# Patient Record
Sex: FEMALE | Race: BLACK OR AFRICAN AMERICAN | Employment: UNEMPLOYED | URBAN - METROPOLITAN AREA
[De-identification: names, ages, dates, MRNs, and addresses within clinical notes are randomized per-mention and may not be internally consistent; named-entity substitution may affect disease eponyms.]

---

## 2017-04-15 ENCOUNTER — HOSPITAL ENCOUNTER (EMERGENCY)
Age: 27
Discharge: HOME | End: 2017-04-15
Attending: EMERGENCY MEDICINE
Payer: SELF-PAY

## 2017-04-15 ENCOUNTER — APPOINTMENT (OUTPATIENT)
Dept: GENERAL RADIOLOGY | Age: 27
End: 2017-04-15
Attending: PHYSICIAN ASSISTANT
Payer: SELF-PAY

## 2017-04-15 VITALS
TEMPERATURE: 97.3 F | OXYGEN SATURATION: 98 % | SYSTOLIC BLOOD PRESSURE: 119 MMHG | DIASTOLIC BLOOD PRESSURE: 54 MMHG | RESPIRATION RATE: 18 BRPM | HEART RATE: 126 BPM

## 2017-04-15 DIAGNOSIS — Z71.6 TOBACCO ABUSE COUNSELING: ICD-10-CM

## 2017-04-15 DIAGNOSIS — J45.901 ASTHMA WITH ACUTE EXACERBATION, UNSPECIFIED ASTHMA SEVERITY: Primary | ICD-10-CM

## 2017-04-15 DIAGNOSIS — F14.90 COCAINE USE: ICD-10-CM

## 2017-04-15 LAB
AMPHET UR QL SCN: NEGATIVE
APPEARANCE UR: ABNORMAL
BACTERIA URNS QL MICRO: ABNORMAL /HPF
BARBITURATES UR QL SCN: NEGATIVE
BENZODIAZ UR QL: NEGATIVE
BILIRUB UR QL: NEGATIVE
CANNABINOIDS UR QL SCN: POSITIVE
COCAINE UR QL SCN: POSITIVE
COLOR UR: ABNORMAL
DRUG SCRN COMMENT,DRGCM: ABNORMAL
EPITH CASTS URNS QL MICRO: ABNORMAL /LPF
GLUCOSE UR STRIP.AUTO-MCNC: NEGATIVE MG/DL
HCG UR QL: NEGATIVE
HGB UR QL STRIP: NEGATIVE
KETONES UR QL STRIP.AUTO: NEGATIVE MG/DL
LEUKOCYTE ESTERASE UR QL STRIP.AUTO: ABNORMAL
METHADONE UR QL: NEGATIVE
NITRITE UR QL STRIP.AUTO: NEGATIVE
OPIATES UR QL: POSITIVE
PCP UR QL: NEGATIVE
PH UR STRIP: 6.5 [PH] (ref 5–8)
PROT UR STRIP-MCNC: NEGATIVE MG/DL
RBC #/AREA URNS HPF: ABNORMAL /HPF (ref 0–5)
SP GR UR REFRACTOMETRY: 1.02 (ref 1–1.03)
UA: UC IF INDICATED,UAUC: ABNORMAL
UROBILINOGEN UR QL STRIP.AUTO: 1 EU/DL (ref 0.2–1)
WBC URNS QL MICRO: ABNORMAL /HPF (ref 0–4)

## 2017-04-15 PROCEDURE — 81001 URINALYSIS AUTO W/SCOPE: CPT | Performed by: PHYSICIAN ASSISTANT

## 2017-04-15 PROCEDURE — 80307 DRUG TEST PRSMV CHEM ANLYZR: CPT | Performed by: PHYSICIAN ASSISTANT

## 2017-04-15 PROCEDURE — 99284 EMERGENCY DEPT VISIT MOD MDM: CPT

## 2017-04-15 PROCEDURE — 94640 AIRWAY INHALATION TREATMENT: CPT

## 2017-04-15 PROCEDURE — 74011250637 HC RX REV CODE- 250/637: Performed by: PHYSICIAN ASSISTANT

## 2017-04-15 PROCEDURE — 87147 CULTURE TYPE IMMUNOLOGIC: CPT | Performed by: PHYSICIAN ASSISTANT

## 2017-04-15 PROCEDURE — 77030013140 HC MSK NEB VYRM -A

## 2017-04-15 PROCEDURE — 93005 ELECTROCARDIOGRAM TRACING: CPT

## 2017-04-15 PROCEDURE — 71020 XR CHEST PA LAT: CPT

## 2017-04-15 PROCEDURE — 81025 URINE PREGNANCY TEST: CPT

## 2017-04-15 PROCEDURE — 87086 URINE CULTURE/COLONY COUNT: CPT | Performed by: PHYSICIAN ASSISTANT

## 2017-04-15 PROCEDURE — 74011000250 HC RX REV CODE- 250: Performed by: PHYSICIAN ASSISTANT

## 2017-04-15 RX ORDER — IPRATROPIUM BROMIDE AND ALBUTEROL SULFATE 2.5; .5 MG/3ML; MG/3ML
3 SOLUTION RESPIRATORY (INHALATION)
Status: COMPLETED | OUTPATIENT
Start: 2017-04-15 | End: 2017-04-15

## 2017-04-15 RX ORDER — ALBUTEROL SULFATE 90 UG/1
2 AEROSOL, METERED RESPIRATORY (INHALATION)
Qty: 1 INHALER | Refills: 0 | Status: SHIPPED | OUTPATIENT
Start: 2017-04-15 | End: 2018-07-26

## 2017-04-15 RX ORDER — METHYLPREDNISOLONE 4 MG/1
TABLET ORAL
Qty: 1 DOSE PACK | Refills: 0 | Status: ON HOLD | OUTPATIENT
Start: 2017-04-15 | End: 2017-10-27

## 2017-04-15 RX ORDER — ALBUTEROL SULFATE 1.25 MG/3ML
2.5 SOLUTION RESPIRATORY (INHALATION)
Qty: 3 ML | Refills: 0 | Status: ON HOLD | OUTPATIENT
Start: 2017-04-15 | End: 2017-10-27

## 2017-04-15 RX ORDER — DIAZEPAM 2 MG/1
2 TABLET ORAL
Status: COMPLETED | OUTPATIENT
Start: 2017-04-15 | End: 2017-04-15

## 2017-04-15 RX ADMIN — IPRATROPIUM BROMIDE AND ALBUTEROL SULFATE 3 ML: .5; 3 SOLUTION RESPIRATORY (INHALATION) at 17:50

## 2017-04-15 RX ADMIN — IPRATROPIUM BROMIDE AND ALBUTEROL SULFATE 3 ML: .5; 3 SOLUTION RESPIRATORY (INHALATION) at 17:17

## 2017-04-15 RX ADMIN — DIAZEPAM 2 MG: 2 TABLET ORAL at 18:14

## 2017-04-15 NOTE — LETTER
[address] 
[facility] EMERGENCY DEPT 
[address] [place] [address] [address] [id number] 
[phone] Work/School Note Date: 4/15/2017 To Whom It May concern: 
 
Cass Anne was seen and treated today in the emergency room by the following provider(s): 
Attending Provider: Fawad Rubin MD 
Physician Assistant: Elio Gonzalez, Rebecca Lopze. Cass Anne may return to work on 4/17/2017. Sincerely, 
 
 
 
 
Rebecca Moore

## 2017-04-15 NOTE — ED PROVIDER NOTES
HPI Comments: Jessa Woo is a 23 y.o. female with PMHx significant for asthma, who presents ambulatory to the ED with cc of sudden onset SOB x 30 minutes. She also c/o a productive cough x \"few weeks\" and intermittent subjective fevers x months. She anxious and crying throughout interview. Pt reports that she was walking in [facility] when she suddenly became SOB. She states that she took \"3 BCs\" PTA and reports that she has not used her inhaler \"in a while\". Pt notes that her most recent asthma flare up was \"months ago\" but is unsure whether her current SOB is c/w that. Of note, pt admits to smoking 1 ppd since she was 17years old. She denies chance of pregnancy, CP, abd pain, N/V/D, hemoptysis, palpitations. PCP: PROVIDER UNKNOWN    Social hx: smoking (1 ppd) EtOH (+)    There are no other complaints, changes, or physical findings at this time. The history is provided by the patient. Past Medical History:   Diagnosis Date    ADHD (attention deficit hyperactivity disorder)     Asthma        History reviewed. No pertinent surgical history. Family History:   Problem Relation Age of Onset    Asthma Mother     Hypertension Mother     Asthma Sister     Hypertension Sister     Asthma Brother     Hypertension Brother        Social History     Social History    Marital status: SINGLE     Spouse name: N/A    Number of children: N/A    Years of education: N/A     Occupational History    Not on file. Social History Main Topics    Smoking status: Current Every Day Smoker     Packs/day: 1.00    Smokeless tobacco: Never Used    Alcohol use Yes    Drug use: Yes     Special: Marijuana    Sexual activity: Yes     Partners: Female     Other Topics Concern    Not on file     Social History Narrative    ** Merged History Encounter **         ** Merged History Encounter **              ALLERGIES: Review of patient's allergies indicates no known allergies.     Review of Systems Constitutional: Positive for fever. Negative for activity change, appetite change, chills, diaphoresis and unexpected weight change. HENT: Negative for congestion, hearing loss, rhinorrhea, sinus pressure, sneezing, sore throat and trouble swallowing. Eyes: Negative for pain, redness, itching and visual disturbance. Respiratory: Positive for cough and shortness of breath. Negative for wheezing. Cardiovascular: Negative for chest pain, palpitations and leg swelling. Gastrointestinal: Negative for abdominal pain, constipation, diarrhea, nausea and vomiting. Genitourinary: Negative for dysuria. Musculoskeletal: Negative for arthralgias, gait problem and myalgias. Skin: Negative for color change, pallor, rash and wound. Neurological: Negative for tremors, weakness, light-headedness, numbness and headaches. Psychiatric/Behavioral: The patient is nervous/anxious. All other systems reviewed and are negative. Vitals:    04/15/17 1655 04/15/17 1750 04/15/17 1800 04/15/17 1900   BP: (!) 147/98 117/59 119/54    Pulse: 69 94 88 (!) 126   Resp: 18      Temp: 97.3 °F (36.3 °C)      SpO2: 100% 100% 100% 98%            Physical Exam   Constitutional: She is oriented to person, place, and time. She appears well-developed and well-nourished. No distress. Tearful, anxious appearing. HENT:   Head: Normocephalic and atraumatic. Right Ear: External ear normal.   Left Ear: External ear normal.   Mouth/Throat: Oropharynx is clear and moist. No oropharyngeal exudate. Eyes: Conjunctivae are normal. Pupils are equal, round, and reactive to light. Right eye exhibits no discharge. Left eye exhibits no discharge. No scleral icterus. Appear dilated   Neck: Normal range of motion. Neck supple. No tracheal deviation present. Cardiovascular: Normal rate, regular rhythm, normal heart sounds and intact distal pulses. Exam reveals no gallop and no friction rub. No murmur heard.   Pulmonary/Chest: No stridor. No respiratory distress. She has wheezes (lung apices). She has no rales. She exhibits no tenderness. Comfortable at rest without tachypnea, accessory muscle use, or obvious chest wall abnormalities. Communicates in full sentences  Diffuse expiratory wheezing  Pt maintaining airway   Abdominal: Soft. Bowel sounds are normal. She exhibits no distension. There is no tenderness. Musculoskeletal: Normal range of motion. She exhibits no edema, tenderness or deformity. Lymphadenopathy:     She has no cervical adenopathy. Neurological: She is alert and oriented to person, place, and time. No cranial nerve deficit. Coordination normal.   Skin: Skin is warm and dry. No rash noted. She is not diaphoretic. No erythema. No pallor. Psychiatric:   Pt responds appropriately to questions but appears paranoid and anxious during interview     Nursing note and vitals reviewed. MDM  Number of Diagnoses or Management Options  Asthma with acute exacerbation, unspecified asthma severity:   Cocaine use: Tobacco abuse counseling:   Diagnosis management comments: DDx: asthma,exacerbation,PNA, brochitis, illicit drug use, anxiety       Amount and/or Complexity of Data Reviewed  Clinical lab tests: reviewed and ordered  Tests in the radiology section of CPT®: reviewed and ordered  Tests in the medicine section of CPT®: reviewed and ordered  Review and summarize past medical records: yes  Independent visualization of images, tracings, or specimens: yes    Patient Progress  Patient progress: stable    ED Course       Procedures  I reviewed our electronic medical record system for any past medical records that were available that may contribute to the patients current condition, the nursing notes and and vital signs from today's visit     Nursing notes will be reviewed as they become available in realtime while the pt is in the ED.     Progress Note:  4: 48 PM  The patients presenting problems have been discussed, and they are in agreement with the care plan formulated and outlined with them. I have encouraged them to ask questions as they arise throughout their visit. Progress Note:  5:30 PM  Pt states \"Oh I feel better now. \" Pt's UDS positive for cocaine and opioids. She states that she took \"Bianca\" yesterday at 0200 but was unaware that it had cocaine in it, stating \" I don't do cocaine. \" Informed pt that the person that she bought her MDMA from likely cut her MDMA with cocaine. Pt denies THC use. Awaiting XR results. Written by Maria Alejandra Lin, ED Scribe as dictated by [employer], RANJEET    6:55 PM  Provider re-evaluated pt. Per patient, SOB has improved. Provider discussed all available diagnostics, diagnosis, and treatment plan. Thoroughly discussed worrisome signs/symptoms in which pt should immediately return to ED, otherwise, urged to follow-up with PCP. Patient conveys understanding and agreement to all of the above. All patient's questions were answered by provider. DISCHARGE NOTE:  6:58 PM  Yaz Galarza's  results have been reviewed with her. She has been counseled regarding her diagnosis. She verbally conveys understanding and agreement of the signs, symptoms, diagnosis, treatment and prognosis and additionally agrees to follow up as recommended with Dr. Calli Westbrook in 24 - 48 hours. She also agrees with the care-plan and conveys that all of her questions have been answered. I have also put together some discharge instructions for her that include: 1) educational information regarding their diagnosis, 2) how to care for their diagnosis at home, as well a 3) list of reasons why they would want to return to the ED prior to their follow-up appointment, should their condition change. She and/or family's questions have been answered. I have encouraged them to see the official results in [facility]\" or to retrieve the specifics of their results from medical records.      LABS COMPLETED AND REVIEWED:  Recent Results (from the past 12 hour(s))   EKG, 12 LEAD, INITIAL    Collection Time: 04/15/17  5:00 PM   Result Value Ref Range    Ventricular Rate 70 BPM    Atrial Rate 70 BPM    P-R Interval 126 ms    QRS Duration 88 ms    Q-T Interval 388 ms    QTC Calculation (Bezet) 419 ms    Calculated P Axis 2 degrees    Calculated R Axis 79 degrees    Calculated T Axis 52 degrees    Diagnosis       Normal sinus rhythm  Normal ECG  When compared with ECG of 26-AUG-2016 21:10,  No significant change was found     URINALYSIS W/ REFLEX CULTURE    Collection Time: 04/15/17  5:23 PM   Result Value Ref Range    Color YELLOW/STRAW      Appearance CLOUDY (A) CLEAR      Specific gravity 1.018 1.003 - 1.030      pH (UA) 6.5 5.0 - 8.0      Protein NEGATIVE  NEG mg/dL    Glucose NEGATIVE  NEG mg/dL    Ketone NEGATIVE  NEG mg/dL    Bilirubin NEGATIVE  NEG      Blood NEGATIVE  NEG      Urobilinogen 1.0 0.2 - 1.0 EU/dL    Nitrites NEGATIVE  NEG      Leukocyte Esterase SMALL (A) NEG      WBC 0-4 0 - 4 /hpf    RBC 0-5 0 - 5 /hpf    Epithelial cells MODERATE (A) FEW /lpf    Bacteria 1+ (A) NEG /hpf    UA:UC IF INDICATED URINE CULTURE ORDERED (A) CNI     DRUG SCREEN, URINE    Collection Time: 04/15/17  5:23 PM   Result Value Ref Range    AMPHETAMINE NEGATIVE  NEG      BARBITURATES NEGATIVE  NEG      BENZODIAZEPINE NEGATIVE  NEG      COCAINE POSITIVE (A) NEG      METHADONE NEGATIVE  NEG      OPIATES POSITIVE (A) NEG      PCP(PHENCYCLIDINE) NEGATIVE  NEG      THC (TH-CANNABINOL) POSITIVE (A) NEG      Drug screen comment (NOTE)    HCG URINE, QL. - POC    Collection Time: 04/15/17  5:25 PM   Result Value Ref Range    Pregnancy test,urine (POC) NEGATIVE  NEG         IMAGING COMPLETED AND REVIEWED:  EXAM: XR CHEST PA LAT     INDICATION: SOB, Hx of asthma     COMPARISON: 2016.     FINDINGS: A single view of the chest demonstrates clear lungs.  The cardiac and  mediastinal contours and pulmonary vascularity are normal. The bones and soft  tissues are within normal limits.      IMPRESSION  IMPRESSION: No acute abnormality identified.       CLINICAL IMPRESSION:  1. Asthma with acute exacerbation, unspecified asthma severity    2. Cocaine use    3. Tobacco abuse counseling        Plan:  1. Return precautions  2. Medications as prescribed  3. Follow-ups as discussed  4. Discontinue illicit drug use    Discharge Medication List as of 4/15/2017  6:59 PM      START taking these medications    Details   albuterol (PROAIR HFA) 90 mcg/actuation inhaler Take 2 Puffs by inhalation every six (6) hours as needed for Wheezing., Print, Disp-1 Inhaler, R-0      albuterol (ACCUNEB) 1.25 mg/3 mL nebu 6 mL by Nebulization route every six (6) hours as needed. , Print, Disp-3 mL, R-0      methylPREDNISolone (MEDROL, SVETA,) 4 mg tablet Per dose pack instructions, Print, Disp-1 Dose Pack, R-0           Follow-up Information     Follow up With Details Comments Contact Info    Provider Unknown Schedule an appointment as soon as possible for a visit in 2 days As needed, If symptoms worsen, Possible further evaluation and treatment Patient not available to ask      [facility] EMERGENCY DEPT Go to As needed, If symptoms worsen [address] [facility] [address]  [phone]    Harshal Corcoran MD Schedule an appointment as soon as possible for a visit in 2 days As needed, If symptoms worsen, Possible further evaluation and treatment [address] [facility]  Francis Conklin [address] [place] [address]  [phone]          Return to the closest emergency room or follow up sooner for any deterioration        This note will not be viewable in [employer]. This note is prepared by Felipe Vincent, acting as Scribe for Jonathan Corbin PA-C. Jonathan Corbin PA-C: The scribe's documentation has been prepared under my direction and personally reviewed by me in its entirety.  I confirm that the note above accurately reflects all work, treatment, procedures, and medical decision making performed by me.

## 2017-04-15 NOTE — ED NOTES
Discharge instructions provided to patient. Patient verbalizes understanding. Patient ambulatory out of ED with transportation home.

## 2017-04-15 NOTE — ED NOTES
Assumed care of patient from triage. Patient tearful and shivering, asking \"Am I going to be all right? \" Patient claims she was at [facility] and suddenly developed a headache and shortness of breath. Vital signs stable. Monitor x3.

## 2017-04-15 NOTE — DISCHARGE INSTRUCTIONS
Asthma Attack: Care Instructions  Your Care Instructions    During an asthma attack, the airways swell and narrow. This makes it hard to breathe. Severe asthma attacks can be life-threatening, but you can help prevent them by keeping your asthma under control and treating symptoms before they get bad. Symptoms include being short of breath, having chest tightness, coughing, and wheezing. Noting and treating these symptoms can also help you avoid future trips to the emergency room. The doctor has checked you carefully, but problems can develop later. If you notice any problems or new symptoms, get medical treatment right away. Follow-up care is a key part of your treatment and safety. Be sure to make and go to all appointments, and call your doctor if you are having problems. It's also a good idea to know your test results and keep a list of the medicines you take. How can you care for yourself at home? · Follow your asthma action plan to prevent and treat attacks. If you don't have an asthma action plan, work with your doctor to create one. · Take your asthma medicines exactly as prescribed. Talk to your doctor right away if you have any questions about how to take them. ¨ Use your quick-relief medicine when you have symptoms of an attack. Quick-relief medicine is usually an albuterol inhaler. Some people need to use quick-relief medicine before they exercise. ¨ Take your controller medicine every day, not just when you have symptoms. Controller medicine is usually an inhaled corticosteroid. The goal is to prevent problems before they occur. Don't use your controller medicine to treat an attack that has already started. It doesn't work fast enough to help. ¨ If your doctor prescribed corticosteroid pills to use during an attack, take them exactly as prescribed. It may take hours for the pills to work, but they may make the episode shorter and help you breathe better.   ¨ Keep your quick-relief medicine with you at all times. · Talk to your doctor before using other medicines. Some medicines, such as aspirin, can cause asthma attacks in some people. · If you have a peak flow meter, use it to check how well you are breathing. This can help you predict when an asthma attack is going to occur. Then you can take medicine to prevent the asthma attack or make it less severe. · Do not smoke or allow others to smoke around you. Avoid smoky places. Smoking makes asthma worse. If you need help quitting, talk to your doctor about stop-smoking programs and medicines. These can increase your chances of quitting for good. · Learn what triggers an asthma attack for you, and avoid the triggers when you can. Common triggers include colds, smoke, air pollution, dust, pollen, mold, pets, cockroaches, stress, and cold air. · Avoid colds and the flu. Get a pneumococcal vaccine shot. If you have had one before, ask your doctor if you need a second dose. Get a flu vaccine every fall. If you must be around people with colds or the flu, wash your hands often. When should you call for help? Call 911 anytime you think you may need emergency care. For example, call if:  · You have severe trouble breathing. Call your doctor now or seek immediate medical care if:  · Your symptoms do not get better after you have followed your asthma action plan. · You have new or worse trouble breathing. · Your coughing and wheezing get worse. · You cough up dark brown or bloody mucus (sputum). · You have a new or higher fever. Watch closely for changes in your health, and be sure to contact your doctor if:  · You need to use quick-relief medicine on more than 2 days a week (unless it is just for exercise). · You cough more deeply or more often, especially if you notice more mucus or a change in the color of your mucus. · You are not getting better as expected. Where can you learn more?   Go to http://eddie-leonardo.info/. Enter R995 in the search box to learn more about \"Asthma Attack: Care Instructions. \"  Current as of: May 23, 2016  Content Version: 11.2  © 7631-9422 [employer]. Care instructions adapted under license by [employer] (which disclaims liability or warranty for this information). If you have questions about a medical condition or this instruction, always ask your healthcare professional. [place] [address] any warranty or liability for your use of this information. Learning About Asthma Triggers  What are triggers? When you have asthma, certain things can make your symptoms worse. These are called triggers. They include:  · Cigarette smoke or air pollution. · Things you are allergic to, such as:  ¨ Pollen, mold, or dust mites. ¨ Pet hair, skin, or saliva. · Illnesses, like colds, flu, or pneumonia. · Exercise. · Dry, cold air. How do triggers affect asthma? Triggers can make it harder for your lungs to work as they should and can lead to sudden difficulty breathing and other symptoms. When you are around a trigger, an asthma attack is more likely. If your symptoms are severe, you may need emergency treatment or have to go to the hospital for treatment. If you know what your triggers are and can avoid them, you may be able to prevent asthma attacks, reduce how often you have them, and make them less severe. What can you do to avoid triggers? The first thing is to know your triggers. When you are having symptoms, note the things around you that might be causing them. Then look for patterns in what may be triggering your symptoms. Record your triggers on a piece of paper or in an asthma diary. When you have your list of possible triggers, work with your doctor to find ways to avoid them. You also can check how well your lungs are working by measuring your peak expiratory flow (PEF) throughout the day.  Your PEF may drop when you are near things that trigger symptoms. Here are some ways to avoid a few common triggers. · Do not smoke or allow others to smoke around you. If you need help quitting, talk to your doctor about stop-smoking programs and medicines. These can increase your chances of quitting for good. · If there is a lot of pollution, pollen, or dust outside, stay at home and keep your windows closed. Use an air conditioner or air filter in your home. Check your local weather report or newspaper for air quality and pollen reports. · Get a flu shot every year. Talk to your doctor about getting a pneumococcal shot. Wash your hands often to prevent infections. · Avoid exercising outdoors in cold weather. If you are outdoors in cold weather, wear a scarf around your face and breathe through your nose. How can you manage an asthma attack? · If you have an asthma action plan, follow the plan. In general:  ¨ Use your quick-relief inhaler as directed by your doctor. If your symptoms do not get better after you use your medicine, have someone take you to the emergency room. Call an ambulance if needed. ¨ If your doctor has given you other inhaled medicines or steroid pills, take them as directed. Where can you learn more? Go to [employer].be  Enter M564 in the search box to learn more about \"Learning About Asthma Triggers. \"   © 3195-3757 Healthwise, Incorporated. Care instructions adapted under license by [employer] (which disclaims liability or warranty for this information). This care instruction is for use with your licensed healthcare professional. If you have questions about a medical condition or this instruction, always ask your healthcare professional. [place] [address] any warranty or liability for your use of this information. Content Version: 55.1.111721;  Last Revised: February 23, 2012                Asthma Action Plan: After Your Visit  Your Care Instructions  An asthma action plan is based on peak flow and asthma symptoms. Sorting symptoms and peak flow into red, yellow, and green \"zones\" can help you know how bad your asthma is and what actions you should take. Work with your doctor to make your plan. An action plan may include:  · The peak flow readings and symptoms for each zone. · What medicines to take in each zone. · When to call a doctor. · A list of emergency contact numbers. · A list of your asthma triggers. Follow-up care is a key part of your treatment and safety. Be sure to make and go to all appointments, and call your doctor if you are having problems. It's also a good idea to know your test results and keep a list of the medicines you take. How can you care for yourself at home? · Take your daily medicines to help minimize long-term damage and avoid asthma attacks. · Check your peak flow every morning and evening. This is the best way to know how well your lungs are working. · Check your action plan to see what zone you are in.  ¨ If you are in the green zone, keep taking your daily asthma medicines as prescribed. ¨ If you are in the yellow zone, you may be having or will soon have an asthma attack. You may not have any symptoms, but your lungs are not working as well as they should. Take the medicines listed in your action plan. If you stay in the yellow zone, your doctor may need to increase the dose or add a medicine. ¨ If you are in the red zone, follow your action plan. If your symptoms or peak flow don't improve soon, you may need to go to the emergency room or be admitted to the hospital.  · Use an asthma diary. Write down your peak flow readings in the asthma diary. If you have an attack, write down what caused it (if you know), the symptoms, and what medicine you took.   · Make sure you know how and when to call your doctor or go to the hospital.  · Take both the asthma action plan and the asthma diary--along with your peak flow meter and medicines--when you see your doctor. Tell your doctor if you are having trouble following your action plan. When should you call for help? Call 911 anytime you think you may need emergency care. For example, call if:  · You have severe trouble breathing. Call your doctor now or seek immediate medical care if:  · Your symptoms do not get better after you have followed your asthma action plan. · You cough up yellow, dark brown, or bloody mucus (sputum). Watch closely for changes in your health, and be sure to contact your doctor if:  · Your coughing and wheezing get worse. · You need to use quick-relief medicine on more than 2 days a week (unless it is just for exercise). · You need help figuring out what is triggering your asthma attacks. Where can you learn more? Go to Joosy.be  Enter B511 in the search box to learn more about \"Asthma Action Plan: After Your Visit. \"   © 1314-5457 Healthwise, Incorporated. Care instructions adapted under license by [employer] (which disclaims liability or warranty for this information). This care instruction is for use with your licensed healthcare professional. If you have questions about a medical condition or this instruction, always ask your healthcare professional. [address] any warranty or liability for your use of this information. Content Version: 05.1.328271;  Last Revised: March 9, 2012

## 2017-04-15 NOTE — ED NOTES
While requesting urine sample from patient, patient states \"Oh, I feel better now. \" Patient able to urinate and nebulizer treatment started. Patient still appears slightly anxious, but anxiety seems to have improved. Will continue to monitor.

## 2017-04-16 LAB
ATRIAL RATE: 70 BPM
CALCULATED P AXIS, ECG09: 2 DEGREES
CALCULATED R AXIS, ECG10: 79 DEGREES
CALCULATED T AXIS, ECG11: 52 DEGREES
DIAGNOSIS, 93000: NORMAL
P-R INTERVAL, ECG05: 126 MS
Q-T INTERVAL, ECG07: 388 MS
QRS DURATION, ECG06: 88 MS
QTC CALCULATION (BEZET), ECG08: 419 MS
VENTRICULAR RATE, ECG03: 70 BPM

## 2017-04-17 LAB
BACTERIA SPEC CULT: ABNORMAL
BACTERIA SPEC CULT: ABNORMAL
CC UR VC: ABNORMAL
SERVICE CMNT-IMP: ABNORMAL

## 2017-09-26 ENCOUNTER — HOSPITAL ENCOUNTER (EMERGENCY)
Age: 27
Discharge: HOME | End: 2017-09-26
Attending: EMERGENCY MEDICINE | Admitting: EMERGENCY MEDICINE
Payer: SELF-PAY

## 2017-09-26 VITALS
OXYGEN SATURATION: 98 % | SYSTOLIC BLOOD PRESSURE: 109 MMHG | RESPIRATION RATE: 20 BRPM | TEMPERATURE: 98.2 F | WEIGHT: 121 LBS | BODY MASS INDEX: 19.44 KG/M2 | DIASTOLIC BLOOD PRESSURE: 66 MMHG | HEIGHT: 66 IN | HEART RATE: 96 BPM

## 2017-09-26 DIAGNOSIS — F11.93 NARCOTIC WITHDRAWAL (HCC): Primary | ICD-10-CM

## 2017-09-26 LAB
ALBUMIN SERPL-MCNC: 3.6 G/DL (ref 3.5–5)
ALBUMIN/GLOB SERPL: 0.9 {RATIO} (ref 1.1–2.2)
ALP SERPL-CCNC: 51 U/L (ref 45–117)
ALT SERPL-CCNC: 22 U/L (ref 12–78)
ANION GAP SERPL CALC-SCNC: 6 MMOL/L (ref 5–15)
AST SERPL-CCNC: 19 U/L (ref 15–37)
BASOPHILS # BLD: 0 K/UL (ref 0–0.1)
BASOPHILS NFR BLD: 0 % (ref 0–1)
BILIRUB SERPL-MCNC: 0.4 MG/DL (ref 0.2–1)
BUN SERPL-MCNC: 9 MG/DL (ref 6–20)
BUN/CREAT SERPL: 8 (ref 12–20)
CALCIUM SERPL-MCNC: 8.8 MG/DL (ref 8.5–10.1)
CHLORIDE SERPL-SCNC: 108 MMOL/L (ref 97–108)
CO2 SERPL-SCNC: 25 MMOL/L (ref 21–32)
CREAT SERPL-MCNC: 1.17 MG/DL (ref 0.55–1.02)
EOSINOPHIL # BLD: 0 K/UL (ref 0–0.4)
EOSINOPHIL NFR BLD: 0 % (ref 0–7)
ERYTHROCYTE [DISTWIDTH] IN BLOOD BY AUTOMATED COUNT: 13.8 % (ref 11.5–14.5)
GLOBULIN SER CALC-MCNC: 4.1 G/DL (ref 2–4)
GLUCOSE SERPL-MCNC: 110 MG/DL (ref 65–100)
HCT VFR BLD AUTO: 41.5 % (ref 35–47)
HGB BLD-MCNC: 13.8 G/DL (ref 11.5–16)
LYMPHOCYTES # BLD: 3.2 K/UL (ref 0.8–3.5)
LYMPHOCYTES NFR BLD: 30 % (ref 12–49)
MCH RBC QN AUTO: 28.9 PG (ref 26–34)
MCHC RBC AUTO-ENTMCNC: 33.3 G/DL (ref 30–36.5)
MCV RBC AUTO: 87 FL (ref 80–99)
MONOCYTES # BLD: 0.4 K/UL (ref 0–1)
MONOCYTES NFR BLD: 4 % (ref 5–13)
NEUTS SEG # BLD: 7.2 K/UL (ref 1.8–8)
NEUTS SEG NFR BLD: 66 % (ref 32–75)
PLATELET # BLD AUTO: 375 K/UL (ref 150–400)
POTASSIUM SERPL-SCNC: 4 MMOL/L (ref 3.5–5.1)
PROT SERPL-MCNC: 7.7 G/DL (ref 6.4–8.2)
RBC # BLD AUTO: 4.77 M/UL (ref 3.8–5.2)
SODIUM SERPL-SCNC: 139 MMOL/L (ref 136–145)
WBC # BLD AUTO: 10.9 K/UL (ref 3.6–11)

## 2017-09-26 PROCEDURE — 90791 PSYCH DIAGNOSTIC EVALUATION: CPT

## 2017-09-26 PROCEDURE — 80053 COMPREHEN METABOLIC PANEL: CPT | Performed by: EMERGENCY MEDICINE

## 2017-09-26 PROCEDURE — 99283 EMERGENCY DEPT VISIT LOW MDM: CPT

## 2017-09-26 PROCEDURE — 74011250636 HC RX REV CODE- 250/636: Performed by: EMERGENCY MEDICINE

## 2017-09-26 PROCEDURE — 85025 COMPLETE CBC W/AUTO DIFF WBC: CPT | Performed by: EMERGENCY MEDICINE

## 2017-09-26 PROCEDURE — 96374 THER/PROPH/DIAG INJ IV PUSH: CPT

## 2017-09-26 PROCEDURE — 36415 COLL VENOUS BLD VENIPUNCTURE: CPT | Performed by: EMERGENCY MEDICINE

## 2017-09-26 PROCEDURE — 96361 HYDRATE IV INFUSION ADD-ON: CPT

## 2017-09-26 RX ORDER — KETOROLAC TROMETHAMINE 30 MG/ML
30 INJECTION, SOLUTION INTRAMUSCULAR; INTRAVENOUS
Status: COMPLETED | OUTPATIENT
Start: 2017-09-26 | End: 2017-09-26

## 2017-09-26 RX ORDER — KETOROLAC TROMETHAMINE 10 MG/1
10 TABLET, FILM COATED ORAL
Qty: 12 TAB | Refills: 0 | Status: ON HOLD | OUTPATIENT
Start: 2017-09-26 | End: 2017-10-27

## 2017-09-26 RX ADMIN — SODIUM CHLORIDE 1000 ML: 900 INJECTION, SOLUTION INTRAVENOUS at 19:08

## 2017-09-26 RX ADMIN — KETOROLAC TROMETHAMINE 30 MG: 30 INJECTION, SOLUTION INTRAMUSCULAR at 19:08

## 2017-09-26 NOTE — ED PROVIDER NOTES
HPI Comments: 23 y.o. female with past medical history significant for ADHD and asthma who presents from home with chief complaint of drug addiction. Patient admits she has been using heroin daily over the past month with the last usage yesterday morning. Patient comes in today with complaints of moderate generalized body aches suspected from withdrawal sx. Patient also complains of constipation. Patient mentions she sometimes has suicidal ideation with plans to overdose, which she has attempted in the past. Patient also states she has \"hallucinations\" of a spirit in her apartment. Patient denies any other hx of drug use besides occasional marijuana. Patient denies any other sx including nausea, vomiting light-headedness, and dizziness. There are no other acute medical concerns at this time. Old Chart Review: Per note, patient was evaluated on 04/15/17 for asthma with acute exacerbation. Social hx: Tobacco Use: Yes (1 pack per day), Alcohol Use: Yes    Note written by Gerald Wright, as dictated by Papito Mcclellan MD 6:42 PM      The history is provided by the patient. Past Medical History:   Diagnosis Date    ADHD (attention deficit hyperactivity disorder)     Asthma        History reviewed. No pertinent surgical history. Family History:   Problem Relation Age of Onset    Asthma Mother     Hypertension Mother     Asthma Sister     Hypertension Sister     Asthma Brother     Hypertension Brother        Social History     Social History    Marital status: SINGLE     Spouse name: N/A    Number of children: N/A    Years of education: N/A     Occupational History    Not on file.      Social History Main Topics    Smoking status: Current Every Day Smoker     Packs/day: 1.00    Smokeless tobacco: Never Used    Alcohol use Yes    Drug use: Yes     Special: Marijuana, Other, Heroin      Comment: Bianca    Sexual activity: Yes     Partners: Female     Other Topics Concern    Not on file     Social History Narrative    ** Merged History Encounter **         ** Merged History Encounter **              ALLERGIES: Review of patient's allergies indicates no known allergies. Review of Systems   Constitutional: Negative for chills and fever. HENT: Negative for rhinorrhea and sore throat. Respiratory: Negative for cough and shortness of breath. Cardiovascular: Negative for chest pain. Gastrointestinal: Positive for constipation. Negative for abdominal pain, diarrhea, nausea and vomiting. Genitourinary: Negative for dysuria and urgency. Musculoskeletal: Positive for myalgias. Negative for arthralgias and back pain. Skin: Negative for rash. Neurological: Negative for dizziness, weakness and light-headedness. Psychiatric/Behavioral: Positive for hallucinations. All other systems reviewed and are negative. Vitals:    09/26/17 1645   BP: 109/66   Pulse: 96   Resp: 20   Temp: 98.2 °F (36.8 °C)   SpO2: 98%   Weight: 54.9 kg (121 lb)   Height: 5' 6\" (1.676 m)            Physical Exam     Vital signs reviewed. Nursing notes reviewed.     Const:  No acute distress, well developed, well nourished  Head:  Atraumatic, normocephalic  Eyes:  PERRL, conjunctiva normal, no scleral icterus  Neck:  Supple, trachea midline  Cardiovascular:  RRR, no murmurs, no gallops, no rubs  Resp:  No resp distress, no increased work of breathing, no wheezes, no rhonchi, no rales,  Abd:  Soft, non-tender, non-distended, no rebound, no guarding, no CVA tenderness  :  Deferred  MSK:  No pedal edema, normal ROM  Neuro:  Alert and oriented x3, no cranial nerve defect  Skin:  Warm, dry, intact  Psych: normal mood and affect, behavior is normal, judgement and thought content is normal    Note written by Gerald Adkins, as dictated by Violetta Dubin, MD 6:42 PM    MDM  Number of Diagnoses or Management Options  Narcotic withdrawal [facility]):      Amount and/or Complexity of Data Reviewed  Clinical lab tests: ordered and reviewed  Tests in the radiology section of CPT®: ordered and reviewed  Review and summarize past medical records: yes    Patient Progress  Patient progress: stable    ED Course     Pt. Presents to the ER with body aches. Pt is experiencing heroin withdrawal. She is well appearing in the ER. Pt. Seen by [facility] and is cleared for discharged. She was given resources for f/u for heroin use. Pt. To return to the ER with worsening sx.       Procedures

## 2017-09-26 NOTE — BSMART NOTE
Comprehensive Assessment Form Part 1      Section I - Disposition    Axis I - Opiate Dependence   Axis II - Deferred  Axis III -   Past Medical History:   Diagnosis Date    ADHD (attention deficit hyperactivity disorder)     Asthma        Axis IV - SA, homeless  [place] V - 36      The Medical Doctor to Psychiatrist conference was not completed. The Medical Doctor is in agreement with Psychiatrist disposition because of (reason) Patient does not require inpatient psychiatric treatment. The plan is discharge. Refer to [facility], [facility], NA  The on-call Psychiatrist consulted was Dr. Darryle Pam. The admitting Psychiatrist will be Dr. James Méndez. The admitting Diagnosis is NA. The Payor source is self pay. Section II - Integrated Summary  Summary:  Patient came in requesting heroin detox. Patient advised there is no heroin detox at this facility and she verbalized SI. Patient denied a plan at this time and stated she is suicidal \"because of drugs. \"  Patient denied HI. Patient denied any current treatment but reported in the past she has been diagnosed ADHD and \"other imperative\" and treated. Patient denied SA treatment. Patient reported use of heroin in the last month and denied other substances. Patient is alert and oriented. She is irritable and stated Serjio Lanier is your assessment? \"  \"you think I'm an angry person. \"  Patient reported she lives on the streets and \"my women\"  Take care of her. Patient requesting a place to be sent tonight because she needs help and is tired of this. Patient does not appear to be imminently suicidal   The patienthas demonstrated mental capacity to provide informed consent. The information is given by the patient and parent. The Chief Complaint is heroin dependence. The Precipitant Factors are homeless, SA. Previous Hospitalizations: NA  The patient has not previously been in restraints. Current Psychiatrist and/or  is NA.     Lethality Assessment:    The potential for suicide noted by the following: ideation and current substance abuse . The potential for homicide is not noted. The patient has not been a perpetrator of sexual or physical abuse. There are not pending charges. The patient is not felt to be at risk for self harm or harm to others. The attending nurse was advised that security has not been notified. Section III - Psychosocial  The patient's overall mood and attitude is irritable. Feelings of helplessness and hopelessness are not observed. Generalized anxiety is not observed. Panic is not observed. Phobias are not observed. Obsessive compulsive tendencies are not observed. Section IV - Mental Status Exam  The patient's appearance shows no evidence of impairment. The patient's behavior shows no evidence of impairment. The patient is oriented to time, place, person and situation. The patient's speech shows no evidence of impairment. The patient's mood is irritable. The range of affect is constricted. The patient's thought content demonstrates no evidence of impairment. The thought process shows no evidence of impairment. The patient's perception shows no evidence of impairment. The patient's memory shows no evidence of impairment. The patient's appetite is decreased and shows signs of weight loss. The patient's sleep has evidence of insomnia. The patient shows no insight. The patient's judgement is psychologically impaired. Section V - Substance Abuse  The patient is using substances. The patient is using heroin by inhalation for a month with last use on yesterday. The patient has experienced the following withdrawal symptoms: body aches and abdominal pain. Section VI - Living Arrangements  The patient is single. The patient lives alone. The patient has no children. The patient does not plan to return home upon discharge. The patient does not have legal issues pending.  The patient's source of income comes from unemployment . Sikhism and cultural practices have not been voiced at this time. The patient's greatest support comes from \"my women\" and this person will not be involved with the treatment. The patient has not been in an event described as horrible or outside the realm of ordinary life experience either currently or in the past.  The patient has not been a victim of sexual/physical abuse. Section VII - Other Areas of Clinical Concern  The highest grade achieved is NA with the overall quality of school experience being described as NA. The patient is currently unemployed and speaks [place] as a primary language. The patient has no communication impairments affecting communication. The patient's preference for learning can be described as: can read and write adequately.   The patient's hearing is normal.  The patient's vision is normal.      Arminda Snellen, LPC

## 2017-09-26 NOTE — ED TRIAGE NOTES
Pt arrives from home with her mother requesting drug detox for heroin. Pt instructed that we did not have a detox program.  Pt states, \"I'm suicidal from the pain. \"

## 2017-09-27 NOTE — ED NOTES
Pt requesting IV be removed so she can leave; Notified that MD is aware of her leg pain; States 'just take this out so I can go home.  I already got my papers from the counselor'; IV removed

## 2017-09-27 NOTE — DISCHARGE INSTRUCTIONS
Opioid Withdrawal: Care Instructions  Your Care Instructions  Opioids are strong pain medicines. Examples include hydrocodone, oxycodone, fentanyl, and morphine. Heroin is an example of an illegal opioid. Your body gets used to this type of drug if you take it all the time. This is called being dependent on the drug. And when you stop taking it, you go through withdrawal.  Withdrawal symptoms can include nausea, sweating, chills, diarrhea, stomach cramps, and muscle aches. Withdrawal can last up to several weeks, depending on which drug you took. You may feel very ill, but you are probably not in medical danger. Withdrawal isn't easy, but there are things you can do to help you cope with the symptoms. You will feel a little bit better each day as your body adjusts and heals itself. Follow-up care is a key part of your treatment and safety. Be sure to make and go to all appointments, and call your doctor if you are having problems. It's also a good idea to know your test results and keep a list of the medicines you take. How can you care for yourself at home? · Your doctor may give you medicine to help you feel better. Read and follow all instructions on the label. · To help get through withdrawal, you can also:  ¨ Get plenty of rest.  ¨ Drink plenty of fluids. ¨ Stay active, but don't tire yourself. ¨ Eat a healthy diet. · Do not drink alcohol or take illegal drugs. · Talk to your doctor about drug treatment programs to help you stay drug-free. · Talk to your doctor or pharmacist about having a naloxone rescue kit on hand. When should you call for help? Call 911 anytime you think you may need emergency care. For example, call if:  · You feel you cannot stop from hurting yourself or someone else. Call your doctor now or seek immediate medical care if:  · You have new or worse withdrawal symptoms that you can't manage at home, such as:  ¨ Stomach cramps. ¨ Vomiting. ¨ Diarrhea.   ¨ Muscle aches.  ¨ Sweating. Watch closely for changes in your health, and be sure to contact your doctor if:  · You do not get better as expected. Where can you learn more? Go to http://eddie-leonardo.info/. Enter E242 in the search box to learn more about \"Opioid Withdrawal: Care Instructions. \"  Current as of: February 21, 2017  Content Version: 11.3  © 6298-8606 [employer]. Care instructions adapted under license by [employer] (which disclaims liability or warranty for this information). If you have questions about a medical condition or this instruction, always ask your healthcare professional. [address] any warranty or liability for your use of this information.

## 2017-10-26 ENCOUNTER — HOSPITAL ENCOUNTER (INPATIENT)
Age: 27
LOS: 4 days | Discharge: HOME | DRG: 897 | End: 2017-10-30
Attending: EMERGENCY MEDICINE | Admitting: PSYCHIATRY & NEUROLOGY
Payer: SELF-PAY

## 2017-10-26 DIAGNOSIS — F32.A DEPRESSION, UNSPECIFIED DEPRESSION TYPE: Primary | ICD-10-CM

## 2017-10-26 PROBLEM — F19.94 SUBSTANCE INDUCED MOOD DISORDER (HCC): Status: ACTIVE | Noted: 2017-10-26

## 2017-10-26 LAB
AMPHET UR QL SCN: NEGATIVE
ANION GAP SERPL CALC-SCNC: 9 MMOL/L (ref 5–15)
APPEARANCE UR: ABNORMAL
BACTERIA URNS QL MICRO: ABNORMAL /HPF
BARBITURATES UR QL SCN: NEGATIVE
BASOPHILS # BLD: 0 K/UL (ref 0–0.1)
BASOPHILS NFR BLD: 0 % (ref 0–1)
BENZODIAZ UR QL: NEGATIVE
BILIRUB UR QL: NEGATIVE
BUN SERPL-MCNC: 15 MG/DL (ref 6–20)
BUN/CREAT SERPL: 13 (ref 12–20)
CALCIUM SERPL-MCNC: 9.3 MG/DL (ref 8.5–10.1)
CANNABINOIDS UR QL SCN: POSITIVE
CHLORIDE SERPL-SCNC: 104 MMOL/L (ref 97–108)
CO2 SERPL-SCNC: 28 MMOL/L (ref 21–32)
COCAINE UR QL SCN: POSITIVE
COLOR UR: ABNORMAL
CREAT SERPL-MCNC: 1.12 MG/DL (ref 0.55–1.02)
DRUG SCRN COMMENT,DRGCM: ABNORMAL
EOSINOPHIL # BLD: 0.1 K/UL (ref 0–0.4)
EOSINOPHIL NFR BLD: 1 % (ref 0–7)
EPITH CASTS URNS QL MICRO: ABNORMAL /LPF
ERYTHROCYTE [DISTWIDTH] IN BLOOD BY AUTOMATED COUNT: 13.7 % (ref 11.5–14.5)
ETHANOL SERPL-MCNC: <10 MG/DL
GLUCOSE SERPL-MCNC: 86 MG/DL (ref 65–100)
GLUCOSE UR STRIP.AUTO-MCNC: NEGATIVE MG/DL
HCG UR QL: NEGATIVE
HCT VFR BLD AUTO: 43.6 % (ref 35–47)
HGB BLD-MCNC: 14.1 G/DL (ref 11.5–16)
HGB UR QL STRIP: NEGATIVE
KETONES UR QL STRIP.AUTO: NEGATIVE MG/DL
LEUKOCYTE ESTERASE UR QL STRIP.AUTO: NEGATIVE
LYMPHOCYTES # BLD: 4.6 K/UL (ref 0.8–3.5)
LYMPHOCYTES NFR BLD: 38 % (ref 12–49)
MCH RBC QN AUTO: 28.3 PG (ref 26–34)
MCHC RBC AUTO-ENTMCNC: 32.3 G/DL (ref 30–36.5)
MCV RBC AUTO: 87.4 FL (ref 80–99)
METHADONE UR QL: NEGATIVE
MONOCYTES # BLD: 1 K/UL (ref 0–1)
MONOCYTES NFR BLD: 8 % (ref 5–13)
MUCOUS THREADS URNS QL MICRO: ABNORMAL /LPF
NEUTS SEG # BLD: 6.4 K/UL (ref 1.8–8)
NEUTS SEG NFR BLD: 53 % (ref 32–75)
NITRITE UR QL STRIP.AUTO: NEGATIVE
OPIATES UR QL: POSITIVE
PCP UR QL: NEGATIVE
PH UR STRIP: 6 [PH] (ref 5–8)
PLATELET # BLD AUTO: 418 K/UL (ref 150–400)
POTASSIUM SERPL-SCNC: 4.2 MMOL/L (ref 3.5–5.1)
PROT UR STRIP-MCNC: NEGATIVE MG/DL
RBC # BLD AUTO: 4.99 M/UL (ref 3.8–5.2)
RBC #/AREA URNS HPF: ABNORMAL /HPF (ref 0–5)
SODIUM SERPL-SCNC: 141 MMOL/L (ref 136–145)
SP GR UR REFRACTOMETRY: 1.02 (ref 1–1.03)
UA: UC IF INDICATED,UAUC: ABNORMAL
UROBILINOGEN UR QL STRIP.AUTO: 0.2 EU/DL (ref 0.2–1)
WBC # BLD AUTO: 12.1 K/UL (ref 3.6–11)
WBC URNS QL MICRO: ABNORMAL /HPF (ref 0–4)

## 2017-10-26 PROCEDURE — 87147 CULTURE TYPE IMMUNOLOGIC: CPT | Performed by: EMERGENCY MEDICINE

## 2017-10-26 PROCEDURE — 81025 URINE PREGNANCY TEST: CPT

## 2017-10-26 PROCEDURE — 85025 COMPLETE CBC W/AUTO DIFF WBC: CPT | Performed by: EMERGENCY MEDICINE

## 2017-10-26 PROCEDURE — 81001 URINALYSIS AUTO W/SCOPE: CPT | Performed by: EMERGENCY MEDICINE

## 2017-10-26 PROCEDURE — 80307 DRUG TEST PRSMV CHEM ANLYZR: CPT | Performed by: EMERGENCY MEDICINE

## 2017-10-26 PROCEDURE — 87086 URINE CULTURE/COLONY COUNT: CPT | Performed by: EMERGENCY MEDICINE

## 2017-10-26 PROCEDURE — 36415 COLL VENOUS BLD VENIPUNCTURE: CPT | Performed by: EMERGENCY MEDICINE

## 2017-10-26 PROCEDURE — 74011250637 HC RX REV CODE- 250/637: Performed by: PSYCHIATRY & NEUROLOGY

## 2017-10-26 PROCEDURE — 65220000003 HC RM SEMIPRIVATE PSYCH

## 2017-10-26 PROCEDURE — 90791 PSYCH DIAGNOSTIC EVALUATION: CPT

## 2017-10-26 PROCEDURE — 99285 EMERGENCY DEPT VISIT HI MDM: CPT

## 2017-10-26 PROCEDURE — 80048 BASIC METABOLIC PNL TOTAL CA: CPT | Performed by: EMERGENCY MEDICINE

## 2017-10-26 RX ORDER — IBUPROFEN 200 MG
1 TABLET ORAL
Status: DISCONTINUED | OUTPATIENT
Start: 2017-10-26 | End: 2017-10-30 | Stop reason: HOSPADM

## 2017-10-26 RX ORDER — BENZTROPINE MESYLATE 1 MG/ML
2 INJECTION INTRAMUSCULAR; INTRAVENOUS
Status: DISCONTINUED | OUTPATIENT
Start: 2017-10-26 | End: 2017-10-30 | Stop reason: HOSPADM

## 2017-10-26 RX ORDER — PROMETHAZINE HYDROCHLORIDE 25 MG/1
25 TABLET ORAL
Status: DISCONTINUED | OUTPATIENT
Start: 2017-10-26 | End: 2017-10-30 | Stop reason: HOSPADM

## 2017-10-26 RX ORDER — METHADONE HYDROCHLORIDE 10 MG/1
30 TABLET ORAL ONCE
Status: COMPLETED | OUTPATIENT
Start: 2017-10-27 | End: 2017-10-26

## 2017-10-26 RX ORDER — IBUPROFEN 400 MG/1
400 TABLET ORAL
Status: DISCONTINUED | OUTPATIENT
Start: 2017-10-26 | End: 2017-10-30 | Stop reason: HOSPADM

## 2017-10-26 RX ORDER — BENZTROPINE MESYLATE 2 MG/1
2 TABLET ORAL
Status: DISCONTINUED | OUTPATIENT
Start: 2017-10-26 | End: 2017-10-30 | Stop reason: HOSPADM

## 2017-10-26 RX ORDER — CLONIDINE HYDROCHLORIDE 0.1 MG/1
0.1 TABLET ORAL
Status: DISCONTINUED | OUTPATIENT
Start: 2017-10-26 | End: 2017-10-30 | Stop reason: HOSPADM

## 2017-10-26 RX ORDER — LOPERAMIDE HYDROCHLORIDE 2 MG/1
2 CAPSULE ORAL AS NEEDED
Status: DISCONTINUED | OUTPATIENT
Start: 2017-10-26 | End: 2017-10-30 | Stop reason: HOSPADM

## 2017-10-26 RX ORDER — ACETAMINOPHEN 325 MG/1
650 TABLET ORAL
Status: DISCONTINUED | OUTPATIENT
Start: 2017-10-26 | End: 2017-10-30 | Stop reason: HOSPADM

## 2017-10-26 RX ORDER — ZOLPIDEM TARTRATE 10 MG/1
10 TABLET ORAL
Status: DISCONTINUED | OUTPATIENT
Start: 2017-10-26 | End: 2017-10-30 | Stop reason: HOSPADM

## 2017-10-26 RX ORDER — DICYCLOMINE HYDROCHLORIDE 10 MG/ML
20 INJECTION INTRAMUSCULAR
Status: DISCONTINUED | OUTPATIENT
Start: 2017-10-26 | End: 2017-10-30 | Stop reason: HOSPADM

## 2017-10-26 RX ORDER — LORAZEPAM 1 MG/1
1 TABLET ORAL
Status: DISCONTINUED | OUTPATIENT
Start: 2017-10-26 | End: 2017-10-30 | Stop reason: HOSPADM

## 2017-10-26 RX ORDER — OLANZAPINE 5 MG/1
5 TABLET ORAL
Status: DISCONTINUED | OUTPATIENT
Start: 2017-10-26 | End: 2017-10-30 | Stop reason: HOSPADM

## 2017-10-26 RX ORDER — ADHESIVE BANDAGE
30 BANDAGE TOPICAL DAILY PRN
Status: DISCONTINUED | OUTPATIENT
Start: 2017-10-26 | End: 2017-10-30 | Stop reason: HOSPADM

## 2017-10-26 RX ORDER — LORAZEPAM 2 MG/ML
2 INJECTION INTRAMUSCULAR
Status: DISCONTINUED | OUTPATIENT
Start: 2017-10-26 | End: 2017-10-30 | Stop reason: HOSPADM

## 2017-10-26 RX ADMIN — METHADONE HYDROCHLORIDE 30 MG: 10 TABLET ORAL at 23:41

## 2017-10-26 NOTE — IP AVS SNAPSHOT
[facility] 
 
 
 [address] Patient: Luis Alberto Tillman MRN: [id number] :1990 About your hospitalization You were admitted on:  2017 You last received care in the:  [facility]. 291 You were discharged on:  2017 Why you were hospitalized Your primary diagnosis was:  Substance Induced Mood Disorder (Hcc) Your diagnoses also included:  Polysubstance Dependence (Hcc) Things You Need To Do (next 8 weeks) Follow up with PROVIDER UNKNOWN Where:  Patient not available to ask Follow up with [facility] [id number]/31/17 @ 8:00am-11:00AM @ 12:00PM-3:00PM for substance abuse intake Phone:  [phone] Where:  [phone] [phone] Discharge Orders None A check mariangel indicates which time of day the medication should be taken. My Medications TAKE these medications as instructed Instructions Each Dose to Equal  
 Morning Noon Evening Bedtime  
 albuterol 90 mcg/actuation inhaler Commonly known as:  PROAIR HFA Your last dose was: Your next dose is: Take 2 Puffs by inhalation every six (6) hours as needed for Wheezing. 2 Puff Discharge Instructions DISCHARGE SUMMARY from Nurse PATIENT INSTRUCTIONS: 
 
 
What to do at Home: 
Recommended activity: Activity as tolerated, If I feel that I can not keep these promises and I am at risk of hurting myself or others, I will call the crisis office and speak with a crisis worker who will assist me during my crisis. Landrum Race  [phone] [address] [place] [address] 19   [phone] [address] [place] [address] [facility]  [phone] [facility]  [phone] *  Please give a list of your current medications to your Primary Care Provider. *  Please update this list whenever your medications are discontinued, doses are 
    changed, or new medications (including over-the-counter products) are added. *  Please carry medication information at all times in case of emergency situations. These are general instructions for a healthy lifestyle: No smoking/ No tobacco products/ Avoid exposure to second hand smoke Surgeon General's Warning:  Quitting smoking now greatly reduces serious risk to your health. Obesity, smoking, and sedentary lifestyle greatly increases your risk for illness A healthy diet, regular physical exercise & weight monitoring are important for maintaining a healthy lifestyle Recognize signs and symptoms of STROKE: 
 
F-face looks uneven A-arms unable to move or move unevenly S-speech slurred or non-existent T-time-call 911 as soon as signs and symptoms begin-DO NOT go Back to bed or wait to see if you get better-TIME IS BRAIN. Warning Signs of HEART ATTACK Call 911 if you have these symptoms: 
? Chest discomfort. Most heart attacks involve discomfort in the center of the chest that lasts more than a few minutes, or that goes away and comes back. It can feel like uncomfortable pressure, squeezing, fullness, or pain. ? Discomfort in other areas of the upper body. Symptoms can include pain or discomfort in one or both arms, the back, neck, jaw, or stomach. ? Shortness of breath with or without chest discomfort. ? Other signs may include breaking out in a cold sweat, nausea, or lightheadedness. Don't wait more than five minutes to call [address]! Fast action can save your life. Calling 911 is almost always the fastest way to get lifesaving treatment. Emergency Medical Services staff can begin treatment when they arrive  up to an hour sooner than if someone gets to the hospital by car. The discharge information has been reviewed with the patient. The patient verbalized understanding. Discharge medications reviewed with the patient and appropriate educational materials and side effects teaching were provided. ___________________________________________________________________________________________________________________________________ [employer] Announcement We are excited to announce that we are making your provider's discharge notes available to you in [email or web address]. You will see these notes when they are completed and signed by the physician that discharged you from your recent hospital stay. If you have any questions or concerns about any information you see in [email or web address], please call the Health Information Department where you were seen or reach out to your Primary Care Provider for more information about your plan of care. Introducing \[facility]\"" & HEALTH SERVICES! Chandan Dent introduces [email or web address].fr patient portal. Now you can access parts of your medical record, email your doctor's office, and request medication refills online. 1. In your internet browser, go to [email or web address] 2. Click on the First Time User? Click Here link in the Sign In box. You will see the New Member Sign Up page. 3. Enter your [email or web address] Access Code exactly as it appears below. You will not need to use this code after youve completed the sign-up process. If you do not sign up before the expiration date, you must request a new code. · [email or web address] Access Code: [email or web address] Expires: 12/25/2017  8:14 PM 
 
4. Enter the last four digits of your Social Security Number (xxxx) and Date of Birth (mm/dd/yyyy) as indicated and click Submit. You will be taken to the next sign-up page. 5. Create a Cloudy.fr ID. This will be your MyChart login ID and cannot be changed, so think of one that is secure and easy to remember. 6. Create a [email or web address].fr password. You can change your password at any time. 7. Enter your Password Reset Question and Answer. This can be used at a later time if you forget your password. 8. Enter your e-mail address. You will receive e-mail notification when new information is available in [address] 9. Click Sign Up. You can now view and download portions of your medical record. 10. Click the Download Summary menu link to download a portable copy of your medical information. If you have questions, please visit the Frequently Asked Questions section of the [employer] website. Remember, [employer] is NOT to be used for urgent needs. For medical emergencies, dial 911. Now available from your iPhone and Android! Providers Seen During Your Hospitalization Provider Specialty Primary office phone Flora Colon MD Emergency Medicine [phone] Marbin Tomlinson MD Psychiatry [phone] Didier Gan MD Psychiatry [phone] Immunizations Administered for This Admission Name Date Influenza Vaccine (Quad) PF 10/27/2017 Your Primary Care Physician (PCP) Primary Care Physician Office Phone Office Fax UNKNOWN, PROVIDER ** None ** ** None ** You are allergic to the following No active allergies Recent Documentation Height Weight Breastfeeding? BMI OB Status Smoking Status 1.676 m 56.7 kg No 20.18 kg/m2 Having regular periods Current Every Day Smoker Emergency Contacts Name Discharge Info Relation Home Work Mobile Pushpa Appiah DISCHARGE CAREGIVER [3] Friend [5] [phone] [place] Hayne[place] N/A  AT THIS TIME [6] Mother [14] [phone] Patient Belongings The following personal items are in your possession at time of discharge: 
  Dental Appliances: None  Visual Aid: None      Home Medications: None   Jewelry: Earrings  Clothing: Footwear, Shirt, Pants, Jacket/Coat, Undergarments, With patient    Other Valuables: Cell Phone  Personal Items Sent to Safe: cell phone Please provide this summary of care documentation to your next provider. Signatures-by signing, you are acknowledging that this After Visit Summary has been reviewed with you and you have received a copy. Patient Signature:  ____________________________________________________________ Date:  ____________________________________________________________  
  
Orbie Mendiola Provider Signature:  ____________________________________________________________ Date:  ____________________________________________________________

## 2017-10-26 NOTE — IP AVS SNAPSHOT
Summary of Care Report The Summary of Care report has been created to help improve care coordination. Users with access to [employer] or [address] (Web-based application) may access additional patient information including the Discharge Summary. If you are not currently a [address] user and need more information, please call the number listed below in the [place] [address] section and ask to be connected with Medical Records. Facility Information Name Address Phone [facility] [address] [phone] Patient Information Patient Name Sex  Naomi Thompson ([id number]) Female 1990 Discharge Information Admitting Provider Service Area Unit Laine Deluca MD / [phone] [id number] [address] [facility] / [phone] Discharge Provider Discharge Date/Time Discharge Disposition Destination (none) 10/30/2017 Afternoon (Pending) AHR (none) Patient Language Language ENGLISH [13] Hospital Problems as of 10/30/2017  Reviewed: 2013  1:25 PM by Wayne Dove NP Class Noted - Resolved Last Modified POA Active Problems * (Principal)Substance induced mood disorder ([facility] [address]  10/26/2017 - Present 10/27/2017 by Tanya Hankins MD Unknown Entered by Laine Deluca MD  
  Polysubstance dependence [facility])  10/27/2017 - Present 10/27/2017 by Tanya Hankins MD Yes Entered by Tanya Hankins MD  
  
Non-Hospital Problems as of 10/30/2017  Reviewed: 2013  1:25 PM by Wayne Dove NP Class Noted - Resolved Last Modified Active Problems ADHD (attention deficit hyperactivity disorder)  2013 - Present 2013 by Wayne Dove NP Entered by Wayne Dove NP   Opiate dependence ([facility] [address]  10/27/2017 - Present 10/27/2017 by Tanya Hankins MD  
 Entered by Tanya Hankins MD  
  
You are allergic to the following No active allergies Current Discharge Medication List  
  
CONTINUE these medications which have NOT CHANGED Dose & Instructions Dispensing Information Comments  
 albuterol 90 mcg/actuation inhaler Commonly known as:  PROAIR HFA Dose:  2 Puff Take 2 Puffs by inhalation every six (6) hours as needed for Wheezing. Quantity:  1 Inhaler Refills:  0 Current Immunizations Name Date Influenza Vaccine (Quad) PF 10/27/2017 Follow-up Information Follow up With Details Comments Contact Info Provider Unknown   Patient not available to ask [employer]  Walk-in [id number]/31/17 @ 8:00am-11:00AM @ 12:00PM-3:00PM for substance abuse intake  [address] [place] [address],[facility] 
[facility] [place] [address] Discharge Instructions DISCHARGE SUMMARY from Nurse PATIENT INSTRUCTIONS: 
 
 
What to do at Home: 
Recommended activity: Activity as tolerated, If I feel that I can not keep these promises and I am at risk of hurting myself or others, I will call the crisis office and speak with a crisis worker who will assist me during my crisis. [address] [place] [address]  [phone] [address] [place] [address]   [phone] [address] [facility] Crisis  [phone] [facility] Crisis  [phone] *  Please give a list of your current medications to your Primary Care Provider. *  Please update this list whenever your medications are discontinued, doses are 
    changed, or new medications (including over-the-counter products) are added. *  Please carry medication information at all times in case of emergency situations. These are general instructions for a healthy lifestyle: No smoking/ No tobacco products/ Avoid exposure to second hand smoke Surgeon General's Warning:  Quitting smoking now greatly reduces serious risk to your health. Obesity, smoking, and sedentary lifestyle greatly increases your risk for illness A healthy diet, regular physical exercise & weight monitoring are important for maintaining a healthy lifestyle Recognize signs and symptoms of STROKE: 
 
F-face looks uneven A-arms unable to move or move unevenly S-speech slurred or non-existent T-time-call 911 as soon as signs and symptoms begin-DO NOT go Back to bed or wait to see if you get better-TIME IS BRAIN. Warning Signs of HEART ATTACK Call 911 if you have these symptoms: 
? Chest discomfort. Most heart attacks involve discomfort in the center of the chest that lasts more than a few minutes, or that goes away and comes back. It can feel like uncomfortable pressure, squeezing, fullness, or pain. ? Discomfort in other areas of the upper body. Symptoms can include pain or discomfort in one or both arms, the back, neck, jaw, or stomach. ? Shortness of breath with or without chest discomfort. ? Other signs may include breaking out in a cold sweat, nausea, or lightheadedness. Don't wait more than five minutes to call [address]! Fast action can save your life. Calling 911 is almost always the fastest way to get lifesaving treatment. Emergency Medical Services staff can begin treatment when they arrive  up to an hour sooner than if someone gets to the hospital by car. The discharge information has been reviewed with the patient. The patient verbalized understanding. Discharge medications reviewed with the patient and appropriate educational materials and side effects teaching were provided. ___________________________________________________________________________________________________________________________________ Chart Review Routing History No Routing History on File

## 2017-10-26 NOTE — BSMART NOTE
Comprehensive Assessment Form Part 1    Section I - Disposition    Axis I - Substance Induced Mood Disorder, Opioid Dependence  Axis II - Deferred  Axis III - Asthma  Axis IV - Problems with primary support system, Problems with housing  [place] - 35    The Medical Doctor to Psychiatrist conference was not completed. The Medical Doctor is in agreement with Psychiatrist disposition because of (reason) patient meets admission criteria and is willing to be admitted. The plan is admit to [facility].  The on-call Psychiatrist consulted was Dr. Gallito Goldberg. The admitting Psychiatrist will be Dr. Gallito Goldberg. The admitting Diagnosis is Substance Induced Mood Disorder. The Payor source is self. Section II - Integrated Summary  Summary:  Patient is a 23year old female seen face to face in the ER. She reported she is \"depressed. I'm going crazy. \"  She endorsed suicidal ideation and stated her plan is to just keep taking pills until she dies. She reports a history of prior attempts via overdose. She was tearful and her affect was flat. She reported she has thought about hurting people because \"I just want to go,\" but has no specific plan. She is not eating and is not sleeping. She is abusing a variety drugs, primarily heroin. Besides what she tested positive for she also admitted to using katy. She reported she began when she lost her disability and Medicaid and could no longer get her psychotropic medications. She reported she used to take an antidepressant and Adderal.  She and her girlfriend are staying with her sister's family. Since they do not have a fixed address she did not receive her disability renewal paperwork and it was cut off. She did go to [facility] but was discouraged by the process to get help. Her girlfriend who brought her in reported that she is concerned about her ability to remain safe. She will assist her with following up at [facility] once she is stabilized.   Patient is willing to be admitted voluntarily. The patient has demonstrated mental capacity to provide informed consent. The information is given by the patient, past medical records and partner. The Chief Complaint is suicidal ideation. The Precipitant Factors are substance abuse, lack of treatment compliance. Previous Hospitalizations: yes, reports [facility] but did not know when  The patient has not previously been in restraints. Current Psychiatrist and/or  is NA. Lethality Assessment:    The potential for suicide noted by the following: defined plan, previous attempts, ideation and current substance abuse. The potential for homicide is noted by the following : ideation. The patient has not been a perpetrator of sexual or physical abuse. There are not pending charges. The patient is felt to be at risk for self harm or harm to others. The attending nurse was advised the patient is at risk for self harm. Section III - Psychosocial  The patient's overall mood and attitude is depressed. Feelings of helplessness and hopelessness are observed by her self report. Generalized anxiety is not observed. Panic is not observed. Phobias are not observed. Obsessive compulsive tendencies are not observed. Section IV - Mental Status Exam  The patient's appearance is tense. The patient's behavior is guarded and is restless. The patient is oriented to time, place, person and situation. The patient's speech is soft. The patient's mood is withdrawn. The range of affect is flat. The patient's thought content demonstrates no evidence of impairment. The thought process shows no evidence of impairment. The patient's perception shows no evidence of impairment. The patient's memory shows no evidence of impairment. The patient's appetite is decreased. The patient's sleep has evidence of insomnia. The patient's insight is blaming. The patient's judgement shows no evidence of impairment.       Section V - Substance Abuse  The patient is using substances. The patient is using alcohol for 5-10 years with last use on unknown, cannabis by inhalation for 1-5 years with last use on unknown, cocaine orally for 1-5 years with last use on unknown and heroin by inhalation for 1-5 years with last use on today. The patient has experienced the following withdrawal symptoms: body aches, cravings, and sleep disturbance. Section VI - Living Arrangements  The patient has a significant other. This person's approximate age is 22 and appears to be in good health. The patient lives with her sisters family and girlfriend. The patient has no children. The patient does plan to return home upon discharge. The patient does not have legal issues pending. The patient's source of income comes from family. Christianity and cultural practices have not been voiced at this time. The patient's greatest support comes from her girlfriend and this person will be involved with the treatment. The patient has not been in an event described as horrible or outside the realm of ordinary life experience either currently or in the past.  The patient has not been a victim of sexual/physical abuse. Section VII - Other Areas of Clinical Concern  The highest grade achieved is unknown with the overall quality of school experience being described as unknown. The patient is currently unemployed and speaks [place] as a primary language. The patient has no communication impairments affecting communication. The patient's preference for learning can be described as: can read and write adequately.   The patient's hearing is normal.  The patient's vision is normal.      Richardson Waggoner

## 2017-10-26 NOTE — ED NOTES
Pt arrives in the ED with complaints of increased depression with SI, drug, and alcohol abuse x 2 months. Pt reports that she no longer wants to live anymore. Pt here with significant other. Pt tearful upon assessment.

## 2017-10-26 NOTE — ED NOTES
Emergency Department Nursing Plan of Care       The Nursing Plan of Care is developed from the Nursing assessment and Emergency Department Attending provider initial evaluation. The plan of care may be reviewed in the ED Provider note.     The Plan of Care was developed with the following considerations:   Patient / Family readiness to learn indicated by:verbalized understanding  Persons(s) to be included in education: patient  Barriers to Learning/Limitations:No    [address]     10/26/2017   6:34 PM

## 2017-10-26 NOTE — ED PROVIDER NOTES
[facility]  EMERGENCY DEPARTMENT HISTORY AND PHYSICAL EXAM         Date of Service: 10/26/2017   Patient Name: Damaris Moser   YOB: 1990  Medical Record Number: [id number]    History of Presenting Illness     Chief Complaint   Patient presents with    Depression     pt is tearful in triage, +SI and HI without a plan, heroin and alcohol abuse, visual/auditory hallucinations x \"months. \"     Drug Problem        History Provided By:  patient and significant other    Additional History:   Damaris Moser is a 23 y.o. female with PMhx significant for ADHD, asthma, substance abuse who presents ambulatory to the ED with cc of seeking detox and help for depression. Patient's significant other states that the patient has been using heroin, cocaine, and other drugs and has reached a \"mental breakdown. \" The patient currently expresses SI and agrees to admission to an inpatient detox program if she meets admission criteria this evening. Her significant other notes that the patient has gone through cycles of attempting to quit, and most recently \"disppeared for a few days\" while attempting to quit on her own. Her PMHx is significant for past suicidal attempts by ingestion of pills. Patient last used any substances today. She has been seen at [facility] and [facility] in the past for these sxs. Social Hx: + Tobacco, + EtOH, + Illicit Drugs    There are no other complaints, changes or physical findings at this time. Primary Care Provider: PROVIDER UNKNOWN   Specialist:     Past History     Past Medical History:   Past Medical History:   Diagnosis Date    ADHD (attention deficit hyperactivity disorder)     Asthma         Past Surgical History:   History reviewed. No pertinent surgical history.      Family History:   Family History   Problem Relation Age of Onset   [facility] Asthma Mother     Hypertension Mother     Asthma Sister     Hypertension Sister     Asthma Brother  Hypertension Brother         Social History:   Social History   Substance Use Topics    Smoking status: Current Every Day Smoker     Packs/day: 1.00    Smokeless tobacco: Never Used    Alcohol use Yes        Allergies:   No Known Allergies     Review of Systems   Review of Systems   Constitutional: Negative for fever. HENT: Negative for sore throat. Eyes: Negative for photophobia and redness. Respiratory: Negative for shortness of breath and wheezing. Cardiovascular: Negative for chest pain and leg swelling. Gastrointestinal: Negative for abdominal pain, blood in stool, nausea and vomiting. Genitourinary: Negative for difficulty urinating, dysuria, hematuria, menstrual problem and vaginal bleeding. Musculoskeletal: Negative for back pain and joint swelling. Neurological: Negative for dizziness, seizures, syncope, speech difficulty, weakness, numbness and headaches. Hematological: Negative for adenopathy. Psychiatric/Behavioral: Positive for dysphoric mood and suicidal ideas. Negative for agitation, confusion and hallucinations (AH/VH). The patient is not nervous/anxious. Positive for SI. Negative for HI       Physical Exam  Physical Exam   Constitutional: She is oriented to person, place, and time. She appears well-developed and well-nourished. No distress. HENT:   Head: Normocephalic and atraumatic. Mouth/Throat: Oropharynx is clear and moist. No oropharyngeal exudate. Eyes: Conjunctivae and EOM are normal. Pupils are equal, round, and reactive to light. Left eye exhibits no discharge. Neck: Normal range of motion. Neck supple. No JVD present. Cardiovascular: Normal rate, regular rhythm, normal heart sounds and intact distal pulses. Pulmonary/Chest: Effort normal and breath sounds normal. No respiratory distress. She has no wheezes. Abdominal: Soft. Bowel sounds are normal. She exhibits no distension. There is no tenderness. There is no rebound and no guarding. Musculoskeletal: Normal range of motion. She exhibits no edema or tenderness. Lymphadenopathy:     She has no cervical adenopathy. Neurological: She is alert and oriented to person, place, and time. She has normal reflexes. No cranial nerve deficit. Skin: Skin is warm and dry. No rash noted. Psychiatric: She has a normal mood and affect. Her behavior is normal. She is not actively hallucinating (AH/VH). She expresses suicidal ideation. She expresses no homicidal ideation. Tearful. No flight of ideas. Nursing note and vitals reviewed. Medical Decision Making   I am the first provider for this patient. I reviewed the vital signs, available nursing notes, past medical history, past surgical history, family history and social history. Provider Notes:   DDx: major depression, SI, substance abuse      ED Course:  6:26 PM  Initial assessment performed. The patients presenting problems have been discussed, and they are in agreement with the care plan formulated and outlined with them. I have encouraged them to ask questions as they arise throughout their visit. Progress Notes:   8:14 PM  Patient and significant other sleeping peacefully in [facility]. Written by Jim Fraser, ED Scribe, as dictated by Kayden Pang MD.       Consult Note:  8:30 PM  Kayden Pang MD spoke with Anu Arroyo,  Specialty: Leone Gosselin  Discussed pt's hx, disposition, and available diagnostic and imaging results. Reviewed care plans. Consultant agrees with plans as outlined. Anu Arroyo states the pt has been accepted to [facility] by Dr. Bia Marrero.    Written by Jim Fraser, ED Scribe, as dictated by Kayden Pang MD.       Procedures: none    Diagnostic Study Results   Labs -      Recent Results (from the past 12 hour(s))   ETHYL ALCOHOL    Collection Time: 10/26/17  6:55 PM   Result Value Ref Range    ALCOHOL(ETHYL),SERUM <10 <10 MG/DL   CBC WITH AUTOMATED DIFF    Collection Time: 10/26/17  6:55 PM   Result Value Ref Range    WBC 12.1 (H) 3.6 - 11.0 K/uL    RBC 4.99 3.80 - 5.20 M/uL    HGB 14.1 11.5 - 16.0 g/dL    HCT 43.6 35.0 - 47.0 %    MCV 87.4 80.0 - 99.0 FL    MCH 28.3 26.0 - 34.0 PG    MCHC 32.3 30.0 - 36.5 g/dL    RDW 13.7 11.5 - 14.5 %    PLATELET 778 (H) 267 - 400 K/uL    NEUTROPHILS 53 32 - 75 %    LYMPHOCYTES 38 12 - 49 %    MONOCYTES 8 5 - 13 %    EOSINOPHILS 1 0 - 7 %    BASOPHILS 0 0 - 1 %    ABS. NEUTROPHILS 6.4 1.8 - 8.0 K/UL    ABS. LYMPHOCYTES 4.6 (H) 0.8 - 3.5 K/UL    ABS. MONOCYTES 1.0 0.0 - 1.0 K/UL    ABS. EOSINOPHILS 0.1 0.0 - 0.4 K/UL    ABS.  BASOPHILS 0.0 0.0 - 0.1 K/UL   METABOLIC PANEL, BASIC    Collection Time: 10/26/17  6:55 PM   Result Value Ref Range    Sodium 141 136 - 145 mmol/L    Potassium 4.2 3.5 - 5.1 mmol/L    Chloride 104 97 - 108 mmol/L    CO2 28 21 - 32 mmol/L    Anion gap 9 5 - 15 mmol/L    Glucose 86 65 - 100 mg/dL    BUN 15 6 - 20 MG/DL    Creatinine 1.12 (H) 0.55 - 1.02 MG/DL    BUN/Creatinine ratio 13 12 - 20      GFR est AA >60 >60 ml/min/1.73m2    GFR est non-AA 59 (L) >60 ml/min/1.73m2    Calcium 9.3 8.5 - 10.1 MG/DL   HCG URINE, QL. - POC    Collection Time: 10/26/17  7:41 PM   Result Value Ref Range    Pregnancy test,urine (POC) NEGATIVE  NEG     DRUG SCREEN, URINE    Collection Time: 10/26/17  7:44 PM   Result Value Ref Range    AMPHETAMINES NEGATIVE  NEG      BARBITURATES NEGATIVE  NEG      BENZODIAZEPINES NEGATIVE  NEG      COCAINE POSITIVE (A) NEG      METHADONE NEGATIVE  NEG      OPIATES POSITIVE (A) NEG      PCP(PHENCYCLIDINE) NEGATIVE  NEG      THC (TH-CANNABINOL) POSITIVE (A) NEG      Drug screen comment (NOTE)    URINALYSIS W/ REFLEX CULTURE    Collection Time: 10/26/17  7:44 PM   Result Value Ref Range    Color YELLOW/STRAW      Appearance CLOUDY (A) CLEAR      Specific gravity 1.020 1.003 - 1.030      pH (UA) 6.0 5.0 - 8.0      Protein NEGATIVE  NEG mg/dL    Glucose NEGATIVE  NEG mg/dL    Ketone NEGATIVE  NEG mg/dL    Bilirubin NEGATIVE  NEG      Blood NEGATIVE  NEG Urobilinogen 0.2 0.2 - 1.0 EU/dL    Nitrites NEGATIVE  NEG      Leukocyte Esterase NEGATIVE  NEG      WBC 0-4 0 - 4 /hpf    RBC 0-5 0 - 5 /hpf    Epithelial cells MANY (A) FEW /lpf    Bacteria 3+ (A) NEG /hpf    UA:UC IF INDICATED URINE CULTURE ORDERED (A) CNI      Mucus 3+ (A) NEG /lpf       Vital Signs-Reviewed the patient's vital signs. Patient Vitals for the past 12 hrs:   Temp Pulse Resp BP SpO2   10/26/17 1822 98.7 °F (37.1 °C) 81 18 139/86 100 %       Medications Given in the ED:  Medications - No data to display    Diagnosis:  Clinical Impression:   1. Depression, unspecified depression type         Plan:  1. Admit to [facility]. Admit Note:  8:30 PM  Pt is being admitted by Dr. Asia Bowser, psychiatry. The results of their tests and reason(s) for their admission have been discussed with pt and/or available family. They convey agreement and understanding for the need to be admitted and for admission diagnosis. _______________________________   Attestations: This note is prepared by Lashawn Jennings, acting as Scribe for Nolan Osler, MD.       The scribe's documentation has been prepared under my direction and personally reviewed by me in its entirety.  I confirm that the note above accurately reflects all work, treatment, procedures, and medical decision making performed by me.    _______________________________

## 2017-10-26 NOTE — IP AVS SNAPSHOT
Mary Murillo 
 
 
 [address] Patient: Lc Johansen MRN: [id number] :1990 My Medications TAKE these medications as instructed Instructions Each Dose to Equal  
 Morning Noon Evening Bedtime  
 albuterol 90 mcg/actuation inhaler Commonly known as:  PROAIR HFA Your last dose was: Your next dose is: Take 2 Puffs by inhalation every six (6) hours as needed for Wheezing. 2 Puff

## 2017-10-27 PROBLEM — F19.10 POLYSUBSTANCE ABUSE (HCC): Status: ACTIVE | Noted: 2017-10-27

## 2017-10-27 PROBLEM — F11.20 OPIATE DEPENDENCE (HCC): Status: ACTIVE | Noted: 2017-10-27

## 2017-10-27 PROBLEM — F19.20 POLYSUBSTANCE DEPENDENCE (HCC): Status: ACTIVE | Noted: 2017-10-27

## 2017-10-27 PROCEDURE — 65220000003 HC RM SEMIPRIVATE PSYCH

## 2017-10-27 PROCEDURE — 90686 IIV4 VACC NO PRSV 0.5 ML IM: CPT | Performed by: PSYCHIATRY & NEUROLOGY

## 2017-10-27 PROCEDURE — 74011250637 HC RX REV CODE- 250/637: Performed by: PSYCHIATRY & NEUROLOGY

## 2017-10-27 PROCEDURE — 74011250636 HC RX REV CODE- 250/636: Performed by: PSYCHIATRY & NEUROLOGY

## 2017-10-27 PROCEDURE — 90471 IMMUNIZATION ADMIN: CPT

## 2017-10-27 RX ORDER — ALBUTEROL SULFATE 90 UG/1
2 AEROSOL, METERED RESPIRATORY (INHALATION)
Status: DISCONTINUED | OUTPATIENT
Start: 2017-10-27 | End: 2017-10-30 | Stop reason: HOSPADM

## 2017-10-27 RX ADMIN — ZOLPIDEM TARTRATE 10 MG: 10 TABLET, FILM COATED ORAL at 21:16

## 2017-10-27 RX ADMIN — METHADONE HYDROCHLORIDE 15 MG: 10 TABLET ORAL at 08:34

## 2017-10-27 RX ADMIN — INFLUENZA VIRUS VACCINE 0.5 ML: 15; 15; 15; 15 SUSPENSION INTRAMUSCULAR at 08:34

## 2017-10-27 NOTE — H&P
INITIAL PSYCHIATRIC EVALUATION            IDENTIFICATION:    Patient Name  Yolanda Kussmaul   Date of Birth 1990   [facility] [id number]   Medical Record Number  [id number]      Age  23 y.o. PCP PROVIDER UNKNOWN   Admit date:  10/26/2017    Room Number  323/01  @ [facility]   Date of Service  10/27/2017            HISTORY         REASON FOR HOSPITALIZATION:  CC: \"feeling sick\". Pt admitted under a voluntary basis for polysubstance abuse, suicidal ideation proving to be an imminent danger to self . HISTORY OF PRESENT ILLNESS:    The patient, Yolanda Kussmaul, is a 23 y.o. BLACK OR  female with a past psychiatric history significant for polysubstance use, who presents at this time with complaints of (and/or evidence of) the following emotional symptoms: suicidal thoughts/threats. Additional symptomatology include regular heroin abuse, katy use ( +cocaine and THC), mild w/d sx- nausea, cramps, anxiety. pt is preoccupied with her stressor and felt worthless and hopeless. Currently feel safe in the hospital. Denies alessandra or psychosis. The above symptoms have been present for 1 day. These symptoms are of moderate severity. These symptoms are intermittent/ fleeting in nature. The patient's condition has been precipitated by and psychosocial stressors (financial- lost disability, drug use ). Patient's condition made worse by continued illicit drug use and alcohol use as well as treatment noncompliance. UDS: +MJ, cocaine and opiate; BAL=0. Per initial screening \"Patient is a 23year old female seen face to face in the ER. She reported she is \"depressed. I'm going crazy. \"  She endorsed suicidal ideation and stated her plan is to just keep taking pills until she dies. She reports a history of prior attempts via overdose. She was tearful and her affect was flat. She reported she has thought about hurting people because \"I just want to go,\" but has no specific plan.   She is not eating and is not sleeping. She is abusing a variety drugs, primarily heroin. Besides what she tested positive for she also admitted to using katy. She reported she began when she lost her disability and Medicaid and could no longer get her psychotropic medications. She reported she used to take an antidepressant and Adderal.  She and her girlfriend are staying with her sister's family. Since they do not have a fixed address she did not receive her disability renewal paperwork and it was cut off. She did go to [facility] but was discouraged by the process to get help. Her girlfriend who brought her in reported that she is concerned about her ability to remain safe. She will assist her with following up at [facility] once she is stabilized. Patient is willing to be admitted voluntarily\"     ALLERGIES: No Known Allergies   MEDICATIONS PRIOR TO ADMISSION:   Prescriptions Prior to Admission   Medication Sig    albuterol (PROAIR HFA) 90 mcg/actuation inhaler Take 2 Puffs by inhalation every six (6) hours as needed for Wheezing. PAST MEDICAL HISTORY:   Past Medical History:   Diagnosis Date    ADHD (attention deficit hyperactivity disorder)     Asthma    History reviewed. No pertinent surgical history. SOCIAL HISTORY:    Social History     Social History    Marital status: SINGLE     Spouse name: N/A    Number of children: N/A    Years of education: N/A     Occupational History    Not on file. Social History Main Topics    Smoking status: Current Every Day Smoker     Packs/day: 1.00    Smokeless tobacco: Never Used    Alcohol use Yes    Drug use: Yes     Special: Marijuana, Other, Heroin      Comment: Katy, heroin use in the last few month. no rehab    Sexual activity: Yes     Partners: Female     Other Topics Concern    Not on file     Social History Narrative    ** Merged History Encounter **         ** Merged History Encounter **         Live with her girlfriend and sister.  Was on disability but recently lost her disability. No legal problem reported. Started seeing psychiatrist- age 7 behavior problem. Reports recent psych hospitalization at [facility] for possible OD/drug abuse    No abuse reported. FAMILY HISTORY: History reviewed. No pertinent family history. Family History   Problem Relation Age of Onset   Abdoul Mariann Asthma Mother     Hypertension Mother     Asthma Sister     Hypertension Sister     Asthma Brother     Hypertension Brother     Depression Brother        REVIEW OF SYSTEMS:   Psychological ROS: positive for - anxiety, behavioral disorder, irritability and suicidal ideation  Pertinent items are noted in the History of Present Illness. All other Systems reviewed and are considered negative. MENTAL STATUS EXAM & VITALS     MENTAL STATUS EXAM (MSE):    MSE FINDINGS ARE WITHIN NORMAL LIMITS (WNL) UNLESS OTHERWISE STATED BELOW. ( ALL OF THE BELOW CATEGORIES OF THE MSE HAVE BEEN REVIEWED (reviewed 10/27/2017) AND UPDATED AS DEEMED APPROPRIATE )  General Presentation age appropriate and disheveled, unreliable and vague   Orientation oriented to time, place and person   Vital Signs  See below (reviewed 10/27/2017); Vital Signs (BP, Pulse, & Temp) are within normal limits if not listed below.    Gait and Station Stable/steady, no ataxia   Musculoskeletal System No extrapyramidal symptoms (EPS); no abnormal muscular movements or Tardive Dyskinesia (TD); muscle strength and tone are within normal limits   Language No aphasia or dysarthria   Speech:  hypoverbal   Thought Processes logical; slow rate of thoughts; fair abstract reasoning/computation   Thought Associations goal directed   Thought Content free of delusions, free of hallucinations and preoccupations   Suicidal Ideations none and contracts for safety   Homicidal Ideations none   Mood:  anxious  and sad   Affect:  anxious, mood-congruent and sad   Memory recent  intact   Memory remote:  intact Concentration/Attention:  intact   Fund of Knowledge average   Insight:  limited   Reliability poor   Judgment:  poor          VITALS:     Patient Vitals for the past 24 hrs:   Temp Pulse Resp BP SpO2   10/27/17 0554 97 °F (36.1 °C) 71 16 101/62 -   10/26/17 2302 99.5 °F (37.5 °C) 86 18 116/80 -   10/26/17 2230 98.4 °F (36.9 °C) 78 18 127/82 99 %   10/26/17 1822 98.7 °F (37.1 °C) 81 18 139/86 100 %     Wt Readings from Last 3 Encounters:   10/26/17 56.7 kg (125 lb)   09/26/17 54.9 kg (121 lb)   08/26/16 59 kg (130 lb)     Temp Readings from Last 3 Encounters:   10/27/17 97 °F (36.1 °C)   09/26/17 98.2 °F (36.8 °C)   04/15/17 97.3 °F (36.3 °C)     BP Readings from Last 3 Encounters:   10/27/17 101/62   09/26/17 109/66   04/15/17 119/54     Pulse Readings from Last 3 Encounters:   10/27/17 71   09/26/17 96   04/15/17 (!) 126            DATA     LABORATORY DATA:  Labs Reviewed   DRUG SCREEN, URINE - Abnormal; Notable for the following:        Result Value    COCAINE POSITIVE (*)     OPIATES POSITIVE (*)     THC (TH-CANNABINOL) POSITIVE (*)     All other components within normal limits   URINALYSIS W/ REFLEX CULTURE - Abnormal; Notable for the following:     Appearance CLOUDY (*)     Epithelial cells MANY (*)     Bacteria 3+ (*)     UA:UC IF INDICATED URINE CULTURE ORDERED (*)     Mucus 3+ (*)     All other components within normal limits   CBC WITH AUTOMATED DIFF - Abnormal; Notable for the following:     WBC 12.1 (*)     PLATELET 120 (*)     ABS.  LYMPHOCYTES 4.6 (*)     All other components within normal limits   METABOLIC PANEL, BASIC - Abnormal; Notable for the following:     Creatinine 1.12 (*)     GFR est non-AA 59 (*)     All other components within normal limits   CULTURE, URINE   ETHYL ALCOHOL   CBC WITH AUTOMATED DIFF   METABOLIC PANEL, BASIC   ETHYL ALCOHOL   HCG URINE, QL. - POC   POC URINE PREGNANCY TEST     Admission on 10/26/2017   Component Date Value Ref Range Status    AMPHETAMINES 10/26/2017 NEGATIVE   NEG   Final    BARBITURATES 10/26/2017 NEGATIVE   NEG   Final    BENZODIAZEPINES 10/26/2017 NEGATIVE   NEG   Final    COCAINE 10/26/2017 POSITIVE* NEG   Final    METHADONE 10/26/2017 NEGATIVE   NEG   Final    OPIATES 10/26/2017 POSITIVE* NEG   Final    PCP(PHENCYCLIDINE) 10/26/2017 NEGATIVE   NEG   Final    THC (TH-CANNABINOL) 10/26/2017 POSITIVE* NEG   Final    Drug screen comment 10/26/2017 (NOTE)   Final    Color 10/26/2017 YELLOW/STRAW    Final    Appearance 10/26/2017 CLOUDY* CLEAR   Final    Specific gravity 10/26/2017 1.020  1.003 - 1.030   Final    pH (UA) 10/26/2017 6.0  5.0 - 8.0   Final    Protein 10/26/2017 NEGATIVE   NEG mg/dL Final    Glucose 10/26/2017 NEGATIVE   NEG mg/dL Final    Ketone 10/26/2017 NEGATIVE   NEG mg/dL Final    Bilirubin 10/26/2017 NEGATIVE   NEG   Final    Blood 10/26/2017 NEGATIVE   NEG   Final    Urobilinogen 10/26/2017 0.2  0.2 - 1.0 EU/dL Final    Nitrites 10/26/2017 NEGATIVE   NEG   Final    Leukocyte Esterase 10/26/2017 NEGATIVE   NEG   Final    WBC 10/26/2017 0-4  0 - 4 /hpf Final    RBC 10/26/2017 0-5  0 - 5 /hpf Final    Epithelial cells 10/26/2017 MANY* FEW /lpf Final    Bacteria 10/26/2017 3+* NEG /hpf Final    UA:UC IF INDICATED 10/26/2017 URINE CULTURE ORDERED* CNI   Final    Mucus 10/26/2017 3+* NEG /lpf Final    ALCOHOL(ETHYL),SERUM 10/26/2017 <10  <10 MG/DL Final    WBC 10/26/2017 12.1* 3.6 - 11.0 K/uL Final    RBC 10/26/2017 4.99  3.80 - 5.20 M/uL Final    HGB 10/26/2017 14.1  11.5 - 16.0 g/dL Final    HCT 10/26/2017 43.6  35.0 - 47.0 % Final    MCV 10/26/2017 87.4  80.0 - 99.0 FL Final    MCH 10/26/2017 28.3  26.0 - 34.0 PG Final    MCHC 10/26/2017 32.3  30.0 - 36.5 g/dL Final    RDW 10/26/2017 13.7  11.5 - 14.5 % Final    PLATELET 21/85/0129 572* 150 - 400 K/uL Final    NEUTROPHILS 10/26/2017 53  32 - 75 % Final    LYMPHOCYTES 10/26/2017 38  12 - 49 % Final    MONOCYTES 10/26/2017 8  5 - 13 % Final    EOSINOPHILS 10/26/2017 1  0 - 7 % Final    BASOPHILS 10/26/2017 0  0 - 1 % Final    ABS. NEUTROPHILS 10/26/2017 6.4  1.8 - 8.0 K/UL Final    ABS. LYMPHOCYTES 10/26/2017 4.6* 0.8 - 3.5 K/UL Final    ABS. MONOCYTES 10/26/2017 1.0  0.0 - 1.0 K/UL Final    ABS. EOSINOPHILS 10/26/2017 0.1  0.0 - 0.4 K/UL Final    ABS. BASOPHILS 10/26/2017 0.0  0.0 - 0.1 K/UL Final    Sodium 10/26/2017 141  136 - 145 mmol/L Final    Potassium 10/26/2017 4.2  3.5 - 5.1 mmol/L Final    Chloride 10/26/2017 104  97 - 108 mmol/L Final    CO2 10/26/2017 28  21 - 32 mmol/L Final    Anion gap 10/26/2017 9  5 - 15 mmol/L Final    Glucose 10/26/2017 86  65 - 100 mg/dL Final    BUN 10/26/2017 15  6 - 20 MG/DL Final    Creatinine 10/26/2017 1.12* 0.55 - 1.02 MG/DL Final    BUN/Creatinine ratio 10/26/2017 13  12 - 20   Final    GFR est AA 10/26/2017 >60  >60 ml/min/1.73m2 Final    GFR est non-AA 10/26/2017 59* >60 ml/min/1.73m2 Final    Calcium 10/26/2017 9.3  8.5 - 10.1 MG/DL Final    Pregnancy test,urine (POC) 10/26/2017 NEGATIVE   NEG   Final        RADIOLOGY REPORTS:    Results from Hospital Encounter encounter on 04/15/17   XR CHEST PA LAT   Narrative EXAM:  XR CHEST PA LAT    INDICATION:  SOB, Hx of asthma    COMPARISON: 2016. FINDINGS: A single view of the chest demonstrates clear lungs. The cardiac and  mediastinal contours and pulmonary vascularity are normal.  The bones and soft  tissues are within normal limits. Impression IMPRESSION: No acute abnormality identified. No results found.            MEDICATIONS       ALL MEDICATIONS  Current Facility-Administered Medications   Medication Dose Route Frequency    albuterol (PROVENTIL HFA, VENTOLIN HFA, PROAIR HFA) inhaler 2 Puff  2 Puff Inhalation Q4H PRN    ziprasidone (GEODON) 20 mg in sterile water (preservative free) 1 mL injection  20 mg IntraMUSCular BID PRN    OLANZapine (ZyPREXA) tablet 5 mg  5 mg Oral Q6H PRN    benztropine (COGENTIN) tablet 2 mg  2 mg Oral BID PRN    benztropine (COGENTIN) injection 2 mg  2 mg IntraMUSCular BID PRN    LORazepam (ATIVAN) injection 2 mg  2 mg IntraMUSCular Q4H PRN    LORazepam (ATIVAN) tablet 1 mg  1 mg Oral Q4H PRN    zolpidem (AMBIEN) tablet 10 mg  10 mg Oral QHS PRN    acetaminophen (TYLENOL) tablet 650 mg  650 mg Oral Q4H PRN    ibuprofen (MOTRIN) tablet 400 mg  400 mg Oral Q8H PRN    magnesium hydroxide (MILK OF MAGNESIA) 400 mg/5 mL oral suspension 30 mL  30 mL Oral DAILY PRN    nicotine (NICODERM CQ) 21 mg/24 hr patch 1 Patch  1 Patch TransDERmal DAILY PRN    promethazine (PHENERGAN) tablet 25 mg  25 mg Oral Q6H PRN    loperamide (IMODIUM) capsule 2 mg  2 mg Oral PRN    dicyclomine (BENTYL) 10 mg/mL injection 20 mg  20 mg IntraMUSCular Q6H PRN    methadone (DOLOPHINE) tablet 15 mg  15 mg Oral DAILY    cloNIDine HCl (CATAPRES) tablet 0.1 mg  0.1 mg Oral Q4H PRN      SCHEDULED MEDICATIONS  Current Facility-Administered Medications   Medication Dose Route Frequency    methadone (DOLOPHINE) tablet 15 mg  15 mg Oral DAILY                ASSESSMENT & PLAN        The patient, Yolanda Kussmaul, is a 23 y.o.  female who presents at this time for treatment of the following diagnoses:  Patient Active Hospital Problem List:   Substance induced mood disorder ([facility].) (10/26/2017)    Assessment: sig drug use- depressed mood with vague SI    Plan: detox protocol, rehab- need period of sobriety to assess need for AD- pt states she was taking Adderall and AD but  reviewed by pharmacist suggest no med refill for >1 year. Polysubstance dependence ([facility].) (10/27/2017)    Assessment: heroin use- increased over the ast few month- daily, having mild w/d - tend to minimize and vague about use of other drugs- cocaine, THC and Bianca.  No rehab    Plan: detox with Methadone- received dose today, cows, rehab recommended           A coordinated, multidisplinary treatment team (includes the nurse, unit pharmcist,  and writer) round was conducted for this initial evaluation with the patient present. The following regarding medications was addressed during rounds with patient:   the risks and benefits of the proposed medication. The patient was given the opportunity to ask questions. Informed consent given to the use of the above medications. I will continue to adjust psychiatric and non-psychiatric medications (see above \"medication\" section and orders section for details) as deemed appropriate & based upon diagnoses and response to treatment. I have reviewed admission (and previous/old) labs and medical tests in the EHR and or transferring hospital documents. I will continue to order blood tests/labs and diagnostic tests as deemed appropriate and review results as they become available (see orders for details). I have reviewed old psychiatric and medical records available in the EHR. I Will order additional psychiatric records from other institutions to further elucidate the nature of patient's psychopathology and review once available. I will gather additional collateral information from friends, family and o/p treatment team to further elucidate the nature of patient's psychopathology and baselline level of psychiatric functioning.       ESTIMATED LENGTH OF STAY:    3 days       STRENGTHS:  Exercising self-direction/Resourceful and Awareness of Substance abuse issues                                        SIGNED:    Britt Francois MD  10/27/2017

## 2017-10-27 NOTE — BH NOTES
Pt slept 7 hours. Pt had uneventful night and required no PRN's. She took her scheduled methadone at 2230. Pt had no complaints or signs of distress throughout night. Respirations were unlabored. Continuing to monitor with q15 min rounds for safety.

## 2017-10-27 NOTE — ED NOTES
SBAR report called to [facility]. Brit Gurrola, RN receiving. Pt to be transported to [facility] via wheelchair by Alex.

## 2017-10-27 NOTE — BH NOTES
GROUP THERAPY PROGRESS NOTE    The patient Kat matias 23 y.o. female is participating in Creative Expression Group. Group time: 1 hour    Personal goal for participation: To concentrate on selected task    Goal orientation: social    Group therapy participation: active    Therapeutic interventions reviewed and discussed: Crafts, games, music    Impression of participation: The patient was attentive.     Khoi Man  10/27/2017  5:43 PM

## 2017-10-27 NOTE — BH NOTES
This 23year old female admitted under the services Dr. Guzman Figures with SI/HI. Yamilet Riser UDS + for opiates,heroin,THC, Cocaine. Culture sent on urine and patient is  complaining of abdominal cramping. Tattoo's noted on both sides of neck, chest,both wrists and forearms,and left knuckle. Skin assessment intact. NKA per patient. Dr. Presley Burciaga called for orders, patient wants to detox. Resting in bed, code and oriented to unit. Staff will continue safety checks Q 15 minutes.

## 2017-10-27 NOTE — PROGRESS NOTES
[facility] Ad[facility] Medication Reconciliation     Recommendations/Findings:   1) Removed albuterol nebulizer solution, methylprednisolone, ketorolac    Total Time Spent: 5 minutes    Information obtained from: Patient    Patient allergies: Allergies as of 10/26/2017    (No Known Allergies)       Prior to Admission Medications   Prescriptions Last Dose Informant Patient Reported? Taking? albuterol (PROAIR HFA) 90 mcg/actuation inhaler 9/26/2017 at Unknown time  No Yes   Sig: Take 2 Puffs by inhalation every six (6) hours as needed for Wheezing.       Facility-Administered Medications: None       Thank you,  JB Retana, [facility]  [phone]

## 2017-10-27 NOTE — BH NOTES
PSYCHOSOCIAL ASSESSMENT  :Patient identifying info:  Ana Paula Miranda is a 23 y.o., female admitted 10/26/2017  6:24 PM     Presenting problem and precipitating factors:   Pt is an [place] American female here on a voluntary status due to suicidial ideations. Pt reported that she lost her insurance and she was unable to her medications. Pt reported she has been using drugs to help her feel better. Mental status assessment: Pt was alert and oriented. Pt currently denies SI/HI. Pt is free of delusions. Pt's mood was depressed, affect was flat. Pt's thought process was logical. Pt's insight and judgment was poor. Pt's memory and concentration was fair. Pt's reliability was fair. Current psychiatric providers and contact info: Pt recently opened her case at [facility], she hasn't been assigned a      Previous psychiatric services/providers and response to treatment: Pt has been hospitalized at [facility] years ago and a few months ago at [place] all were due to overdose on pills. Pt was seen by a provider a few years ago and she was prescribed Adderall     Family history of mental illness : Pt reported her brother's suffer from depression     Substance abuse history:    Social History   Substance Use Topics    Smoking status: Current Every Day Smoker     Packs/day: 1.00    Smokeless tobacco: Never Used    Alcohol use Yes   Pt was positive for opiates, THC and cocaine. Pt reported she uses heroin on a daily basis , Bianca's 2 times a week    Family constellation:     Is significant other involved?  Pt's girlfriend is willing to assist with any outpatient appointments       Describe support system: Pt reported that her girlfriend and sister are supportive     Describe living arrangements and home environment: Pt lived with her sister's family and her girlfriend   Health issues: None reported   Hospital Problems  Date Reviewed: 6/28/2013          Codes Class Noted POA    Polysubstance dependence ([facility] ICD-10-CM: F19.20  ICD-9-CM: 304.80  10/27/2017 Yes        * (Principal)Substance induced mood disorder ([facility] ICD-10-CM: F19.94  ICD-9-CM: 292.84  10/26/2017 Unknown              Trauma history: None reported     Legal issues: No current legal issues     History of  service: No     Financial status: Pt is finically  supported by her family since she recently lost her disability     Mormon/cultural factors: None reported     Education/work history: Pt graduated from high school     Have you been licensed as a madhuri care professional (current or ): No     Leisure and recreation preferences: Pt enjoys watching her sister's kids     Describe coping skills: Pt's coping skills are limited     American e|tab, LCSW  10/27/2017

## 2017-10-27 NOTE — BH NOTES
GROUP THERAPY PROGRESS NOTE    Whitney Jones is participating in Process Group. Group time: 30 minutes    Personal goal for participation: Emotions identification/self reflection/self esteem    Goal orientation: personal    Group therapy participation: active    Therapeutic interventions reviewed and discussed: Emotions Chart & \"five Things I like about Myself\"    Impression of participation: Patient participated and shared appropriately during group. However, patient only responded when prompted. Patient shared she felt bored and confident. Patient shared with the group that she likes her random acts of kindness (e.g. Giving $20 to the homeless). Patient also shared that she likes her hair and her sobriety.

## 2017-10-27 NOTE — CONSULTS
Medical Consult for [facility] Patient    Consult H&P   dictated, see patient chart    Impression:    Gutierrez Oh a 23 y.o. female with past medical history of asthma and polysubstance abuse presents with behavioral health problems of suicidal/homicidal ideation admitted for further psychiatric evaluation and treatment. Plan:   1. Psychiatry to manage mental health issues. 2. psychaitry to manage substance abuse withdrawal symptoms. 3. Medically stable at this time, will follow up as needed. 4. No VTE prophylaxis indicated or necessary at this time.      Thank you  Rachel Reese MD  10/27/2017, 12:22 PM

## 2017-10-27 NOTE — BH NOTES
GROUP THERAPY PROGRESS NOTE    Leeann Haddad is participating in [place].      Group time: 30 minutes    Personal goal for participation: reality orientation    Goal orientation: social    Group therapy participation: minimal    Therapeutic interventions reviewed and discussed: yes    Impression of participation: no problem

## 2017-10-28 PROCEDURE — 74011250637 HC RX REV CODE- 250/637: Performed by: INTERNAL MEDICINE

## 2017-10-28 PROCEDURE — 65220000003 HC RM SEMIPRIVATE PSYCH

## 2017-10-28 PROCEDURE — 74011250637 HC RX REV CODE- 250/637: Performed by: PSYCHIATRY & NEUROLOGY

## 2017-10-28 RX ORDER — MAGNESIUM CITRATE
296 SOLUTION, ORAL ORAL
Status: COMPLETED | OUTPATIENT
Start: 2017-10-28 | End: 2017-10-28

## 2017-10-28 RX ADMIN — METHADONE HYDROCHLORIDE 15 MG: 10 TABLET ORAL at 08:49

## 2017-10-28 RX ADMIN — MAGNESIUM HYDROXIDE 30 ML: 400 SUSPENSION ORAL at 16:07

## 2017-10-28 RX ADMIN — ZOLPIDEM TARTRATE 10 MG: 10 TABLET, FILM COATED ORAL at 21:20

## 2017-10-28 RX ADMIN — MAGESIUM CITRATE 296 ML: 1.75 LIQUID ORAL at 20:23

## 2017-10-28 NOTE — BH NOTES
Patient slept for a total of 7 hours this shift. No complaints voiced and no acute distress noted. Q 15 minute checks maintained for safety.  Will continue to monitor patient and reassess patients status as necessary

## 2017-10-28 NOTE — PROGRESS NOTES
Problem: Depressed Mood (Adult/Pediatric)  Goal: *STG: Verbalizes anger, guilt, and other feelings in a constructive manor  Outcome: Progressing Towards Goal  Patient remains alert and oriented x4. She had visitors earlier and visit appeared to go well. She denies any present suicidal ideations or plans of such. She has been visible on the unit but stays mostly to herself or only interacts with select peers. No complaints voiced and no acute distress noted. Encouraged client to vent feelings and emotions to staff as necessary. Q 15 minute checks maintained for safety.  Will continue to monitor and reassess status as necessary

## 2017-10-28 NOTE — BH NOTES
GROUP THERAPY PROGRESS NOTE    The patient Whitney Jones is participating in Reflection Group. Group time: 30 minutes    Personal goal for participation:  To reflect on today's occurences    Goal orientation: Reflection    Group therapy participation: Active    Therapeutic interventions reviewed and discussed: Yes    Tony Byrd  10/27/2017

## 2017-10-28 NOTE — BH NOTES
Problem: Altered Thought Process (Adult/Pediatric)  Goal: *STG: Complies with medication therapy  Outcome: Progressing Towards Goal  Pt is more visible in the milieu. Pt's affect is brighter, mood calmer. Pt is meds/meals compliant. Pt was present at the treatment team meeting today. Pt is going through a safe heroin detox presently. Will continue to monitor q 15 for safety, mood and behavior changes.

## 2017-10-28 NOTE — BH NOTES
PSYCHIATRIC PROGRESS NOTE         Patient Name  Ana Paula Miranda   Date of Birth 1990   [facility] [id number]   Medical Record Number  [id number]      Age  23 y.o. PCP PROVIDER UNKNOWN   Admit date:  10/26/2017    Room Number  323/01  @ [facility]   Date of Service  10/28/2017          PSYCHOTHERAPY SESSION NOTE:  Length of psychotherapy session: 15 minutes    Main condition/diagnosis/issues treated during session today, 10/28/2017 : substance abuse, depression, medication    I employed Cognitive Behavioral therapy techniques, Reality-Oriented psychotherapy, as well as supportive psychotherapy in regards to various ongoing psychosocial stressors, including the following: pre-admission and current problems;   housing issues; occupational issues; academic issues; legal issues; medical issues; and stress of hospitalization. Interpersonal relationship issues and psychodynamic conflicts explored. Attempts made to alleviate maladaptive patterns. We, also, worked on issues of denial & effects of substance dependency/use     Overall, patient is  progressing    Treatment Plan Update (reviewed an updated 10/28/2017) : I will modify psychotherapy tx plan by implementing more stress management strategies, building upon cognitive behavioral techniques, increasing coping skills, as well as shoring up psychological defenses). An extended energy and skill set was needed to engage pt in psychotherapy due to some of the following: resistiveness, complexity, negativity, confrontational nature, hostile behaviors, and/or severe abnormalities in thought processes/psychosis resulting in the loss of expressive/receptive language communication skills. E & M PROGRESS NOTE:         HISTORY       CC:  \"better now\"  HISTORY OF PRESENT ILLNESS/INTERVAL HISTORY:  (reviewed/updated 10/28/2017). per initial evaluation: The patient, Ana Paula Miranda, is a 23 y.o.   [address] female with a past psychiatric history significant for polysubstance use, who presents at this time with complaints of (and/or evidence of) the following emotional symptoms: suicidal thoughts/threats. Additional symptomatology include regular heroin abuse, katy use ( +cocaine and THC), mild w/d sx- nausea, cramps, anxiety. pt is preoccupied with her stressor and felt worthless and hopeless. Currently feel safe in the hospital. Denies alessandra or psychosis. The above symptoms have been present for 1 day. These symptoms are of moderate severity. These symptoms are intermittent/ fleeting in nature. The patient's condition has been precipitated by and psychosocial stressors (financial- lost disability, drug use ). Patient's condition made worse by continued illicit drug use and alcohol use as well as treatment noncompliance. UDS: +MJ, cocaine and opiate; BAL=0. Per initial screening \"Patient is a 23year old female seen face to face in the ER. She reported she is \"depressed. I'm going crazy. \"  She endorsed suicidal ideation and stated her plan is to just keep taking pills until she dies. She reports a history of prior attempts via overdose. She was tearful and her affect was flat. She reported she has thought about hurting people because \"I just want to go,\" but has no specific plan. She is not eating and is not sleeping. She is abusing a variety drugs, primarily heroin. Besides what she tested positive for she also admitted to using katy. She reported she began when she lost her disability and Medicaid and could no longer get her psychotropic medications. She reported she used to take an antidepressant and Adderal.  She and her girlfriend are staying with her sister's family. Since they do not have a fixed address she did not receive her disability renewal paperwork and it was cut off. She did go to [facility] but was discouraged by the process to get help.   Her girlfriend who brought her in reported that she is concerned about her ability to remain safe. She will assist her with following up at [facility] once she is stabilized. Patient is willing to be admitted voluntarily\"      [address] presents/reports/evidences the following emotional symptoms today, 10/28/2017:substance abuse. The above symptoms have been present for many years. These symptoms are of mild severity. The symptoms are intermittent/ fleeting in nature. Additional symptomatology and features include no w/d sx, denies si/hi/avh, slept with prn Ambien. Affect is brighter. SIDE EFFECTS: (reviewed/updated 10/28/2017)  None reported or admitted to. No noted toxicity with use of    ALLERGIES:(reviewed/updated 10/28/2017)  No Known Allergies   MEDICATIONS PRIOR TO ADMISSION:(reviewed/updated 10/28/2017)  Prescriptions Prior to Admission   Medication Sig    albuterol (PROAIR HFA) 90 mcg/actuation inhaler Take 2 Puffs by inhalation every six (6) hours as needed for Wheezing. PAST MEDICAL HISTORY: Past medical history from the initial psychiatric evaluation has been reviewed (reviewed/updated 10/28/2017) with no additional updates (I asked patient and no additional past medical history provided). Past Medical History:   Diagnosis Date    ADHD (attention deficit hyperactivity disorder)     Asthma    History reviewed. No pertinent surgical history. SOCIAL HISTORY: Social history from the initial psychiatric evaluation has been reviewed (reviewed/updated 10/28/2017) with no additional updates (I asked patient and no additional social history provided). Social History     Social History    Marital status: SINGLE     Spouse name: N/A    Number of children: N/A    Years of education: N/A     Occupational History    Not on file.      Social History Main Topics    Smoking status: Current Every Day Smoker     Packs/day: 1.00    Smokeless tobacco: Never Used    Alcohol use Yes    Drug use: Yes     Special: Marijuana, Other, Heroin Comment: [facility], heroin use in the last few month. no rehab    Sexual activity: Yes     Partners: Female     Other Topics Concern    Not on file     Social History Narrative    ** Merged History Encounter **         ** Merged History Encounter **         Live with her girlfriend and sister. Was on disability but recently lost her disability. No legal problem reported. Started seeing psychiatrist- age 7 behavior problem. Reports recent psych hospitalization at [address] [place] [address] for possible OD/drug abuse    No abuse reported. FAMILY HISTORY: Family history from the initial psychiatric evaluation has been reviewed (reviewed/updated 10/28/2017) with no additional updates (I asked patient and no additional family history provided). Family History   Problem Relation Age of Onset   [facility] Asthma Mother     Hypertension Mother    [address] [facility] Ast[facility] Sister     Hypertension Sister     Asthma Brother     Hypertension Brother     Depression Brother        REVIEW OF SYSTEMS: (reviewed/updated 10/28/2017)  Appetite:improved   Sleep: good   All other Review of Systems: Psychological ROS: positive for - concentration difficulties and sleep disturbances  negative for - disorientation, hallucinations or suicidal ideation  Respiratory ROS: no cough, shortness of breath, or wheezing  Cardiovascular ROS: no chest pain or dyspnea on exertion  Neurological ROS: no TIA or stroke symptoms         [facility] ([facility]):    MSE FINDINGS ARE WITHIN NORMAL LIMITS (WNL) UNLESS OTHERWISE STATED BELOW. ( ALL OF THE BELOW CATEGORIES OF THE [facility] HAVE BEEN REVIEWED (reviewed 10/28/2017) AND UPDATED AS DEEMED APPROPRIATE )  General Presentation age appropriate and casually dressed, cooperative   Orientation oriented to time, place and person   Vital Signs  See below (reviewed 10/28/2017); Vital Signs (BP, Pulse, & Temp) are within normal limits if not listed below.    Gait and Station Stable/steady, no ataxia Musculoskeletal System No extrapyramidal symptoms (EPS); no abnormal muscular movements or Tardive Dyskinesia (TD); muscle strength and tone are within normal limits   Language No aphasia or dysarthria   Speech:  normal volume   Thought Processes logical; normal rate of thoughts; fair abstract reasoning/computation   Thought Associations goal directed   Thought Content free of delusions, free of hallucinations and preoccupations   Suicidal Ideations none   Homicidal Ideations none   Mood:  euthymic   Affect:  anxious and euthymic   Memory recent  intact   Memory remote:  intact   Concentration/Attention:  intact   Fund of Knowledge average   Insight:  fair   Reliability fair   Judgment:  limited          VITALS:     Patient Vitals for the past 24 hrs:   Temp Pulse Resp BP   10/28/17 0709 97.7 °F (36.5 °C) 70 18 95/65     Wt Readings from Last 3 Encounters:   10/26/17 56.7 kg (125 lb)   09/26/17 54.9 kg (121 lb)   08/26/16 59 kg (130 lb)     Temp Readings from Last 3 Encounters:   10/28/17 97.7 °F (36.5 °C)   09/26/17 98.2 °F (36.8 °C)   04/15/17 97.3 °F (36.3 °C)     BP Readings from Last 3 Encounters:   10/28/17 95/65   09/26/17 109/66   04/15/17 119/54     Pulse Readings from Last 3 Encounters:   10/28/17 70   09/26/17 96   04/15/17 (!) 126            DATA     LABORATORY DATA:(reviewed/updated 10/28/2017)  No results found for this or any previous visit (from the past 24 hour(s)). No results found for: VALF2, VALAC, VALP, VALPR, DS6, CRBAM, CRBAMP, CARB2, XCRBAM  No results found for: LITHM   RADIOLOGY REPORTS:(reviewed/updated 10/28/2017)  No results found.        MEDICATIONS     ALL MEDICATIONS:   Current Facility-Administered Medications   Medication Dose Route Frequency    albuterol (PROVENTIL HFA, VENTOLIN HFA, PROAIR HFA) inhaler 2 Puff  2 Puff Inhalation Q4H PRN    ziprasidone (GEODON) 20 mg in sterile water (preservative free) 1 mL injection  20 mg IntraMUSCular BID PRN    OLANZapine (ZyPREXA) tablet 5 mg  5 mg Oral Q6H PRN    benztropine (COGENTIN) tablet 2 mg  2 mg Oral BID PRN    benztropine (COGENTIN) injection 2 mg  2 mg IntraMUSCular BID PRN    LORazepam (ATIVAN) injection 2 mg  2 mg IntraMUSCular Q4H PRN    LORazepam (ATIVAN) tablet 1 mg  1 mg Oral Q4H PRN    zolpidem (AMBIEN) tablet 10 mg  10 mg Oral QHS PRN    acetaminophen (TYLENOL) tablet 650 mg  650 mg Oral Q4H PRN    ibuprofen (MOTRIN) tablet 400 mg  400 mg Oral Q8H PRN    magnesium hydroxide (MILK OF MAGNESIA) 400 mg/5 mL oral suspension 30 mL  30 mL Oral DAILY PRN    nicotine (NICODERM CQ) 21 mg/24 hr patch 1 Patch  1 Patch TransDERmal DAILY PRN    promethazine (PHENERGAN) tablet 25 mg  25 mg Oral Q6H PRN    loperamide (IMODIUM) capsule 2 mg  2 mg Oral PRN    dicyclomine (BENTYL) 10 mg/mL injection 20 mg  20 mg IntraMUSCular Q6H PRN    cloNIDine HCl (CATAPRES) tablet 0.1 mg  0.1 mg Oral Q4H PRN      SCHEDULED MEDICATIONS:   Current Facility-Administered Medications   Medication Dose Route Frequency          ASSESSMENT & PLAN     DIAGNOSES REQUIRING ACTIVE TREATMENT AND MONITORING: (reviewed/updated 10/28/2017)  Patient Active Hospital Problem List:  Substance induced mood disorder ([facility] (10/26/2017)    Assessment: sig drug use- depressed mood with vague SI- slow progress- affect is brighter, denies si/hi/avh. Plan: detox protocol, Completed Methadone detox. rehab- need period of sobriety to assess need for AD. Polysubstance dependence ([facility] (10/27/2017)    Assessment: heroin use- increased over the ast few month- daily, having mild w/d - tend to minimize and vague about use of other drugs- cocaine, THC and Bianca. No rehab    Plan: detox with Methadone- completed cows, rehab recommended         In summary, Damaris Moser, is a 23 y.o.  female who presents with a severe exacerbation of the principal diagnosis of Substance induced mood disorder ([facility]  Patient's condition is improving.   Patient requires continued inpatient hospitalization for further stabilization, safety monitoring and medication management. I will continue to coordinate the provision of individual, milieu, occupational, group, and substance abuse therapies to address target symptoms/diagnoses as deemed appropriate for the individual patient. A coordinated, multidisplinary treatment team round was conducted with the patient (this team consists of the nurse, psychiatric unit pharmcist,  and writer). Complete current electronic health record for patient has been reviewed today including consultant notes, ancillary staff notes, nurses and psychiatric tech notes. Suicide risk assessment completed and patient deemed to be of low risk for suicide at this time. The following regarding medications was addressed during rounds with patient:   the risks and benefits of the proposed medication. The patient was given the opportunity to ask questions. Informed consent given to the use of the above medications. Will continue to adjust psychiatric and non-psychiatric medications (see above \"medication\" section and orders section for details) as deemed appropriate & based upon diagnoses and response to treatment. I will continue to order blood tests/labs and diagnostic tests as deemed appropriate and review results as they become available (see orders for details and above listed lab/test results). I will order psychiatric records from previous [facility] hospitals to further elucidate the nature of patient's psychopathology and review once available. I will gather additional collateral information from friends, family and o/p treatment team to further elucidate the nature of patient's psychopathology and baselline level of psychiatric functioning.          I certify that this patient's inpatient psychiatric hospital services furnished since the previous certification were, and continue to be, required for treatment that could reasonably be expected to improve the patient's condition, or for diagnostic study, and that the patient continues to need, on a daily basis, active treatment furnished directly by or requiring the supervision of inpatient psychiatric facility personnel. In addition, the hospital records show that services furnished were intensive treatment services, admission or related services, or equivalent services. EXPECTED DISCHARGE DATE/DAY:  Monday     DISPOSITION: Home/Rehab       Signed By:   Saloni Díaz MD  10/28/2017

## 2017-10-28 NOTE — BH NOTES
The patient Jose Hagen is participating in Relaxation Group. Group time: 30 minutes    Personal goal for participation: personal  Goal orientation:  To learn to relax    Group therapy participation: Active    Therapeutic interventions reviewed and discussed: Soha Ewing  10/27/2017

## 2017-10-29 PROCEDURE — 74011250637 HC RX REV CODE- 250/637: Performed by: PSYCHIATRY & NEUROLOGY

## 2017-10-29 PROCEDURE — 65220000003 HC RM SEMIPRIVATE PSYCH

## 2017-10-29 RX ADMIN — ZOLPIDEM TARTRATE 10 MG: 10 TABLET, FILM COATED ORAL at 21:22

## 2017-10-29 NOTE — BH NOTES
Patient slept 6hrs during the night, presently in the bathroom. States she has had 3 BM's during the night. Magnesium citrate effective. States stools are watery at this time, reminded patient the medicine is still working and may continue to do so throughout the day. No concerns voiced by patient, states understanding. Continue 15min checks.

## 2017-10-29 NOTE — BH NOTES
Problem: Altered Thought Process (Adult/Pediatric)  Goal: *STG: Complies with medication therapy  Outcome: Progressing Towards Goal  Pt is more visible in the milieu. Pt's affect is brighter, mood calmer. Pt is meds/meals compliant. Pt was present at the treatment team meeting today. Pt is going through a safe heroin detox presently. Pt has no new complaints this shift. Will continue to monitor q 15 for safety, mood and behavior changes.

## 2017-10-29 NOTE — PROGRESS NOTES
General Daily Progress Note    Admit Date: 10/26/2017    Subjective:     Patient is complaining of severe constipation. Current Facility-Administered Medications   Medication Dose Route Frequency    albuterol (PROVENTIL HFA, VENTOLIN HFA, PROAIR HFA) inhaler 2 Puff  2 Puff Inhalation Q4H PRN    ziprasidone (GEODON) 20 mg in sterile water (preservative free) 1 mL injection  20 mg IntraMUSCular BID PRN    OLANZapine (ZyPREXA) tablet 5 mg  5 mg Oral Q6H PRN    benztropine (COGENTIN) tablet 2 mg  2 mg Oral BID PRN    benztropine (COGENTIN) injection 2 mg  2 mg IntraMUSCular BID PRN    LORazepam (ATIVAN) injection 2 mg  2 mg IntraMUSCular Q4H PRN    LORazepam (ATIVAN) tablet 1 mg  1 mg Oral Q4H PRN    zolpidem (AMBIEN) tablet 10 mg  10 mg Oral QHS PRN    acetaminophen (TYLENOL) tablet 650 mg  650 mg Oral Q4H PRN    ibuprofen (MOTRIN) tablet 400 mg  400 mg Oral Q8H PRN    magnesium hydroxide (MILK OF MAGNESIA) 400 mg/5 mL oral suspension 30 mL  30 mL Oral DAILY PRN    nicotine (NICODERM CQ) 21 mg/24 hr patch 1 Patch  1 Patch TransDERmal DAILY PRN    promethazine (PHENERGAN) tablet 25 mg  25 mg Oral Q6H PRN    loperamide (IMODIUM) capsule 2 mg  2 mg Oral PRN    dicyclomine (BENTYL) 10 mg/mL injection 20 mg  20 mg IntraMUSCular Q6H PRN    cloNIDine HCl (CATAPRES) tablet 0.1 mg  0.1 mg Oral Q4H PRN            Objective:     No data found. Physical Exam:   Visit Vitals    BP 95/65 (BP 1 Location: Left arm, BP Patient Position: At rest)    Pulse 70    Temp 97.7 °F (36.5 °C)    Resp 18    Ht 5' 6\" (1.676 m)    Wt 56.7 kg (125 lb)    SpO2 99%    Breastfeeding No    BMI 20.18 kg/m2     General:  Alert, cooperative, no distress, appears stated age. Head:  Normocephalic, without obvious abnormality, atraumatic. Eyes:  Conjunctivae/corneas clear. PERRL, EOMs intact. Lungs:   Clear to auscultation bilaterally. Chest wall:  No tenderness or deformity.    Heart:  Regular rate and rhythm, S1, S2 normal, no murmur, click, rub or gallop. Abdomen:   Soft, non-tender. Bowel sounds normal. No masses,  No organomegaly. Extremities: Extremities normal, atraumatic, no cyanosis or edema. Pulses: 2+ and symmetric all extremities. Skin: Skin color, texture, turgor normal. No rashes or lesions             No results found for this or any previous visit (from the past 24 hour(s)). Assessment:     Principal Problem:    Substance induced mood disorder ([facility] (10/26/2017)    Active Problems:    Polysubstance dependence ([facility] (10/27/2017)    severe constipation    Plan:     Magnesium citrate 10 oz po today.

## 2017-10-29 NOTE — BH NOTES
PSYCHIATRIC PROGRESS NOTE         Patient Name  Van Epley   Date of Birth 1990   CSN [id number]   Medical Record Number  [id number]      Age  23 y.o. PCP PROVIDER UNKNOWN   Admit date:  10/26/2017    Room Number  323/01  @ [facility]   Date of Service  10/29/2017          PSYCHOTHERAPY SESSION NOTE:  Length of psychotherapy session: 15 minutes    Main condition/diagnosis/issues treated during session today, 10/29/2017 : substance abuse, depression, medication    I employed Cognitive Behavioral therapy techniques, Reality-Oriented psychotherapy, as well as supportive psychotherapy in regards to various ongoing psychosocial stressors, including the following: pre-admission and current problems;   housing issues; occupational issues; academic issues; legal issues; medical issues; and stress of hospitalization. Interpersonal relationship issues and psychodynamic conflicts explored. Attempts made to alleviate maladaptive patterns. We, also, worked on issues of denial & effects of substance dependency/use     Overall, patient is  progressing    Treatment Plan Update (reviewed an updated 10/29/2017) : I will modify psychotherapy tx plan by implementing more stress management strategies, building upon cognitive behavioral techniques, increasing coping skills, as well as shoring up psychological defenses). An extended energy and skill set was needed to engage pt in psychotherapy due to some of the following: resistiveness, complexity, negativity, confrontational nature, hostile behaviors, and/or severe abnormalities in thought processes/psychosis resulting in the loss of expressive/receptive language communication skills. E & M PROGRESS NOTE:         HISTORY       CC:  \"better now\"  HISTORY OF PRESENT ILLNESS/INTERVAL HISTORY:  (reviewed/updated 10/29/2017). per initial evaluation: The patient, Van Epley, is a 23 y.o.   [address] female with a past psychiatric history significant for polysubstance use, who presents at this time with complaints of (and/or evidence of) the following emotional symptoms: suicidal thoughts/threats. Additional symptomatology include regular heroin abuse, katy use ( +cocaine and THC), mild w/d sx- nausea, cramps, anxiety. pt is preoccupied with her stressor and felt worthless and hopeless. Currently feel safe in the hospital. Denies alessandra or psychosis. The above symptoms have been present for 1 day. These symptoms are of moderate severity. These symptoms are intermittent/ fleeting in nature. The patient's condition has been precipitated by and psychosocial stressors (financial- lost disability, drug use ). Patient's condition made worse by continued illicit drug use and alcohol use as well as treatment noncompliance. UDS: +MJ, cocaine and opiate; BAL=0. Per initial screening \"Patient is a 23year old female seen face to face in the ER. She reported she is \"depressed. I'm going crazy. \"  She endorsed suicidal ideation and stated her plan is to just keep taking pills until she dies. She reports a history of prior attempts via overdose. She was tearful and her affect was flat. She reported she has thought about hurting people because \"I just want to go,\" but has no specific plan. She is not eating and is not sleeping. She is abusing a variety drugs, primarily heroin. Besides what she tested positive for she also admitted to using katy. She reported she began when she lost her disability and Medicaid and could no longer get her psychotropic medications. She reported she used to take an antidepressant and Adderal.  She and her girlfriend are staying with her sister's family. Since they do not have a fixed address she did not receive her disability renewal paperwork and it was cut off. She did go to [facility] but was discouraged by the process to get help.   Her girlfriend who brought her in reported that she is concerned about her ability to remain safe. She will assist her with following up at [facility] once she is stabilized. Patient is willing to be admitted voluntarily\"      [address] presents/reports/evidences the following emotional symptoms today, 10/29/2017:substance abuse. The above symptoms have been present for many years. These symptoms are of mild severity. The symptoms are intermittent/ fleeting in nature. Additional symptomatology and features include no w/d sx, denies si/hi/avh, mood is stable, no alessandra or psychosis, constipation tx with med. Affect is brighter. SIDE EFFECTS: (reviewed/updated 10/29/2017)  None reported or admitted to. No noted toxicity with use of med   ALLERGIES:(reviewed/updated 10/29/2017)  No Known Allergies   MEDICATIONS PRIOR TO ADMISSION:(reviewed/updated 10/29/2017)  Prescriptions Prior to Admission   Medication Sig    albuterol (PROAIR HFA) 90 mcg/actuation inhaler Take 2 Puffs by inhalation every six (6) hours as needed for Wheezing. PAST MEDICAL HISTORY: Past medical history from the initial psychiatric evaluation has been reviewed (reviewed/updated 10/29/2017) with no additional updates (I asked patient and no additional past medical history provided). Past Medical History:   Diagnosis Date    ADHD (attention deficit hyperactivity disorder)     Asthma    History reviewed. No pertinent surgical history. SOCIAL HISTORY: Social history from the initial psychiatric evaluation has been reviewed (reviewed/updated 10/29/2017) with no additional updates (I asked patient and no additional social history provided). Social History     Social History    Marital status: SINGLE     Spouse name: N/A    Number of children: N/A    Years of education: N/A     Occupational History    Not on file. Social History Main Topics    Smoking status: Current Every Day Smoker     Packs/day: 1.00    Smokeless tobacco: Never Used    Alcohol use Yes    Drug use:  Yes Special: Marijuana, Other, Heroin      Comment: Bianca, heroin use in the last few month. no rehab    Sexual activity: Yes     Partners: Female     Other Topics Concern    Not on file     Social History Narrative    ** Merged History Encounter **         ** Merged History Encounter **         Live with her girlfriend and sister. Was on disability but recently lost her disability. No legal problem reported. Started seeing psychiatrist- age 7 behavior problem. Reports recent psych hospitalization at [address] [place] [address] for possible OD/drug abuse    No abuse reported. FAMILY HISTORY: Family history from the initial psychiatric evaluation has been reviewed (reviewed/updated 10/29/2017) with no additional updates (I asked patient and no additional family history provided). Family History   Problem Relation Age of Onset   [facility] Asthma Mother     Hypertension Mother    [address] [facility] Asthma Sister     Hypertension Sister     Asthma Brother     Hypertension Brother     Depression Brother        REVIEW OF SYSTEMS: (reviewed/updated 10/29/2017)  Appetite:improved   Sleep: good   All other Review of Systems: Psychological ROS: positive for - concentration difficulties and sleep disturbances  negative for - disorientation, hallucinations or suicidal ideation  Respiratory ROS: no cough, shortness of breath, or wheezing  Cardiovascular ROS: no chest pain or dyspnea on exertion  Neurological ROS: no TIA or stroke symptoms         [facility] ([facility]    [facility] FINDINGS ARE WITHIN NORMAL LIMITS (WNL) UNLESS OTHERWISE STATED BELOW. ( ALL OF THE BELOW CATEGORIES OF THE [facility] HAVE BEEN REVIEWED (reviewed 10/29/2017) AND UPDATED AS DEEMED APPROPRIATE )  General Presentation age appropriate and casually dressed, cooperative   Orientation oriented to time, place and person   Vital Signs  See below (reviewed 10/29/2017); Vital Signs (BP, Pulse, & Temp) are within normal limits if not listed below. Gait and Station Stable/steady, no ataxia   Musculoskeletal System No extrapyramidal symptoms (EPS); no abnormal muscular movements or Tardive Dyskinesia (TD); muscle strength and tone are within normal limits   Language No aphasia or dysarthria   Speech:  normal volume   Thought Processes logical; normal rate of thoughts; fair abstract reasoning/computation   Thought Associations goal directed   Thought Content free of delusions, free of hallucinations and preoccupations   Suicidal Ideations none   Homicidal Ideations none   Mood:  euthymic   Affect:  anxious and euthymic   Memory recent  intact   Memory remote:  intact   Concentration/Attention:  intact   Fund of Knowledge average   Insight:  fair   Reliability fair   Judgment:  limited          VITALS:     Patient Vitals for the past 24 hrs:   Pulse Resp BP   10/29/17 0559 77 16 108/65     Wt Readings from Last 3 Encounters:   10/26/17 56.7 kg (125 lb)   09/26/17 54.9 kg (121 lb)   08/26/16 59 kg (130 lb)     Temp Readings from Last 3 Encounters:   10/28/17 97.7 °F (36.5 °C)   09/26/17 98.2 °F (36.8 °C)   04/15/17 97.3 °F (36.3 °C)     BP Readings from Last 3 Encounters:   10/29/17 108/65   09/26/17 109/66   04/15/17 119/54     Pulse Readings from Last 3 Encounters:   10/29/17 77   09/26/17 96   04/15/17 (!) 126            DATA     LABORATORY DATA:(reviewed/updated 10/29/2017)  No results found for this or any previous visit (from the past 24 hour(s)). No results found for: VALF2, VALAC, VALP, VALPR, DS6, CRBAM, CRBAMP, CARB2, XCRBAM  No results found for: LITHM   RADIOLOGY REPORTS:(reviewed/updated 10/29/2017)  No results found.        MEDICATIONS     ALL MEDICATIONS:   Current Facility-Administered Medications   Medication Dose Route Frequency    albuterol (PROVENTIL HFA, VENTOLIN HFA, PROAIR HFA) inhaler 2 Puff  2 Puff Inhalation Q4H PRN    ziprasidone (GEODON) 20 mg in sterile water (preservative free) 1 mL injection  20 mg IntraMUSCular BID PRN    OLANZapine (ZyPREXA) tablet 5 mg  5 mg Oral Q6H PRN    benztropine (COGENTIN) tablet 2 mg  2 mg Oral BID PRN    benztropine (COGENTIN) injection 2 mg  2 mg IntraMUSCular BID PRN    LORazepam (ATIVAN) injection 2 mg  2 mg IntraMUSCular Q4H PRN    LORazepam (ATIVAN) tablet 1 mg  1 mg Oral Q4H PRN    zolpidem (AMBIEN) tablet 10 mg  10 mg Oral QHS PRN    acetaminophen (TYLENOL) tablet 650 mg  650 mg Oral Q4H PRN    ibuprofen (MOTRIN) tablet 400 mg  400 mg Oral Q8H PRN    magnesium hydroxide (MILK OF MAGNESIA) 400 mg/5 mL oral suspension 30 mL  30 mL Oral DAILY PRN    nicotine (NICODERM CQ) 21 mg/24 hr patch 1 Patch  1 Patch TransDERmal DAILY PRN    promethazine (PHENERGAN) tablet 25 mg  25 mg Oral Q6H PRN    loperamide (IMODIUM) capsule 2 mg  2 mg Oral PRN    dicyclomine (BENTYL) 10 mg/mL injection 20 mg  20 mg IntraMUSCular Q6H PRN    cloNIDine HCl (CATAPRES) tablet 0.1 mg  0.1 mg Oral Q4H PRN      SCHEDULED MEDICATIONS:   Current Facility-Administered Medications   Medication Dose Route Frequency          ASSESSMENT & PLAN     DIAGNOSES REQUIRING ACTIVE TREATMENT AND MONITORING: (reviewed/updated 10/29/2017)  Patient Active Hospital Problem List:  Substance induced mood disorder ([facility] (10/26/2017)    Assessment: sig drug use- depressed mood with vague SI- slow progress- affect is brighter, denies si/hi/avh. Plan: detox protocol, Completed Methadone detox. rehab- need period of sobriety to assess need for AD. Polysubstance dependence ([facility] (10/27/2017)    Assessment: no w/d sx- detox completed. Plan: detox with Methadone- completed. cows, rehab recommended- pt is interested         In summary, Matilde Nelson, is a 23 y.o.  female who presents with a severe exacerbation of the principal diagnosis of Substance induced mood disorder ([facility]  Patient's condition is improving.   Patient requires continued inpatient hospitalization for further stabilization, safety monitoring and medication management. I will continue to coordinate the provision of individual, milieu, occupational, group, and substance abuse therapies to address target symptoms/diagnoses as deemed appropriate for the individual patient. A coordinated, multidisplinary treatment team round was conducted with the patient (this team consists of the nurse, psychiatric unit pharmcist,  and writer). Complete current electronic health record for patient has been reviewed today including consultant notes, ancillary staff notes, nurses and psychiatric tech notes. Suicide risk assessment completed and patient deemed to be of low risk for suicide at this time. The following regarding medications was addressed during rounds with patient:   the risks and benefits of the proposed medication. The patient was given the opportunity to ask questions. Informed consent given to the use of the above medications. Will continue to adjust psychiatric and non-psychiatric medications (see above \"medication\" section and orders section for details) as deemed appropriate & based upon diagnoses and response to treatment. I will continue to order blood tests/labs and diagnostic tests as deemed appropriate and review results as they become available (see orders for details and above listed lab/test results). I will order psychiatric records from previous [facility] hospitals to further elucidate the nature of patient's psychopathology and review once available. I will gather additional collateral information from friends, family and o/p treatment team to further elucidate the nature of patient's psychopathology and baselline level of psychiatric functioning.          I certify that this patient's inpatient psychiatric hospital services furnished since the previous certification were, and continue to be, required for treatment that could reasonably be expected to improve the patient's condition, or for diagnostic study, and that the patient continues to need, on a daily basis, active treatment furnished directly by or requiring the supervision of inpatient psychiatric facility personnel. In addition, the hospital records show that services furnished were intensive treatment services, admission or related services, or equivalent services. EXPECTED DISCHARGE DATE/DAY:  Monday     DISPOSITION: Home/Rehab       Signed By:   Mauro Pierre MD  10/29/2017

## 2017-10-29 NOTE — BH NOTES
Patient given magnesium citrate 296 ml as ordered. Presently in dayroom with visitors. Mood pleasant, no complaints voiced to this nurse. Denies wanting to hurt herself or others. Denies hallucinations and answers questions appropriately. Will continue to monitor for effectiveness of medication, maintain safety and remain supportive.  Continue 15min checks,

## 2017-10-30 VITALS
WEIGHT: 125 LBS | HEIGHT: 66 IN | TEMPERATURE: 97.7 F | RESPIRATION RATE: 18 BRPM | SYSTOLIC BLOOD PRESSURE: 118 MMHG | DIASTOLIC BLOOD PRESSURE: 82 MMHG | OXYGEN SATURATION: 99 % | BODY MASS INDEX: 20.09 KG/M2 | HEART RATE: 79 BPM

## 2017-10-30 PROCEDURE — 74011250637 HC RX REV CODE- 250/637: Performed by: PSYCHIATRY & NEUROLOGY

## 2017-10-30 RX ADMIN — IBUPROFEN 400 MG: 400 TABLET, FILM COATED ORAL at 06:19

## 2017-10-30 NOTE — DISCHARGE SUMMARY
PSYCHIATRIC DISCHARGE SUMMARY         IDENTIFICATION:    Patient Name  Kat Kwon   Date of Birth 1990   [facility] [id number]   Medical Record Number  [id number]      Age  23 y.o. PCP PROVIDER UNKNOWN   Admit date:  10/26/2017    Discharge date: 10/30/2017   Room Number  323/01  @ [address]   Date of Service  10/30/2017            TYPE OF DISCHARGE: REGULAR               CONDITION AT DISCHARGE: improved       PROVISIONAL & DISCHARGE DIAGNOSES:    Problem List  Date Reviewed: 6/28/2013          Codes Class    O[facility] ICD-10-CM: F11.20  ICD-9-CM: 304.00         Polysubstance dependence ([facility] ICD-10-CM: F19.20  ICD-9-CM: 304.80         * (Principal)Substance induced mood disorder ([facility] ICD-10-CM: F19.94  ICD-9-CM: 292.84         ADHD (attention deficit hyperactivity disorder) ICD-10-CM: F90.9  ICD-9-CM: 314.01               Active Hospital Problems    Polysubstance dependence ([facility]      *Substance induced mood disorder ([facility]        DISCHARGE DIAGNOSIS:   Axis I:  SEE ABOVE  Axis II: SEE ABOVE  Axis III: SEE ABOVE  Axis IV:  lack of structure  Axis V:  30 on admission, 70 on discharge        CC & HISTORY OF PRESENT ILLNESS:  The patient, Kat Kwon, is a 23 y.o. BLACK OR  female with a past psychiatric history significant for polysubstance use, who presents at this time with complaints of (and/or evidence of) the following emotional symptoms: suicidal thoughts/threats. Additional symptomatology include regular heroin abuse, katy use ( +cocaine and THC), mild w/d sx- nausea, cramps, anxiety. pt is preoccupied with her stressor and felt worthless and hopeless. Currently feel safe in the hospital. Denies alessandra or psychosis. The above symptoms have been present for 1 day. These symptoms are of moderate severity. These symptoms are intermittent/ fleeting in nature.   The patient's condition has been precipitated by and psychosocial stressors (financial- lost disability, drug use ). Patient's condition made worse by continued illicit drug use and alcohol use as well as treatment noncompliance. UDS: +MJ, cocaine and opiate; BAL=0. Per initial screening \"Patient is a 23year old female seen face to face in the ER. She reported she is \"depressed. I'm going crazy. \"  She endorsed suicidal ideation and stated her plan is to just keep taking pills until she dies. She reports a history of prior attempts via overdose. She was tearful and her affect was flat. She reported she has thought about hurting people because \"I just want to go,\" but has no specific plan. She is not eating and is not sleeping. She is abusing a variety drugs, primarily heroin. Besides what she tested positive for she also admitted to using katy. She reported she began when she lost her disability and Medicaid and could no longer get her psychotropic medications. She reported she used to take an antidepressant and Adderal.  She and her girlfriend are staying with her sister's family. Since they do not have a fixed address she did not receive her disability renewal paperwork and it was cut off. She did go to [facility] but was discouraged by the process to get help. Her girlfriend who brought her in reported that she is concerned about her ability to remain safe. She will assist her with following up at [facility] once she is stabilized. Patient is willing to be admitted voluntarily\"       SOCIAL HISTORY:    Social History     Social History    Marital status: SINGLE     Spouse name: N/A    Number of children: N/A    Years of education: N/A     Occupational History    Not on file. Social History Main Topics    Smoking status: Current Every Day Smoker     Packs/day: 1.00    Smokeless tobacco: Never Used    Alcohol use Yes    Drug use: Yes     Special: Marijuana, Other, Heroin      Comment: Katy, heroin use in the last few month.  no rehab    Sexual activity: Yes     Partners: Female Other Topics Concern    Not on file     Social History Narrative    ** Merged History Encounter **         ** Merged History Encounter **         Live with her girlfriend and sister. Was on disability but recently lost her disability. No legal problem reported. Started seeing psychiatrist- age 7 behavior problem. Reports recent psych hospitalization at [address] [place] [address] for possible OD/drug abuse    No abuse reported. FAMILY HISTORY:   Family History   Problem Relation Age of Onset   [address] [facility] Asthma Mother     Hypertension Mother    [address] [facility] Asthma Sister     Hypertension Sister     Asthma Brother     Hypertension Brother     Depression Brother              HOSPITALIZATION COURSE:    Deanna Lawler was admitted to the inpatient psychiatric unit [facility] for acute psychiatric stabilization in regards to symptomatology as described in the HPI above. While on the unit Deanna Lawler was involved in individual, group, occupational and milieu therapy. Psychiatric medications were adjusted during this hospitalization including Methadone detox. Deanna Lawler demonstrated a slow, but progressive improvement in overall condition. Much of patient's depression appeared to be related to situational stressors, effects of drugs of abuse, and psychological factors. Please see individual progress notes for more specific details regarding patient's hospitalization course. At time of discharge, Deanna Lawler is without significant problems of depression psychosis  alessandra. Patient free of suicidal and homicidal ideations (appears to be at very low risk of suicide or homicide) and reports many positive predictive factors in terms of not attempting suicide or homicide. Patient with request for discharge today. There are no grounds to seek a TDO. Patient has maximized benefit to be derived from acute inpatient psychiatric treatment.   All members of the treatment team concur with each other in regards to plans for discharge today per patient's request.  Patient and family are aware and in agreement with discharge and discharge plan. Per my last note:   Substance induced mood disorder ([facility] (10/26/2017)    Assessment: sig drug use- depressed mood with vague SI- slow progress- affect is brighter, denies si/hi/avh. Plan: detox protocol, Completed Methadone detox. rehab- need period of sobriety to assess need for AD. Polysubstance dependence ([facility] (10/27/2017)    Assessment: no w/d sx- detox completed. Plan: detox with Methadone- completed. cows, rehab recommended- pt is interested         LABS AND IMAGAING:    Labs Reviewed   CULTURE, URINE - Abnormal; Notable for the following:        Result Value    Culture result:   (*)     Value: INCLUDING 2,000 COLONIES/mL OF STREPTOCOCCI, BETA HEMOLYTIC GROUP B    All other components within normal limits   DRUG SCREEN, URINE - Abnormal; Notable for the following:     COCAINE POSITIVE (*)     OPIATES POSITIVE (*)     THC (TH-CANNABINOL) POSITIVE (*)     All other components within normal limits   URINALYSIS W/ REFLEX CULTURE - Abnormal; Notable for the following:     Appearance CLOUDY (*)     Epithelial cells MANY (*)     Bacteria 3+ (*)     UA:UC IF INDICATED URINE CULTURE ORDERED (*)     Mucus 3+ (*)     All other components within normal limits   CBC WITH AUTOMATED DIFF - Abnormal; Notable for the following:     WBC 12.1 (*)     PLATELET 089 (*)     ABS.  LYMPHOCYTES 4.6 (*)     All other components within normal limits   METABOLIC PANEL, BASIC - Abnormal; Notable for the following:     Creatinine 1.12 (*)     GFR est non-AA 59 (*)     All other components within normal limits   ETHYL ALCOHOL   HCG URINE, QL. - POC   POC URINE PREGNANCY TEST     No results found for: DS35, PHEN, PHENO, PHENT, DILF, DS39, PHENY, PTN, VALF2, VALAC, VALP, VALPR, DS6, CRBAM, CRBAMP, CARB2, XCRBAM  Admission on 10/26/2017   Component Date Value Ref Range Status    AMPHETAMINES 10/26/2017 NEGATIVE   NEG   Final    BARBITURATES 10/26/2017 NEGATIVE   NEG   Final    BENZODIAZEPINES 10/26/2017 NEGATIVE   NEG   Final    COCAINE 10/26/2017 POSITIVE* NEG   Final    METHADONE 10/26/2017 NEGATIVE   NEG   Final    OPIATES 10/26/2017 POSITIVE* NEG   Final    PCP(PHENCYCLIDINE) 10/26/2017 NEGATIVE   NEG   Final    THC (TH-CANNABINOL) 10/26/2017 POSITIVE* NEG   Final    Drug screen comment 10/26/2017 (NOTE)   Final    Color 10/26/2017 YELLOW/STRAW    Final    Appearance 10/26/2017 CLOUDY* CLEAR   Final    Specific gravity 10/26/2017 1.020  1.003 - 1.030   Final    pH (UA) 10/26/2017 6.0  5.0 - 8.0   Final    Protein 10/26/2017 NEGATIVE   NEG mg/dL Final    Glucose 10/26/2017 NEGATIVE   NEG mg/dL Final    Ketone 10/26/2017 NEGATIVE   NEG mg/dL Final    Bilirubin 10/26/2017 NEGATIVE   NEG   Final    Blood 10/26/2017 NEGATIVE   NEG   Final    Urobilinogen 10/26/2017 0.2  0.2 - 1.0 EU/dL Final    Nitrites 10/26/2017 NEGATIVE   NEG   Final    Leukocyte Esterase 10/26/2017 NEGATIVE   NEG   Final    WBC 10/26/2017 0-4  0 - 4 /hpf Final    RBC 10/26/2017 0-5  0 - 5 /hpf Final    Epithelial cells 10/26/2017 MANY* FEW /lpf Final    Bacteria 10/26/2017 3+* NEG /hpf Final    UA:UC IF INDICATED 10/26/2017 URINE CULTURE ORDERED* CNI   Final    Mucus 10/26/2017 3+* NEG /lpf Final    ALCOHOL(ETHYL),SERUM 10/26/2017 <10  <10 MG/DL Final    WBC 10/26/2017 12.1* 3.6 - 11.0 K/uL Final    RBC 10/26/2017 4.99  3.80 - 5.20 M/uL Final    HGB 10/26/2017 14.1  11.5 - 16.0 g/dL Final    HCT 10/26/2017 43.6  35.0 - 47.0 % Final    MCV 10/26/2017 87.4  80.0 - 99.0 FL Final    MCH 10/26/2017 28.3  26.0 - 34.0 PG Final    MCHC 10/26/2017 32.3  30.0 - 36.5 g/dL Final    RDW 10/26/2017 13.7  11.5 - 14.5 % Final    PLATELET 81/80/3935 764* 150 - 400 K/uL Final    NEUTROPHILS 10/26/2017 53  32 - 75 % Final    LYMPHOCYTES 10/26/2017 38  12 - 49 % Final    MONOCYTES 10/26/2017 8  5 - 13 % Final    EOSINOPHILS 10/26/2017 1  0 - 7 % Final    BASOPHILS 10/26/2017 0  0 - 1 % Final    ABS. NEUTROPHILS 10/26/2017 6.4  1.8 - 8.0 K/UL Final    ABS. LYMPHOCYTES 10/26/2017 4.6* 0.8 - 3.5 K/UL Final    ABS. MONOCYTES 10/26/2017 1.0  0.0 - 1.0 K/UL Final    ABS. EOSINOPHILS 10/26/2017 0.1  0.0 - 0.4 K/UL Final    ABS. BASOPHILS 10/26/2017 0.0  0.0 - 0.1 K/UL Final    Sodium 10/26/2017 141  136 - 145 mmol/L Final    Potassium 10/26/2017 4.2  3.5 - 5.1 mmol/L Final    Chloride 10/26/2017 104  97 - 108 mmol/L Final    CO2 10/26/2017 28  21 - 32 mmol/L Final    Anion gap 10/26/2017 9  5 - 15 mmol/L Final    Glucose 10/26/2017 86  65 - 100 mg/dL Final    BUN 10/26/2017 15  6 - 20 MG/DL Final    Creatinine 10/26/2017 1.12* 0.55 - 1.02 MG/DL Final    BUN/Creatinine ratio 10/26/2017 13  12 - 20   Final    GFR est AA 10/26/2017 >60  >60 ml/min/1.73m2 Final    GFR est non-AA 10/26/2017 59* >60 ml/min/1.73m2 Final    Calcium 10/26/2017 9.3  8.5 - 10.1 MG/DL Final    Pregnancy test,urine (POC) 10/26/2017 NEGATIVE   NEG   Final    Special Requests: 10/26/2017     Final                    Value:NO SPECIAL REQUESTS  Reflexed from [id number]      [place] Count 10/26/2017     Final                    Value:[id number]  COLONIES/mL      Culture result: 10/26/2017 MIXED UROGENITAL ZACK ISOLATED    Final    Culture result: 10/26/2017 INCLUDING 2,000 COLONIES/mL OF STREPTOCOCCI, BETA HEMOLYTIC GROUP B*  Final     No results found. DISPOSITION:    Home. Patient to f/u with drug/etoh rehabilitation, psychiatric, and psychotherapy appointments. Patient is to f/u with internist as directed. FOLLOW-UP CARE:    Activity as tolerated  Regular Diet  Wound Care: none needed.   Follow-up Information     Follow up With Details Comments Contact Info    Provider Unknown   Patient not available to ask                   PROGNOSIS:    Guarded / Poor---- based on nature of patient's pathology/ies and treatment compliance issues. Prognosis is greatly dependent upon patient's ability to remain sober and to follow up with drug/etoh rehabilitation and psychiatric/psychotherapy appointments as well as to comply with psychiatric medications as prescribed. DISCHARGE MEDICATIONS:     Informed consent given for the use of following psychotropic medications:  Current Discharge Medication List      CONTINUE these medications which have NOT CHANGED    Details   albuterol (PROAIR HFA) 90 mcg/actuation inhaler Take 2 Puffs by inhalation every six (6) hours as needed for Wheezing. Qty: 1 Inhaler, Refills: 0                    A coordinated, multidisplinary treatment team round was conducted with Venita Galarza---this is done daily here at [facility]. This team consists of the nurse, psychiatric unit pharmcist,  and writer. I have spent greater than 35 minutes on discharge work.     Signed:  Damir Capone MD  10/30/2017

## 2017-10-30 NOTE — CONSULTS
[facility]   [address], [address]   [facility]       Name:  Gabe Oneal   MR#:  [id number]   :  1990   Account #:  [de-identified]    Date of Consultation:  10/27/2017   Date of Adm:  10/26/2017       DATE OF CONSULT: The patient was seen and evaluated on   10/27/2017. REFERRING PHYSICIAN: Rob Sharp MD     REASON FOR CONSULTATION: Medical evaluation for psychiatric   admission. CHIEF COMPLAINT: Suicidal and homicidal ideations with substance   abuse. HISTORY OF PRESENT ILLNESS: A 41-year-old female presents   suicidal and homicidal, admitted for further psychiatric evaluation and   treatment. She also has heroin problems. Denies any chest pain,   shortness of breath, nausea, vomiting or diarrhea. PAST MEDICAL HISTORY: Asthma. PAST SURGICAL HISTORY: None. ALLERGIES: NONE. MEDICATIONS: ProAir HFA. SOCIAL HISTORY: Does smoke a pack of cigarettes a day. Denies   any alcohol use. Does admit to sniffing heroin. Denies any cocaine or   marijuana use. Single, has no kids. Currently unemployed. PHYSICAL EXAMINATION   VITAL SIGNS: Temperature 97.0, blood pressure 101/62, pulse 71,   respirations 16. GENERAL: Pleasant, no acute distress. HEAD, EARS, EYES, NOSE, THROAT:  Oropharynx is clear. NECK: Supple. LUNGS: Clear to auscultation. No wheezes, rales or rhonchi. CARDIOVASCULAR: Regular rate. No murmurs, gallops, or rubs. ABDOMEN: Soft, nontender, nondistended, normoactive bowel   sounds. No hepatosplenomegaly. EXTREMITIES: No cyanosis, clubbing, or edema. LABORATORY DATA: Urine hCG was negative. Tox screen is positive   for cocaine, opioids and marijuana, WBCs 12.4, hemoglobin is 14.1,   hematocrit is 43.6, platelets 908. Sodium 141, potassium 4.2, chloride   104, bicarbonate 20, BUN is 15, creatinine 1.12, glucose 86.     IMPRESSION: This is a 41-year-old female with past medical history of   polysubstance abuse, presents with suicidal and homicidal ideation,   admitted for further psychiatric evaluation and treatment. PLAN:   1. Psychiatry management of mental health issues. 2. Psychiatry to manage substance withdrawal symptoms. 3. No VTE prophylaxis indicated or warranted at this time. 4. The patient is medically stable. We will follow up on a p.r.n. basis. Thank you for this consult.         Celine Padilla MD DC / Carley Gifford   D:  10/29/2017   23:46   T:  10/30/2017   04:52   Job #:  [id number]

## 2017-10-30 NOTE — DISCHARGE INSTRUCTIONS
DISCHARGE SUMMARY from Nurse    PATIENT INSTRUCTIONS:      What to do at Home:  Recommended activity: Activity as tolerated,     If I feel that I can not keep these promises and I am at risk of hurting myself or others, I will call the crisis office and speak with a crisis worker who will assist me during my crisis. [facility] Crisis  [address] [facility] Crisis  [phone]       *  Please give a list of your current medications to your Primary Care Provider. *  Please update this list whenever your medications are discontinued, doses are      changed, or new medications (including over-the-counter products) are added. *  Please carry medication information at all times in case of emergency situations. These are general instructions for a healthy lifestyle:    No smoking/ No tobacco products/ Avoid exposure to second hand smoke  Surgeon General's Warning:  Quitting smoking now greatly reduces serious risk to your health. Obesity, smoking, and sedentary lifestyle greatly increases your risk for illness    A healthy diet, regular physical exercise & weight monitoring are important for maintaining a healthy lifestyle      Recognize signs and symptoms of STROKE:    F-face looks uneven    A-arms unable to move or move unevenly    S-speech slurred or non-existent    T-time-call 911 as soon as signs and symptoms begin-DO NOT go       Back to bed or wait to see if you get better-TIME IS BRAIN. Warning Signs of HEART ATTACK     Call 911 if you have these symptoms:   Chest discomfort. Most heart attacks involve discomfort in the center of the chest that lasts more than a few minutes, or that goes away and comes back. It can feel like uncomfortable pressure, squeezing, fullness, or pain.  Discomfort in other areas of the upper body.  Symptoms can include pain or discomfort in one or both arms, the back, neck, jaw, or stomach.  Shortness of breath with or without chest discomfort.  Other signs may include breaking out in a cold sweat, nausea, or lightheadedness. Don't wait more than five minutes to call 911 - MINUTES MATTER! Fast action can save your life. Calling 911 is almost always the fastest way to get lifesaving treatment. Emergency Medical Services staff can begin treatment when they arrive -- up to an hour sooner than if someone gets to the hospital by car. The discharge information has been reviewed with the patient. The patient verbalized understanding. Discharge medications reviewed with the patient and appropriate educational materials and side effects teaching were provided.   ___________________________________________________________________________________________________________________________________

## 2017-10-30 NOTE — BH NOTES
Patient slept 7hrs during the night, no complaints voiced, given motrin this morning for body aches. Tolerated well.

## 2017-10-30 NOTE — BH NOTES
Patient was discharged today. Discharge forms were signed and given to patient after being verified by patient. Discharge instructions were explained to patient and patient indicated understanding verbally. Patient was given belongings, and was escorted from the unit by staff.

## 2017-10-30 NOTE — BH NOTES
Pt visible on the unit, meals and meds compliant, attended schedule groups and activities with no problem. Pt interacted with select peers, appropriate behavior noted. Pt was concerned about being discharge and what time she would be leaving. Pt offered no self disclosure, no S/I H/I A/H noted or reported. Pt affect bright, no anxiety noted at this time, remain on Q 15 min checks for safety, will monitor and offer support when needed.

## 2018-07-26 ENCOUNTER — HOSPITAL ENCOUNTER (EMERGENCY)
Age: 28
Discharge: HOME | End: 2018-07-26
Attending: EMERGENCY MEDICINE | Admitting: EMERGENCY MEDICINE
Payer: SELF-PAY

## 2018-07-26 VITALS
RESPIRATION RATE: 18 BRPM | BODY MASS INDEX: 23.61 KG/M2 | HEIGHT: 62 IN | OXYGEN SATURATION: 100 % | DIASTOLIC BLOOD PRESSURE: 76 MMHG | SYSTOLIC BLOOD PRESSURE: 117 MMHG | TEMPERATURE: 98.4 F | WEIGHT: 128.31 LBS | HEART RATE: 88 BPM

## 2018-07-26 DIAGNOSIS — K02.9 CARIES: ICD-10-CM

## 2018-07-26 DIAGNOSIS — J45.909 MODERATE ASTHMA WITHOUT COMPLICATION, UNSPECIFIED WHETHER PERSISTENT: ICD-10-CM

## 2018-07-26 DIAGNOSIS — H10.9 CONJUNCTIVITIS OF BOTH EYES, UNSPECIFIED CONJUNCTIVITIS TYPE: Primary | ICD-10-CM

## 2018-07-26 DIAGNOSIS — K08.89 TOOTHACHE: ICD-10-CM

## 2018-07-26 PROCEDURE — 77030029684 HC NEB SM VOL KT MONA -A

## 2018-07-26 PROCEDURE — 74011000250 HC RX REV CODE- 250: Performed by: PHYSICIAN ASSISTANT

## 2018-07-26 PROCEDURE — 74011250637 HC RX REV CODE- 250/637: Performed by: PHYSICIAN ASSISTANT

## 2018-07-26 PROCEDURE — 99283 EMERGENCY DEPT VISIT LOW MDM: CPT

## 2018-07-26 PROCEDURE — 94640 AIRWAY INHALATION TREATMENT: CPT

## 2018-07-26 RX ORDER — AMOXICILLIN 500 MG/1
500 TABLET, FILM COATED ORAL 3 TIMES DAILY
Qty: 30 TAB | Refills: 0 | Status: SHIPPED | OUTPATIENT
Start: 2018-07-26 | End: 2019-05-02

## 2018-07-26 RX ORDER — NAPROXEN 500 MG/1
500 TABLET ORAL
Qty: 20 TAB | Refills: 0 | Status: SHIPPED | OUTPATIENT
Start: 2018-07-26 | End: 2019-05-02 | Stop reason: ALTCHOICE

## 2018-07-26 RX ORDER — IPRATROPIUM BROMIDE AND ALBUTEROL SULFATE 2.5; .5 MG/3ML; MG/3ML
3 SOLUTION RESPIRATORY (INHALATION)
Status: COMPLETED | OUTPATIENT
Start: 2018-07-26 | End: 2018-07-26

## 2018-07-26 RX ORDER — NAPROXEN 250 MG/1
500 TABLET ORAL
Status: COMPLETED | OUTPATIENT
Start: 2018-07-26 | End: 2018-07-26

## 2018-07-26 RX ORDER — HYDROCODONE BITARTRATE AND ACETAMINOPHEN 5; 325 MG/1; MG/1
1 TABLET ORAL
Status: COMPLETED | OUTPATIENT
Start: 2018-07-26 | End: 2018-07-26

## 2018-07-26 RX ORDER — OFLOXACIN 3 MG/ML
1 SOLUTION/ DROPS OPHTHALMIC
Status: COMPLETED | OUTPATIENT
Start: 2018-07-26 | End: 2018-07-26

## 2018-07-26 RX ORDER — OFLOXACIN 3 MG/ML
SOLUTION/ DROPS OPHTHALMIC
Qty: 5 ML | Refills: 0 | Status: SHIPPED | OUTPATIENT
Start: 2018-07-26 | End: 2019-05-02 | Stop reason: ALTCHOICE

## 2018-07-26 RX ORDER — HYDROCODONE BITARTRATE AND ACETAMINOPHEN 5; 325 MG/1; MG/1
1 TABLET ORAL
Qty: 6 TAB | Refills: 0 | Status: SHIPPED | OUTPATIENT
Start: 2018-07-26 | End: 2019-05-02 | Stop reason: ALTCHOICE

## 2018-07-26 RX ORDER — ALBUTEROL SULFATE 90 UG/1
2 AEROSOL, METERED RESPIRATORY (INHALATION)
Qty: 1 INHALER | Refills: 1 | Status: SHIPPED | OUTPATIENT
Start: 2018-07-26 | End: 2022-08-21

## 2018-07-26 RX ORDER — AMOXICILLIN 250 MG/1
500 CAPSULE ORAL
Status: COMPLETED | OUTPATIENT
Start: 2018-07-26 | End: 2018-07-26

## 2018-07-26 RX ADMIN — OFLOXACIN 1 DROP: 3 SOLUTION OPHTHALMIC at 23:12

## 2018-07-26 RX ADMIN — AMOXICILLIN 500 MG: 250 CAPSULE ORAL at 23:12

## 2018-07-26 RX ADMIN — HYDROCODONE BITARTRATE AND ACETAMINOPHEN 1 TABLET: 5; 325 TABLET ORAL at 23:12

## 2018-07-26 RX ADMIN — NAPROXEN 500 MG: 250 TABLET ORAL at 23:12

## 2018-07-26 RX ADMIN — IPRATROPIUM BROMIDE AND ALBUTEROL SULFATE 3 ML: .5; 3 SOLUTION RESPIRATORY (INHALATION) at 23:12

## 2018-07-26 RX ADMIN — LIDOCAINE HYDROCHLORIDE: 20 SOLUTION ORAL; TOPICAL at 23:11

## 2018-07-26 NOTE — LETTER
Count includes the [facility] EMERGENCY DEPT 
[address] [place] [address] [address] [id number] [phone] Work/School Note Date: 7/26/2018 To Whom It May concern: 
 
Stephanie Aguilar was seen and treated today in the emergency room by the following provider(s): 
Attending Provider: Alonzo Prado MD 
Physician Assistant: MEET Patel. Stephanie Aguilar may return to work on 7/29/18 or sooner, if feeling better. Sincerely, Lucien Mendez PA

## 2018-07-27 NOTE — ED NOTES
Discharge instructions given to patient by MEET Nicholson. Patient verbalized understanding of discharge instructions. Pt discharged without difficulty. Pt discharged in stable condition via ambulation.

## 2018-07-27 NOTE — DISCHARGE INSTRUCTIONS
Emergency [address] by KARAN KOWALSKI [facility]  [address] [facility], [address]  Open M, W, F: 8AM - 5PM and T, Th: 8AM-6PM  Phone: [phone], press 4  $70 for Emergency Care  $60 for first routine care, then pay by sliding scale based upon income. [facility][note 3]  [address] [place], [address]  Phone: [phone]    [employer]  [address] [facility], [address]  Open Monday - Friday 8AM - 4:30 PM  Phone: [phone] of Dentistry [place] [id number] [facility], [address] [place] [address], [place] [address], [address] [place] [address] starting at 8:30 AM M-F  Phone: [phone], press 2  Fee: $150 per tooth (x-ray & extractions only)  Pediatrics Phone[de-identified] [phone], 8-5 M-F    [address] [facility], [address] [place] [address], [place] [address], 2nd Floor, [address] [place] [address] starting at 8:30 AM - 3 PM [address] [place] [address]  [address] [facility], [address] [place] [address]  Phone: [phone] or [phone]  Emergency Hours: 9:30AM - 11AM (extractions)  Simple tooth extraction $ per tooth. #75 for x-ray    [facility] Residents only, over the age of 25  Phone: [phone]. Leave message saying you need an appointment to register. Hours: Tuesday Evenings         Pinkeye: Care Instructions  Your Care Instructions    Pinkeye is redness and swelling of the eye surface and the conjunctiva (the lining of the eyelid and the covering of the white part of the eye). Pinkeye is also called conjunctivitis. Pinkeye is often caused by infection with bacteria or a virus. Dry air, allergies, smoke, and chemicals are other common causes. Pinkeye often clears on its own in 7 to 10 days. Antibiotics only help if the pinkeye is caused by bacteria. Pinkeye caused by infection spreads easily.  If an allergy or chemical is causing pinkeye, it will not go away unless you can avoid whatever is causing it. Follow-up care is a key part of your treatment and safety. Be sure to make and go to all appointments, and call your doctor if you are having problems. It's also a good idea to know your test results and keep a list of the medicines you take. How can you care for yourself at home? · Wash your hands often. Always wash them before and after you treat pinkeye or touch your eyes or face. · Use moist cotton or a clean, wet cloth to remove crust. Wipe from the inside corner of the eye to the outside. Use a clean part of the cloth for each wipe. · Put cold or warm wet cloths on your eye a few times a day if the eye hurts. · Do not wear contact lenses or eye makeup until the pinkeye is gone. Throw away any eye makeup you were using when you got pinkeye. Clean your contacts and storage case. If you wear disposable contacts, use a new pair when your eye has cleared and it is safe to wear contacts again. · If the doctor gave you antibiotic ointment or eyedrops, use them as directed. Use the medicine for as long as instructed, even if your eye starts looking better soon. Keep the bottle tip clean, and do not let it touch the eye area. · To put in eyedrops or ointment:  ¨ Tilt your head back, and pull your lower eyelid down with one finger. ¨ Drop or squirt the medicine inside the lower lid. ¨ Close your eye for 30 to 60 seconds to let the drops or ointment move around. ¨ Do not touch the ointment or dropper tip to your eyelashes or any other surface. · Do not share towels, pillows, or washcloths while you have pinkeye. When should you call for help?   Call your doctor now or seek immediate medical care if:    · You have pain in your eye, not just irritation on the surface.     · You have a change in vision or loss of vision.     · You have an increase in discharge from the eye.     · Your eye has not started to improve or begins to get worse within 48 hours after you start using antibiotics.     · Pinkeye lasts longer than 7 days.    Watch closely for changes in your health, and be sure to contact your doctor if you have any problems. Where can you learn more? Go to http://eddie-leonardo.info/. Enter Y392 in the search box to learn more about \"Pinkeye: Care Instructions. \"  Current as of: November 20, 2017  Content Version: 11.7  © 1020-8786 [employer]. Care instructions adapted under license by [employer] (which disclaims liability or warranty for this information). If you have questions about a medical condition or this instruction, always ask your healthcare professional. [address] any warranty or liability for your use of this information. Tooth Decay: Care Instructions  Your Care Instructions    Tooth decay is damage to a tooth caused by plaque. Plaque is a thin film of bacteria that sticks to the teeth above and below the gum line. If plaque isn't removed from the teeth, it can build up and harden into tartar. The bacteria in plaque and tartar use sugars in food to make acids. These acids can cause tooth decay and gum disease. Any part of your tooth can decay, from the roots below the gum line to the chewing surface. Decay can affect the outer layer (enamel) or inner layer (dentin) of your teeth. The deeper the decay, the worse the damage. Untreated tooth decay will get worse and may lead to tooth loss. If you have a small hole (cavity) in your tooth, your dentist can repair it by removing the decay and filling the hole. If you have deeper decay, you may need more treatment. A very badly damaged tooth may have to be removed. Follow-up care is a key part of your treatment and safety. Be sure to make and go to all appointments, and call your dentist if you are having problems.  It's also a good idea to know your test results and keep a list of the medicines you take. How can you care for yourself at home? If you have pain:  · Take an over-the-counter pain medicine, such as acetaminophen (Tylenol), ibuprofen (Advil, Motrin), or naproxen (Aleve). Be safe with medicines. Read and follow all instructions on the label. ¨ Do not take two or more pain medicines at the same time unless the doctor told you to. Many pain medicines have acetaminophen, which is Tylenol. Too much acetaminophen (Tylenol) can be harmful. · Put ice or a cold pack on your cheek over the tooth for 10 to 15 minutes at a time. Put a thin cloth between the ice and your skin. To prevent tooth decay  · Brush teeth twice a day, and floss once a day. Brushing with fluoride toothpaste and flossing may be enough to reverse early decay. · Use a toothbrush with soft, rounded-end bristles and a head that is small enough to reach all parts of your teeth and mouth. Replace your toothbrush every 3 or 4 months. You may also use an electric toothbrush that has rotating and oscillating (back-and-forth) action. · Ask your dentist about having fluoride treatments at the dental office. · Brush your tongue to help get rid of bacteria. · Eat healthy foods that include whole grains, vegetables, and fruits. · Have your teeth cleaned by a professional at least two times a year. · Do not smoke or use smokeless tobacco. Tobacco can make tooth decay worse. When should you call for help? Call 911 anytime you think you may need emergency care. For example, call if:    · You have trouble breathing.    Call your dentist now or seek immediate medical care if:    · You have new or worse symptoms of infection, such as:  ¨ Increased pain, swelling, warmth, or redness. ¨ Red streaks leading from the area. ¨ Pus draining from the area. ¨ A fever.    Watch closely for changes in your health, and be sure to contact your doctor if:    · You do not get better as expected. Where can you learn more?   Go to http://eddie-leonardo.info/. Enter T355 in the search box to learn more about \"Tooth Decay: Care Instructions. \"  Current as of: May 12, 2017  Content Version: 11.7  © 9648-2430 [employer]. Care instructions adapted under license by [employer] (which disclaims liability or warranty for this information). If you have questions about a medical condition or this instruction, always ask your healthcare professional. [place] [address] any warranty or liability for your use of this information. Controlling Your Asthma: Care Instructions  Your Care Instructions    Asthma is a long-term condition that affects your breathing. It causes the airways that lead to the lungs to swell. During an asthma attack, the airways swell and narrow. This makes it hard to breathe. You may wheeze or cough. If you have a bad attack, you may need emergency care. There are two things to do to treat asthma. · Control asthma over the long term. · Treat attacks when they occur. You and your doctor can make an asthma action plan. It tells you what medicines you need to take every day to control asthma symptoms and what to do if you have an asthma attack. Your asthma action plan can help prevent and treat attacks. When you keep your asthma under control, you can prevent severe attacks and lasting damage to your airways. You need to treat your asthma even when you are not having symptoms. Although asthma is a lifelong disease, treatment can help control it and help you stay healthy. Follow-up care is a key part of your treatment and safety. Be sure to make and go to all appointments, and call your doctor if you are having problems. It's also a good idea to know your test results and keep a list of the medicines you take. How can you care for yourself at home? To control asthma over the long term  Medicines  Controller medicines reduce swelling in your lungs.  They also prevent asthma attacks. Take your controller medicine exactly as prescribed. Talk to your doctor if you have any problems with your medicine. · Inhaled corticosteroid is a common and effective controller medicine. Using it the right way can prevent or reduce most side effects. · Take your controller medicine every day, not just when you have symptoms. It helps prevent problems before they occur. · Your doctor may prescribe another medicine that you use along with the corticosteroid. This is often a long-acting bronchodilator. Do not take this medicine by itself. Using a long-acting bronchodilator by itself can increase your risk of a severe or fatal asthma attack. · Do not take inhaled corticosteroids or long-acting bronchodilators to stop an asthma attack that has already started. They don't work fast enough to help. · Talk to your doctor before you use other medicines. Some medicines, such as aspirin, can cause asthma attacks in some people. Education  · Learn what triggers an asthma attack. Avoid these triggers when you can. Common triggers include colds, smoke, air pollution, dust, pollen, mold, pets, cockroaches, stress, and cold air. · Check yourself for asthma symptoms to know which step to follow in your action plan. Watch for things like being short of breath, having chest tightness, coughing, and wheezing. Also notice if symptoms wake you up at night or if you get tired quickly when you exercise. · If you have a peak flow meter, use it to check how well you are breathing. It can help you know when an asthma attack is going to occur. Then you can take medicine to prevent the asthma attack or make it less severe. · Do not smoke or allow others to smoke around you. Avoid smoky places. Smoking makes asthma worse. If you need help quitting, talk to your doctor about stop-smoking programs and medicines. These can increase your chances of quitting for good. · Avoid colds and the flu.  Get a pneumococcal vaccine shot. If you have had one before, ask your doctor whether you need a second dose. Get a flu vaccine every year, as soon as it's available. If you must be around people with colds or the flu, wash your hands often. To treat attacks when they occur  Use your asthma action plan when you have an attack. Your quick-relief medicine will stop an asthma attack. It relaxes the muscles that get tight around the airways. If your doctor prescribed corticosteroid pills to use during an attack, take them as directed. They may take hours to work, but they may shorten the attack and help you breathe better. · Albuterol is an effective quick-relief inhaler. · Take your quick-relief medicine exactly as prescribed. · Always bring your asthma medicine with you when you travel. · You may need to use quick-relief medicine before you exercise. · Call your doctor if you think you are having a problem with your medicine. When should you call for help? Call 911 anytime you think you may need emergency care. For example, call if:    · You are having severe trouble breathing.    Call your doctor now or seek immediate medical care if:    · Your symptoms do not get better after you have followed your asthma action plan.     · You cough up yellow, dark brown, or bloody mucus (sputum).    Watch closely for changes in your health, and be sure to contact your doctor if:    · Your coughing and wheezing get worse.     · You need to use your quick-relief medicine on more than 2 days a week (unless it is just for exercise).     · You need help figuring out what is triggering your asthma attacks. Where can you learn more? Go to http://eddie-leonardo.info/. Enter M349 in the search box to learn more about \"Controlling Your Asthma: Care Instructions. \"  Current as of: December 6, 2017  Content Version: 11.7  © 5181-2489 [employer].  Care instructions adapted under license by [employer] (which disclaims liability or warranty for this information). If you have questions about a medical condition or this instruction, always ask your healthcare professional. [address] any warranty or liability for your use of this information. Asthma in Adults: Care Instructions  Your Care Instructions    During an asthma attack, your airways swell and narrow as a reaction to certain things (triggers). This makes it hard to breathe. You may be able to prevent asthma attacks if you avoid the things that set off your asthma symptoms. Keeping your asthma under control and treating symptoms before they get bad can help you avoid severe attacks. If you can control your asthma, you may be able to do all of your normal daily activities. You may also avoid asthma attacks and trips to the hospital.  Follow-up care is a key part of your treatment and safety. Be sure to make and go to all appointments, and call your doctor if you are having problems. It's also a good idea to know your test results and keep a list of the medicines you take. How can you care for yourself at home? · Follow your asthma action plan so you can manage your symptoms at home. An asthma action plan will help you prevent and control airway reactions and will tell you what to do during an asthma attack. If you do not have an asthma action plan, work with your doctor to build one. · Take your asthma medicine exactly as prescribed. Medicine plays an important role in controlling asthma. Talk to your doctor right away if you have any questions about what to take and how to take it. ¨ Use your quick-relief medicine when you have symptoms of an attack. Quick-relief medicine often is an albuterol inhaler. Some people need to use quick-relief medicine before they exercise. ¨ Take your controller medicine every day, not just when you have symptoms. Controller medicine is usually an inhaled corticosteroid.  The goal is to prevent problems before they occur. Do not use your controller medicine to try to treat an attack that has already started. It does not work fast enough to help. ¨ If your doctor prescribed corticosteroid pills to use during an attack, take them as directed. They may take hours to work, but they may shorten the attack and help you breathe better. ¨ Keep your quick-relief medicine with you at all times. · Talk to your doctor before using other medicines. Some medicines, such as aspirin, can cause asthma attacks in some people. · Check yourself for asthma symptoms to know which step to follow in your action plan. Watch for things like being short of breath, having chest tightness, coughing, and wheezing. Also notice if symptoms wake you up at night or if you get tired quickly when you exercise. · If you have a peak flow meter, use it to check how well you are breathing. This can help you predict when an asthma attack is going to occur. Then you can take medicine to prevent the asthma attack or make it less severe. · See your doctor regularly. These visits will help you learn more about asthma and what you can do to control it. Your doctor will monitor your treatment to make sure the medicine is helping you. · Keep track of your asthma attacks and your treatment. After you have had an attack, write down what triggered it, what helped end it, and any concerns you have about your asthma action plan. Take your diary when you see your doctor. You can then review your asthma action plan and decide if it is working. · Do not smoke or allow others to smoke around you. Avoid smoky places. Smoking makes asthma worse. If you need help quitting, talk to your doctor about stop-smoking programs and medicines. These can increase your chances of quitting for good. · Learn what triggers an asthma attack for you, and avoid the triggers when you can.  Common triggers include colds, smoke, air pollution, dust, pollen, mold, pets, cockroaches, stress, and cold air. · Avoid colds and the flu. Get a pneumococcal vaccine shot. If you have had one before, ask your doctor whether you need a second dose. Get a flu vaccine every fall. If you must be around people with colds or the flu, wash your hands often. When should you call for help? Call 911 anytime you think you may need emergency care. For example, call if:    · You have severe trouble breathing.    Call your doctor now or seek immediate medical care if:    · Your symptoms do not get better after you have followed your asthma action plan.     · You cough up yellow, dark brown, or bloody mucus (sputum).    Watch closely for changes in your health, and be sure to contact your doctor if:    · Your coughing and wheezing get worse.     · You need to use quick-relief medicine on more than 2 days a week (unless it is just for exercise).     · You need help figuring out what is triggering your asthma attacks. Where can you learn more? Go to http://eddie-leonardo.info/. Enter P597 in the search box to learn more about \"Asthma in Adults: Care Instructions. \"  Current as of: December 6, 2017  Content Version: 11.7  © 2988-9260 [employer]. Care instructions adapted under license by [employer] (which disclaims liability or warranty for this information). If you have questions about a medical condition or this instruction, always ask your healthcare professional. [place] [address] any warranty or liability for your use of this information.

## 2018-07-27 NOTE — ED PROVIDER NOTES
EMERGENCY DEPARTMENT HISTORY AND PHYSICAL EXAM          Date: 7/26/2018  Patient Name: Whitney Jones    History of Presenting Illness     Chief Complaint   Patient presents with    Dental Pain     Pt ambulatory to triage, states dental pain to L upper mouth x 1 month-pain radiating to L side of face and ear; pt denies abscess, swelling       History Provided By: Patient    HPI: Whitney Jones is a 33 y.o. female, pmhx asthma, who presents ambulatory to the ED c/o left face pain x one month. She also complains of eye irritation and wheezing. She has poor dentition. She has no dentist. She has vomited once. Her eyes crust shut in the morning and she has to pry them open. She denies any injury to her eye. She does not wear glasses or contacts. The pain from her tooth radiates to her ear. She has had a low grade fever. She specifically denies any recent, chills, nausea, , diarrhea, abd pain, CP, urinary sxs, changes in BM, or coughing. She denies history of diabetes, kidney disease, liver disease, thyroid disease    PCP: PROVIDER UNKNOWN    There are no other complaints, changes, or physical findings at this time. Current Facility-Administered Medications   Medication Dose Route Frequency Provider Last Rate Last Dose    HYDROcodone-acetaminophen (NORCO) 5-325 mg per tablet 1 Tab  1 Tab Oral NOW Patience Niobrara., PA        amoxicillin (AMOXIL) capsule 500 mg  500 mg Oral NOW Patience Niobrara., PA        naproxen (NAPROSYN) tablet 500 mg  500 mg Oral NOW Patience Niobrara., PA        dental ball (lidocaine/Benadryl/Cetacaine) mixture   Mucous Membrane ONCE Patience Niobrara., PA         Current Outpatient Prescriptions   Medication Sig Dispense Refill    albuterol (PROAIR HFA) 90 mcg/actuation inhaler Take 2 Puffs by inhalation every six (6) hours as needed for Wheezing.  1 Inhaler 0       Past History     Past Medical History:  Past Medical History:   Diagnosis Date    ADHD (attention deficit hyperactivity disorder)     Asthma        Past Surgical History:  History reviewed. No pertinent surgical history. Family History:  Family History   Problem Relation Age of Onset   Bharat Harder Asthma Mother     Hypertension Mother     Asthma Sister     Hypertension Sister     Asthma Brother     Hypertension Brother     Depression Brother        Social History:  Social History   Substance Use Topics    Smoking status: Current Every Day Smoker     Packs/day: 0.50    Smokeless tobacco: Never Used    Alcohol use Yes       Allergies:  No Known Allergies      Review of Systems   Review of Systems   Constitutional: Positive for fever. Negative for activity change, appetite change and fatigue. HENT: Positive for dental problem and ear pain. Negative for congestion, facial swelling, hearing loss, postnasal drip, rhinorrhea, sinus pain and sinus pressure. Eyes: Positive for redness and itching. Negative for photophobia, pain, discharge and visual disturbance. Respiratory: Positive for chest tightness and wheezing. Negative for cough, shortness of breath and stridor. Cardiovascular: Negative for chest pain and leg swelling. Gastrointestinal: Negative for abdominal distention, abdominal pain and blood in stool. Genitourinary: Negative for difficulty urinating, dysuria, flank pain, frequency, vaginal bleeding, vaginal discharge and vaginal pain. Musculoskeletal: Negative for arthralgias, back pain, gait problem, joint swelling and neck pain. Skin: Negative for rash and wound. Neurological: Negative for dizziness, syncope, weakness, numbness and headaches. Psychiatric/Behavioral: Negative for behavioral problems. The patient is not nervous/anxious. Physical Exam   Physical Exam   Constitutional: She is oriented to person, place, and time. She appears well-developed and well-nourished. No distress. HENT:   Head: Normocephalic and atraumatic.    Right Ear: External ear normal.   Left Ear: External ear normal.   Nose: Nose normal.   Mouth/Throat: Oropharynx is clear and moist. No oropharyngeal exudate. Caries to tooth #15. No facial swelling. Eyes: Conjunctivae and EOM are normal. Pupils are equal, round, and reactive to light. Right eye exhibits no discharge. Left eye exhibits no discharge. No scleral icterus. Neck: Normal range of motion. Neck supple. No tracheal deviation present. Cardiovascular: Normal rate, regular rhythm, normal heart sounds and intact distal pulses. Exam reveals no gallop and no friction rub. No murmur heard. Pulmonary/Chest: Effort normal. No accessory muscle usage. No respiratory distress. She has wheezes. She has no rales. She exhibits no tenderness. Musculoskeletal: She exhibits no edema or tenderness. Lymphadenopathy:        Head (left side): Submandibular adenopathy present. She has no cervical adenopathy. Neurological: She is alert and oriented to person, place, and time. No cranial nerve deficit. Skin: Skin is warm and dry. No rash noted. No erythema. Psychiatric: She has a normal mood and affect. Her behavior is normal.   Nursing note and vitals reviewed. Diagnostic Study Results     Labs -   No results found for this or any previous visit (from the past 12 hour(s)). Radiologic Studies -   No orders to display     CT Results  (Last 48 hours)    None        CXR Results  (Last 48 hours)    None            Medical Decision Making   I am the first provider for this patient. I reviewed the vital signs, available nursing notes, past medical history, past surgical history, family history and social history. Vital Signs-Reviewed the patient's vital signs. Patient Vitals for the past 12 hrs:   Temp Pulse Resp BP SpO2   07/26/18 2044 98.4 °F (36.9 °C) 88 18 117/76 100 %       Records Reviewed: Nursing Notes and Old Medical Records    Provider Notes (Medical Decision Making):     DDx: toothache, conjunctivitis, asthma.      ED Course:   Initial assessment performed. The patients presenting problems have been discussed, and they are in agreement with the care plan formulated and outlined with them. I have encouraged them to ask questions as they arise throughout their visit. Pt does not feel a short course of pain medicine will cause similar problems as in her past medical hx.     10:59 PM  Spent 5 minutes discussing the risks of smoking and the benefits of smoking cessation as well as the long term sequelae of smoking with the pt who verbalized her understanding. Reviewed strategies for success, including gradually decreasing the number of cigarettes smoked a day. RANJEET Castellanos      PROGRESS NOTE:  11:16 PM  Pt noted to be feeling better, ready for discharge. Will follow up as instructed. All questions have been answered, pt voiced understanding and agreement with plan. Narcotics were prescribed, pt was advised not to drive or operate heavy machinery. Abx were prescribed, pt advised that diarrhea and rash are possible side effects of the medications. Specific return precautions provided as well as instructions to return to the ED should sx worsen at any time. Vital signs stable for discharge. RANJEET Castellanos    Disposition:    DISCHARGE NOTE:  11:16 PM    The patient's results have been reviewed with family and/or caregiver. They verbally convey their understanding and agreement of the patient's signs, symptoms, diagnosis, treatment, and prognosis. They additionally agree to follow up as recommended in the discharge instructions or to return to the Emergency Room should the patient's condition change prior to their follow-up appointment. The family and/or caregiver verbally agrees with the care-plan and all of their questions have been answered.  The discharge instructions have also been provided to the them along with educational information regarding the patient's diagnosis and a list of reasons why the patient would want to return to the ER prior to their follow-up appointment should their condition change. RANJEET Montague    PLAN:  1. Discharge Medication List as of 7/26/2018 11:20 PM      START taking these medications    Details   naproxen (NAPROSYN) 500 mg tablet Take 1 Tab by mouth every twelve (12) hours as needed for Pain., Print, Disp-20 Tab, R-0      ofloxacin (FLOXIN) 0.3 % ophthalmic solution One drop in each eye four times a day for 5 days. , Print, Disp-5 mL, R-0      HYDROcodone-acetaminophen (NORCO) 5-325 mg per tablet Take 1 Tab by mouth every eight (8) hours as needed for Pain. Max Daily Amount: 3 Tabs. Patient must fill antibiotic in order to fill this. , Print, Disp-6 Tab, R-0      amoxicillin 500 mg tab Take 500 mg by mouth three (3) times daily. , Print, Disp-30 Tab, R-0         CONTINUE these medications which have CHANGED    Details   albuterol (PROVENTIL HFA, VENTOLIN HFA, PROAIR HFA) 90 mcg/actuation inhaler Take 2 Puffs by inhalation every four (4) hours as needed for Wheezing., Print, Disp-1 Inhaler, R-1           2. Follow-up Information     Follow up With Details Comments Contact Info    Local dentist Schedule an appointment as soon as possible for a visit      [facility]  If symptoms worsen [address] [place] [address]  [phone]        Return to ED if worse     Diagnosis     Clinical Impression:   1. Conjunctivitis of both eyes, unspecified conjunctivitis type    2. Toothache    3. Caries    4.  Moderate asthma without complication, unspecified whether persistent              Not viewable in MyChart

## 2019-04-25 ENCOUNTER — HOSPITAL ENCOUNTER (EMERGENCY)
Age: 29
Discharge: HOME | End: 2019-04-25
Attending: EMERGENCY MEDICINE
Payer: MEDICAID

## 2019-04-25 ENCOUNTER — APPOINTMENT (OUTPATIENT)
Dept: CT IMAGING | Age: 29
End: 2019-04-25
Attending: EMERGENCY MEDICINE
Payer: MEDICAID

## 2019-04-25 VITALS
RESPIRATION RATE: 18 BRPM | HEIGHT: 66 IN | WEIGHT: 132 LBS | BODY MASS INDEX: 21.21 KG/M2 | TEMPERATURE: 98 F | DIASTOLIC BLOOD PRESSURE: 78 MMHG | HEART RATE: 55 BPM | OXYGEN SATURATION: 100 % | SYSTOLIC BLOOD PRESSURE: 118 MMHG

## 2019-04-25 DIAGNOSIS — R51.9 CHRONIC NONINTRACTABLE HEADACHE, UNSPECIFIED HEADACHE TYPE: Primary | ICD-10-CM

## 2019-04-25 DIAGNOSIS — R11.2 NON-INTRACTABLE VOMITING WITH NAUSEA, UNSPECIFIED VOMITING TYPE: ICD-10-CM

## 2019-04-25 DIAGNOSIS — G89.29 CHRONIC NONINTRACTABLE HEADACHE, UNSPECIFIED HEADACHE TYPE: Primary | ICD-10-CM

## 2019-04-25 DIAGNOSIS — R07.9 CHEST PAIN, UNSPECIFIED TYPE: ICD-10-CM

## 2019-04-25 LAB
ALBUMIN SERPL-MCNC: 3.6 G/DL (ref 3.5–5)
ALBUMIN/GLOB SERPL: 1.2 {RATIO} (ref 1.1–2.2)
ALP SERPL-CCNC: 43 U/L (ref 45–117)
ALT SERPL-CCNC: 11 U/L (ref 12–78)
ANION GAP SERPL CALC-SCNC: 6 MMOL/L (ref 5–15)
AST SERPL-CCNC: 11 U/L (ref 15–37)
ATRIAL RATE: 54 BPM
BASOPHILS # BLD: 0 K/UL (ref 0–0.1)
BASOPHILS NFR BLD: 0 % (ref 0–1)
BILIRUB SERPL-MCNC: 0.3 MG/DL (ref 0.2–1)
BUN SERPL-MCNC: 9 MG/DL (ref 6–20)
BUN/CREAT SERPL: 11 (ref 12–20)
CALCIUM SERPL-MCNC: 8.9 MG/DL (ref 8.5–10.1)
CALCULATED P AXIS, ECG09: 71 DEGREES
CALCULATED R AXIS, ECG10: 85 DEGREES
CALCULATED T AXIS, ECG11: 69 DEGREES
CHLORIDE SERPL-SCNC: 109 MMOL/L (ref 97–108)
CO2 SERPL-SCNC: 26 MMOL/L (ref 21–32)
COMMENT, HOLDF: NORMAL
CREAT SERPL-MCNC: 0.8 MG/DL (ref 0.55–1.02)
DIAGNOSIS, 93000: NORMAL
DIFFERENTIAL METHOD BLD: ABNORMAL
EOSINOPHIL # BLD: 0.2 K/UL (ref 0–0.4)
EOSINOPHIL NFR BLD: 2 % (ref 0–7)
ERYTHROCYTE [DISTWIDTH] IN BLOOD BY AUTOMATED COUNT: 15.1 % (ref 11.5–14.5)
GLOBULIN SER CALC-MCNC: 3 G/DL (ref 2–4)
GLUCOSE SERPL-MCNC: 92 MG/DL (ref 65–100)
HCT VFR BLD AUTO: 38.6 % (ref 35–47)
HGB BLD-MCNC: 12.3 G/DL (ref 11.5–16)
IMM GRANULOCYTES # BLD AUTO: 0 K/UL (ref 0–0.04)
IMM GRANULOCYTES NFR BLD AUTO: 0 % (ref 0–0.5)
LYMPHOCYTES # BLD: 5.1 K/UL (ref 0.8–3.5)
LYMPHOCYTES NFR BLD: 41 % (ref 12–49)
MCH RBC QN AUTO: 28.5 PG (ref 26–34)
MCHC RBC AUTO-ENTMCNC: 31.9 G/DL (ref 30–36.5)
MCV RBC AUTO: 89.6 FL (ref 80–99)
MONOCYTES # BLD: 0.6 K/UL (ref 0–1)
MONOCYTES NFR BLD: 5 % (ref 5–13)
NEUTS SEG # BLD: 6.5 K/UL (ref 1.8–8)
NEUTS SEG NFR BLD: 52 % (ref 32–75)
NRBC # BLD: 0 K/UL (ref 0–0.01)
NRBC BLD-RTO: 0 PER 100 WBC
P-R INTERVAL, ECG05: 142 MS
PLATELET # BLD AUTO: 373 K/UL (ref 150–400)
PMV BLD AUTO: 9.3 FL (ref 8.9–12.9)
POTASSIUM SERPL-SCNC: 3.4 MMOL/L (ref 3.5–5.1)
PROT SERPL-MCNC: 6.6 G/DL (ref 6.4–8.2)
Q-T INTERVAL, ECG07: 408 MS
QRS DURATION, ECG06: 70 MS
QTC CALCULATION (BEZET), ECG08: 386 MS
RBC # BLD AUTO: 4.31 M/UL (ref 3.8–5.2)
SAMPLES BEING HELD,HOLD: NORMAL
SODIUM SERPL-SCNC: 141 MMOL/L (ref 136–145)
TROPONIN I SERPL-MCNC: <0.05 NG/ML
VENTRICULAR RATE, ECG03: 54 BPM
WBC # BLD AUTO: 12.5 K/UL (ref 3.6–11)

## 2019-04-25 PROCEDURE — 93005 ELECTROCARDIOGRAM TRACING: CPT

## 2019-04-25 PROCEDURE — 36415 COLL VENOUS BLD VENIPUNCTURE: CPT

## 2019-04-25 PROCEDURE — 96361 HYDRATE IV INFUSION ADD-ON: CPT

## 2019-04-25 PROCEDURE — 84484 ASSAY OF TROPONIN QUANT: CPT

## 2019-04-25 PROCEDURE — 96374 THER/PROPH/DIAG INJ IV PUSH: CPT

## 2019-04-25 PROCEDURE — 80053 COMPREHEN METABOLIC PANEL: CPT

## 2019-04-25 PROCEDURE — 74011000258 HC RX REV CODE- 258: Performed by: EMERGENCY MEDICINE

## 2019-04-25 PROCEDURE — 85025 COMPLETE CBC W/AUTO DIFF WBC: CPT

## 2019-04-25 PROCEDURE — 96375 TX/PRO/DX INJ NEW DRUG ADDON: CPT

## 2019-04-25 PROCEDURE — 70450 CT HEAD/BRAIN W/O DYE: CPT

## 2019-04-25 PROCEDURE — 74011250636 HC RX REV CODE- 250/636: Performed by: EMERGENCY MEDICINE

## 2019-04-25 PROCEDURE — 99285 EMERGENCY DEPT VISIT HI MDM: CPT

## 2019-04-25 RX ORDER — DIPHENHYDRAMINE HYDROCHLORIDE 50 MG/ML
25 INJECTION, SOLUTION INTRAMUSCULAR; INTRAVENOUS
Status: COMPLETED | OUTPATIENT
Start: 2019-04-25 | End: 2019-04-25

## 2019-04-25 RX ORDER — ONDANSETRON 4 MG/1
4 TABLET, ORALLY DISINTEGRATING ORAL
Qty: 12 TAB | Refills: 0 | Status: SHIPPED | OUTPATIENT
Start: 2019-04-25 | End: 2019-05-02 | Stop reason: ALTCHOICE

## 2019-04-25 RX ADMIN — PROCHLORPERAZINE EDISYLATE 10 MG: 5 INJECTION INTRAMUSCULAR; INTRAVENOUS at 06:05

## 2019-04-25 RX ADMIN — SODIUM CHLORIDE 1000 ML: 900 INJECTION, SOLUTION INTRAVENOUS at 05:50

## 2019-04-25 RX ADMIN — DIPHENHYDRAMINE HYDROCHLORIDE 25 MG: 50 INJECTION, SOLUTION INTRAMUSCULAR; INTRAVENOUS at 05:50

## 2019-04-25 NOTE — DISCHARGE INSTRUCTIONS
Patient Education   - Please take OTC Excedrin migraine for your headaches. - Zofran as needed for nausea/vomiting.  - Return to ED for any concerns. Headache: Care Instructions  Your Care Instructions    Headaches have many possible causes. Most headaches aren't a sign of a more serious problem, and they will get better on their own. Home treatment may help you feel better faster. The doctor has checked you carefully, but problems can develop later. If you notice any problems or new symptoms, get medical treatment right away. Follow-up care is a key part of your treatment and safety. Be sure to make and go to all appointments, and call your doctor if you are having problems. It's also a good idea to know your test results and keep a list of the medicines you take. How can you care for yourself at home? · Do not drive if you have taken a prescription pain medicine. · Rest in a quiet, dark room until your headache is gone. Close your eyes and try to relax or go to sleep. Don't watch TV or read. · Put a cold, moist cloth or cold pack on the painful area for 10 to 20 minutes at a time. Put a thin cloth between the cold pack and your skin. · Use a warm, moist towel or a heating pad set on low to relax tight shoulder and neck muscles. · Have someone gently massage your neck and shoulders. · Take pain medicines exactly as directed. ? If the doctor gave you a prescription medicine for pain, take it as prescribed. ? If you are not taking a prescription pain medicine, ask your doctor if you can take an over-the-counter medicine. · Be careful not to take pain medicine more often than the instructions allow, because you may get worse or more frequent headaches when the medicine wears off. · Do not ignore new symptoms that occur with a headache, such as a fever, weakness or numbness, vision changes, or confusion. These may be signs of a more serious problem.   To prevent headaches  · Keep a headache diary so you can figure out what triggers your headaches. Avoiding triggers may help you prevent headaches. Record when each headache began, how long it lasted, and what the pain was like (throbbing, aching, stabbing, or dull). Write down any other symptoms you had with the headache, such as nausea, flashing lights or dark spots, or sensitivity to bright light or loud noise. Note if the headache occurred near your period. List anything that might have triggered the headache, such as certain foods (chocolate, cheese, wine) or odors, smoke, bright light, stress, or lack of sleep. · Find healthy ways to deal with stress. Headaches are most common during or right after stressful times. Take time to relax before and after you do something that has caused a headache in the past.  · Try to keep your muscles relaxed by keeping good posture. Check your jaw, face, neck, and shoulder muscles for tension, and try relaxing them. When sitting at a desk, change positions often, and stretch for 30 seconds each hour. · Get plenty of sleep and exercise. · Eat regularly and well. Long periods without food can trigger a headache. · Treat yourself to a massage. Some people find that regular massages are very helpful in relieving tension. · Limit caffeine by not drinking too much coffee, tea, or soda. But don't quit caffeine suddenly, because that can also give you headaches. · Reduce eyestrain from computers by blinking frequently and looking away from the computer screen every so often. Make sure you have proper eyewear and that your monitor is set up properly, about an arm's length away. · Seek help if you have depression or anxiety. Your headaches may be linked to these conditions. Treatment can both prevent headaches and help with symptoms of anxiety or depression. When should you call for help? Call 911 anytime you think you may need emergency care. For example, call if:    · You have signs of a stroke.  These may include:  ? Sudden numbness, paralysis, or weakness in your face, arm, or leg, especially on only one side of your body. ? Sudden vision changes. ? Sudden trouble speaking. ? Sudden confusion or trouble understanding simple statements. ? Sudden problems with walking or balance. ? A sudden, severe headache that is different from past headaches.    Call your doctor now or seek immediate medical care if:    · You have a new or worse headache.     · Your headache gets much worse. Where can you learn more? Go to http://eddie-leonardo.info/. Enter M271 in the search box to learn more about \"Headache: Care Instructions. \"  Current as of: Melva 3, 2018  Content Version: 11.9  © 9510-7957 [employer]. Care instructions adapted under license by [employer] (which disclaims liability or warranty for this information). If you have questions about a medical condition or this instruction, always ask your healthcare professional. [place] [address] any warranty or liability for your use of this information. Patient Education        Nausea and Vomiting: Care Instructions  Your Care Instructions    When you are nauseated, you may feel weak and sweaty and notice a lot of saliva in your mouth. Nausea often leads to vomiting. Most of the time you do not need to worry about nausea and vomiting, but they can be signs of other illnesses. Two common causes of nausea and vomiting are stomach flu and food poisoning. Nausea and vomiting from viral stomach flu will usually start to improve within 24 hours. Nausea and vomiting from food poisoning may last from 12 to 48 hours. The doctor has checked you carefully, but problems can develop later. If you notice any problems or new symptoms, get medical treatment right away. Follow-up care is a key part of your treatment and safety. Be sure to make and go to all appointments, and call your doctor if you are having problems.  It's also a good idea to know your test results and keep a list of the medicines you take. How can you care for yourself at home? · To prevent dehydration, drink plenty of fluids, enough so that your urine is light yellow or clear like water. Choose water and other caffeine-free clear liquids until you feel better. If you have kidney, heart, or liver disease and have to limit fluids, talk with your doctor before you increase the amount of fluids you drink. · Rest in bed until you feel better. · When you are able to eat, try clear soups, mild foods, and liquids until all symptoms are gone for 12 to 48 hours. Other good choices include dry toast, crackers, cooked cereal, and gelatin dessert, such as Jell-O. When should you call for help? Call 911 anytime you think you may need emergency care. For example, call if:    · You passed out (lost consciousness).    Call your doctor now or seek immediate medical care if:    · You have symptoms of dehydration, such as:  ? Dry eyes and a dry mouth. ? Passing only a little dark urine. ? Feeling thirstier than usual.     · You have new or worsening belly pain.     · You have a new or higher fever.     · You vomit blood or what looks like coffee grounds.    Watch closely for changes in your health, and be sure to contact your doctor if:    · You have ongoing nausea and vomiting.     · Your vomiting is getting worse.     · Your vomiting lasts longer than 2 days.     · You are not getting better as expected. Where can you learn more? Go to http://eddie-leonardo.info/. Enter 25 580759 in the search box to learn more about \"Nausea and Vomiting: Care Instructions. \"  Current as of: September 23, 2018  Content Version: 11.9  © 1090-0788 [employer]. Care instructions adapted under license by [email or web address] (which disclaims liability or warranty for this information).  If you have questions about a medical condition or this instruction, always ask your healthcare professional. [address] any warranty or liability for your use of this information. Patient Education        Chest Pain: Care Instructions  Your Care Instructions    There are many things that can cause chest pain. Some are not serious and will get better on their own in a few days. But some kinds of chest pain need more testing and treatment. Your doctor may have recommended a follow-up visit in the next 8 to 12 hours. If you are not getting better, you may need more tests or treatment. Even though your doctor has released you, you still need to watch for any problems. The doctor carefully checked you, but sometimes problems can develop later. If you have new symptoms or if your symptoms do not get better, get medical care right away. If you have worse or different chest pain or pressure that lasts more than 5 minutes or you passed out (lost consciousness), call 911 or seek other emergency help right away. A medical visit is only one step in your treatment. Even if you feel better, you still need to do what your doctor recommends, such as going to all suggested follow-up appointments and taking medicines exactly as directed. This will help you recover and help prevent future problems. How can you care for yourself at home? · Rest until you feel better. · Take your medicine exactly as prescribed. Call your doctor if you think you are having a problem with your medicine. · Do not drive after taking a prescription pain medicine. When should you call for help? Call 911 if:    · You passed out (lost consciousness).     · You have severe difficulty breathing.     · You have symptoms of a heart attack. These may include:  ? Chest pain or pressure, or a strange feeling in your chest.  ? Sweating. ? Shortness of breath. ? Nausea or vomiting. ? Pain, pressure, or a strange feeling in your back, neck, jaw, or upper belly or in one or both shoulders or arms.   ? Lightheadedness or sudden weakness. ? A fast or irregular heartbeat. After you call 911, the  may tell you to chew 1 adult-strength or 2 to 4 low-dose aspirin. Wait for an ambulance. Do not try to drive yourself.    Call your doctor today if:    · You have any trouble breathing.     · Your chest pain gets worse.     · You are dizzy or lightheaded, or you feel like you may faint.     · You are not getting better as expected.     · You are having new or different chest pain. Where can you learn more? Go to http://eddie-leonardo.info/. Enter A120 in the search box to learn more about \"Chest Pain: Care Instructions. \"  Current as of: September 23, 2018  Content Version: 11.9  © 1205-6249 [employer]. Care instructions adapted under license by [employer] (which disclaims liability or warranty for this information). If you have questions about a medical condition or this instruction, always ask your healthcare professional. [place] [address] any warranty or liability for your use of this information.

## 2019-04-25 NOTE — ED PROVIDER NOTES
The patient presents to the ED with headache. She states she has headaches \"every day,\" but this headache is worse. She developed headache yesterday evening. The pain became severe, and she vomited 4 times. Emesis was non-bloody. She also had shortness of breath. She called EMS. EMS arrived and she declined transport. She woke up again with headache at 4:30 AM. The headache is on the left side of her head. The pain is severe, 10/10, and sharp. She also has photophobia. She denies any arm numbness or weakness. She has taken 500 mg naproxen with no relief. She has seen neurology - \"years ago,\" but does not recall who she saw. She also developed L sided chest pain at 4:30 AM. She denies any shortness of breath at this time. Of note, the patient was seen at [facility] on 4/22 for asthma exacerbation and opiate withdrawal.            Past Medical History:   Diagnosis Date    ADHD (attention deficit hyperactivity disorder)     Asthma        No past surgical history on file. Family History:   Problem Relation Age of Onset    Asthma Mother     Hypertension Mother     Asthma Sister     Hypertension Sister     Asthma Brother     Hypertension Brother     Depression Brother        Social History     Socioeconomic History    Marital status: SINGLE     Spouse name: Not on file    Number of children: Not on file    Years of education: Not on file    Highest education level: Not on file   Occupational History    Not on file   Social Needs    Financial resource strain: Not on file    Food insecurity:     Worry: Not on file     Inability: Not on file    Transportation needs:     Medical: Not on file     Non-medical: Not on file   Tobacco Use    Smoking status: Current Every Day Smoker     Packs/day: 0.50    Smokeless tobacco: Never Used   Substance and Sexual Activity    Alcohol use: Yes    Drug use: Yes     Types: Marijuana, Other, Heroin     Comment: Bianca, heroin use in the last few month.  no rehab    Sexual activity: Yes     Partners: Female   Lifestyle    Physical activity:     Days per week: Not on file     Minutes per session: Not on file    Stress: Not on file   Relationships    Social connections:     Talks on phone: Not on file     Gets together: Not on file     Attends Congregation service: Not on file     Active member of club or organization: Not on file     Attends meetings of clubs or organizations: Not on file     Relationship status: Not on file    Intimate partner violence:     Fear of current or ex partner: Not on file     Emotionally abused: Not on file     Physically abused: Not on file     Forced sexual activity: Not on file   Other Topics Concern    Not on file   Social History Narrative    ** Merged History Encounter **         ** Merged History Encounter **         Live with her girlfriend and sister. Was on disability but recently lost her disability. No legal problem reported. Started seeing psychiatrist- age 7 behavior problem. Reports recent psych hospitalization at [address] [place] [address] for possible OD/drug abuse    No abuse reported. ALLERGIES: Patient has no known allergies. Review of Systems   Constitutional: Negative for appetite change and fever. HENT: Negative for congestion, nosebleeds and sore throat. Eyes: Positive for photophobia. Negative for discharge and visual disturbance. Respiratory: Positive for cough and wheezing. Negative for shortness of breath. Cardiovascular: Positive for chest pain. Gastrointestinal: Positive for nausea and vomiting. Negative for abdominal pain and diarrhea. Genitourinary: Negative for dysuria. Musculoskeletal: Negative. Skin: Negative for rash. Neurological: Positive for headaches. Negative for weakness. Hematological: Negative for adenopathy. Psychiatric/Behavioral: Negative. All other systems reviewed and are negative.       Vitals:    04/25/19 0542   Pulse: 60   Resp: 18   Temp: 98.3 °F (36.8 °C)   SpO2: 100%   Weight: 59.9 kg (132 lb)   Height: 5' 6\" (1.676 m)            Physical Exam   Constitutional: She is oriented to person, place, and time. She appears well-developed and well-nourished. HENT:   Head: Normocephalic and atraumatic. Eyes: Conjunctivae are normal.   Neck: Normal range of motion. Neck supple. Cardiovascular: Normal rate, regular rhythm and normal heart sounds. Pulmonary/Chest: Effort normal. No respiratory distress. She has wheezes (faint end expiratory wheezes). Abdominal: Soft. Bowel sounds are normal.   Neurological: She is alert and oriented to person, place, and time. No cranial nerve deficit or sensory deficit. She exhibits normal muscle tone. Coordination normal.   Finger to nose and heel shin intact. Skin: Skin is warm and dry. Psychiatric: She has a normal mood and affect. Nursing note and vitals reviewed. MDM       Procedures    ED EKG interpretation:  Rhythm: sinus bradycardia; and regular . Rate (approx.): 54; Axis: normal; P wave: normal; QRS interval: normal ; ST/T wave: normal; Interpreted by Susan Chi MD, ED MD.    A/P:  1. Headache - improved to 3/10 at discharge. 2. N/V - none in ED. zofran prn.  3. Chest pain - improved. 4. Asthma - on Prednisone. Cont albuterol prn.    6:42 AM  Patient's results have been reviewed with them. Patient and/or family have verbally conveyed their understanding and agreement of the patient's signs, symptoms, diagnosis, treatment and prognosis and additionally agree to follow up as recommended or return to the Emergency Room should their condition change prior to follow-up. Discharge instructions have also been provided to the patient with some educational information regarding their diagnosis as well a list of reasons why they would want to return to the ER prior to their follow-up appointment should their condition change.

## 2019-04-25 NOTE — ED TRIAGE NOTES
Triage Note:  Patient called EMS at 2100 complaining of headache, SOB, nausea and vomiting x4. EMS came out for call but patient stated she was feeling some better and refused transport. Patient called back around 0430 stating she had increased head pain, chest pain and SOB.    0612:  Patient transported to CT    0708:  MD reviewed discharge instructions and options with patient and patient verbalized understanding. RN reviewed discharge instructions using teachback method. Pt ambulated to exit without difficulty and in no signs of acute distress escorted by significant other, and significant other will drive home. No complaints or needs expressed at this time. Patient was counseled on medications prescribed at discharge. VSS, verbalized relief from most intense pain. Patient to call neurologist in the morning for appointment.

## 2019-05-02 ENCOUNTER — OFFICE VISIT (OUTPATIENT)
Dept: INTERNAL MEDICINE CLINIC | Age: 29
End: 2019-05-02

## 2019-05-02 VITALS
TEMPERATURE: 98.3 F | RESPIRATION RATE: 16 BRPM | HEIGHT: 67 IN | HEART RATE: 93 BPM | OXYGEN SATURATION: 97 % | WEIGHT: 127 LBS | BODY MASS INDEX: 19.93 KG/M2 | DIASTOLIC BLOOD PRESSURE: 58 MMHG | SYSTOLIC BLOOD PRESSURE: 100 MMHG

## 2019-05-02 DIAGNOSIS — Z13.220 SCREENING FOR CHOLESTEROL LEVEL: ICD-10-CM

## 2019-05-02 DIAGNOSIS — J45.20 MILD INTERMITTENT ASTHMA WITHOUT COMPLICATION: ICD-10-CM

## 2019-05-02 DIAGNOSIS — Z00.00 ENCOUNTER FOR MEDICAL EXAMINATION TO ESTABLISH CARE: Primary | ICD-10-CM

## 2019-05-02 DIAGNOSIS — G43.109 MIGRAINE WITH AURA AND WITHOUT STATUS MIGRAINOSUS, NOT INTRACTABLE: ICD-10-CM

## 2019-05-02 DIAGNOSIS — Z13.1 SCREENING FOR DIABETES MELLITUS: ICD-10-CM

## 2019-05-02 LAB
BILIRUB UR QL STRIP: NEGATIVE
CHOLEST SERPL-MCNC: 166 MG/DL
GLUCOSE UR-MCNC: NEGATIVE MG/DL
HBA1C MFR BLD HPLC: 5.2 %
HDLC SERPL-MCNC: 48 MG/DL
KETONES P FAST UR STRIP-MCNC: NEGATIVE MG/DL
LDL CHOLESTEROL POC: 102 MG/DL
PH UR STRIP: 7 [PH] (ref 4.6–8)
PROT UR QL STRIP: NEGATIVE
SP GR UR STRIP: 1.01 (ref 1–1.03)
TCHOL/HDL RATIO (POC): 3.4
TRIGL SERPL-MCNC: 75 MG/DL
UA UROBILINOGEN AMB POC: NORMAL (ref 0.2–1)
URINALYSIS CLARITY POC: CLEAR
URINALYSIS COLOR POC: YELLOW
URINE BLOOD POC: NEGATIVE
URINE LEUKOCYTES POC: NEGATIVE
URINE NITRITES POC: NEGATIVE
VLDLC SERPL CALC-MCNC: 117 MG/DL

## 2019-05-02 RX ORDER — SUMATRIPTAN 50 MG/1
50 TABLET, FILM COATED ORAL
Qty: 30 TAB | Refills: 1 | Status: SHIPPED | OUTPATIENT
Start: 2019-05-02 | End: 2019-05-02

## 2019-05-02 NOTE — PATIENT INSTRUCTIONS
Asthma in Adults: Care Instructions  Your Care Instructions    During an asthma attack, your airways swell and narrow as a reaction to certain things (triggers). This makes it hard to breathe. You may be able to prevent asthma attacks if you avoid the things that set off your asthma symptoms. Keeping your asthma under control and treating symptoms before they get bad can help you avoid severe attacks. If you can control your asthma, you may be able to do all of your normal daily activities. You may also avoid asthma attacks and trips to the hospital.  Follow-up care is a key part of your treatment and safety. Be sure to make and go to all appointments, and call your doctor if you are having problems. It's also a good idea to know your test results and keep a list of the medicines you take. How can you care for yourself at home? · Follow your asthma action plan so you can manage your symptoms at home. An asthma action plan will help you prevent and control airway reactions and will tell you what to do during an asthma attack. If you do not have an asthma action plan, work with your doctor to build one. · Take your asthma medicine exactly as prescribed. Medicine plays an important role in controlling asthma. Talk to your doctor right away if you have any questions about what to take and how to take it. ? Use your quick-relief medicine when you have symptoms of an attack. Quick-relief medicine often is an albuterol inhaler. Some people need to use quick-relief medicine before they exercise. ? Take your controller medicine every day, not just when you have symptoms. Controller medicine is usually an inhaled corticosteroid. The goal is to prevent problems before they occur. Do not use your controller medicine to try to treat an attack that has already started. It does not work fast enough to help. ? If your doctor prescribed corticosteroid pills to use during an attack, take them as directed.  They may take hours to work, but they may shorten the attack and help you breathe better. ? Keep your quick-relief medicine with you at all times. · Talk to your doctor before using other medicines. Some medicines, such as aspirin, can cause asthma attacks in some people. · Check yourself for asthma symptoms to know which step to follow in your action plan. Watch for things like being short of breath, having chest tightness, coughing, and wheezing. Also notice if symptoms wake you up at night or if you get tired quickly when you exercise. · If you have a peak flow meter, use it to check how well you are breathing. This can help you predict when an asthma attack is going to occur. Then you can take medicine to prevent the asthma attack or make it less severe. · See your doctor regularly. These visits will help you learn more about asthma and what you can do to control it. Your doctor will monitor your treatment to make sure the medicine is helping you. · Keep track of your asthma attacks and your treatment. After you have had an attack, write down what triggered it, what helped end it, and any concerns you have about your asthma action plan. Take your diary when you see your doctor. You can then review your asthma action plan and decide if it is working. · Do not smoke or allow others to smoke around you. Avoid smoky places. Smoking makes asthma worse. If you need help quitting, talk to your doctor about stop-smoking programs and medicines. These can increase your chances of quitting for good. · Learn what triggers an asthma attack for you, and avoid the triggers when you can. Common triggers include colds, smoke, air pollution, dust, pollen, mold, pets, cockroaches, stress, and cold air. · Avoid colds and the flu. Get a pneumococcal vaccine shot. If you have had one before, ask your doctor whether you need a second dose. Get a flu vaccine every fall.  If you must be around people with colds or the flu, wash your hands often.  When should you call for help? Call 911 anytime you think you may need emergency care. For example, call if:    · You have severe trouble breathing.    Call your doctor now or seek immediate medical care if:    · Your symptoms do not get better after you have followed your asthma action plan.     · You cough up yellow, dark brown, or bloody mucus (sputum).    Watch closely for changes in your health, and be sure to contact your doctor if:    · Your coughing and wheezing get worse.     · You need to use quick-relief medicine on more than 2 days a week (unless it is just for exercise).     · You need help figuring out what is triggering your asthma attacks. Where can you learn more? Go to http://eddie-leonardo.info/. Enter P597 in the search box to learn more about \"Asthma in Adults: Care Instructions. \"  Current as of: September 5, 2018  Content Version: 11.9  © 6101-4078 [employer]. Care instructions adapted under license by [employer] (which disclaims liability or warranty for this information). If you have questions about a medical condition or this instruction, always ask your healthcare professional. [place] [address] any warranty or liability for your use of this information. Migraine Headache: Care Instructions  Your Care Instructions  Migraines are painful, throbbing headaches that often start on one side of the head. They may cause nausea and vomiting and make you sensitive to light, sound, or smell. Without treatment, migraines can last from 4 hours to a few days. Medicines can help prevent migraines or stop them after they have started. Your doctor can help you find which ones work best for you. Follow-up care is a key part of your treatment and safety. Be sure to make and go to all appointments, and call your doctor if you are having problems.  It's also a good idea to know your test results and keep a list of the medicines you take. How can you care for yourself at home? · Do not drive if you have taken a prescription pain medicine. · Rest in a quiet, dark room until your headache is gone. Close your eyes, and try to relax or go to sleep. Don't watch TV or read. · Put a cold, moist cloth or cold pack on the painful area for 10 to 20 minutes at a time. Put a thin cloth between the cold pack and your skin. · Use a warm, moist towel or a heating pad set on low to relax tight shoulder and neck muscles. · Have someone gently massage your neck and shoulders. · Take your medicines exactly as prescribed. Call your doctor if you think you are having a problem with your medicine. You will get more details on the specific medicines your doctor prescribes. · Be careful not to take pain medicine more often than the instructions allow. You could get worse or more frequent headaches when the medicine wears off. To prevent migraines  · Keep a headache diary so you can figure out what triggers your headaches. Avoiding triggers may help you prevent headaches. Record when each headache began, how long it lasted, and what the pain was like. (Was it throbbing, aching, stabbing, or dull?) Write down any other symptoms you had with the headache, such as nausea, flashing lights or dark spots, or sensitivity to bright light or loud noise. Note if the headache occurred near your period. List anything that might have triggered the headache. Triggers may include certain foods (chocolate, cheese, wine) or odors, smoke, bright light, stress, or lack of sleep. · If your doctor has prescribed medicine for your migraines, take it as directed. You may have medicine that you take only when you get a migraine and medicine that you take all the time to help prevent migraines. ? If your doctor has prescribed medicine for when you get a headache, take it at the first sign of a migraine, unless your doctor has given you other instructions.   ? If your doctor has prescribed medicine to prevent migraines, take it exactly as prescribed. Call your doctor if you think you are having a problem with your medicine. · Find healthy ways to deal with stress. Migraines are most common during or right after stressful times. Take time to relax before and after you do something that has caused a migraine in the past.  · Try to keep your muscles relaxed by keeping good posture. Check your jaw, face, neck, and shoulder muscles for tension. Try to relax them. When you sit at a desk, change positions often. And make sure to stretch for 30 seconds each hour. · Get plenty of sleep and exercise. · Eat meals on a regular schedule. Avoid foods and drinks that often trigger migraines. These include chocolate, alcohol (especially red wine and port), aspartame, monosodium glutamate (MSG), and some additives found in foods (such as hot dogs, gordon, cold cuts, aged cheeses, and pickled foods). · Limit caffeine. Don't drink too much coffee, tea, or soda. But don't quit caffeine suddenly. That can also give you migraines. · Do not smoke or allow others to smoke around you. If you need help quitting, talk to your doctor about stop-smoking programs and medicines. These can increase your chances of quitting for good. · If you are taking birth control pills or hormone therapy, talk to your doctor about whether they are triggering your migraines. When should you call for help? Call 911 anytime you think you may need emergency care. For example, call if:    · You have signs of a stroke. These may include:  ? Sudden numbness, paralysis, or weakness in your face, arm, or leg, especially on only one side of your body. ? Sudden vision changes. ? Sudden trouble speaking. ? Sudden confusion or trouble understanding simple statements. ? Sudden problems with walking or balance.   ? A sudden, severe headache that is different from past headaches.    Call your doctor now or seek immediate medical care if:    · You have new or worse nausea and vomiting.     · You have a new or higher fever.     · Your headache gets much worse.    Watch closely for changes in your health, and be sure to contact your doctor if:    · You are not getting better after 2 days (48 hours). Where can you learn more? Go to http://eddie-leonardo.info/. Enter X548 in the search box to learn more about \"Migraine Headache: Care Instructions. \"  Current as of: Melva 3, 2018  Content Version: 11.9  © 1611-3485 [employer]. Care instructions adapted under license by [employer] (which disclaims liability or warranty for this information). If you have questions about a medical condition or this instruction, always ask your healthcare professional. [address] any warranty or liability for your use of this information. Well Visit, Ages 25 to 48: Care Instructions  Your Care Instructions    Physical exams can help you stay healthy. Your doctor has checked your overall health and may have suggested ways to take good care of yourself. He or she also may have recommended tests. At home, you can help prevent illness with healthy eating, regular exercise, and other steps. Follow-up care is a key part of your treatment and safety. Be sure to make and go to all appointments, and call your doctor if you are having problems. It's also a good idea to know your test results and keep a list of the medicines you take. How can you care for yourself at home? · Reach and stay at a healthy weight. This will lower your risk for many problems, such as obesity, diabetes, heart disease, and high blood pressure. · Get at least 30 minutes of physical activity on most days of the week. Walking is a good choice. You also may want to do other activities, such as running, swimming, cycling, or playing tennis or team sports. Discuss any changes in your exercise program with your doctor.   · Do not smoke or allow others to smoke around you. If you need help quitting, talk to your doctor about stop-smoking programs and medicines. These can increase your chances of quitting for good. · Talk to your doctor about whether you have any risk factors for sexually transmitted infections (STIs). Having one sex partner (who does not have STIs and does not have sex with anyone else) is a good way to avoid these infections. · Use birth control if you do not want to have children at this time. Talk with your doctor about the choices available and what might be best for you. · Protect your skin from too much sun. When you're outdoors from 10 a.m. to 4 p.m., stay in the shade or cover up with clothing and a hat with a wide brim. Wear sunglasses that block UV rays. Even when it's cloudy, put broad-spectrum sunscreen (SPF 30 or higher) on any exposed skin. · See a dentist one or two times a year for checkups and to have your teeth cleaned. · Wear a seat belt in the car. · Drink alcohol in moderation, if at all. That means no more than 2 drinks a day for men and 1 drink a day for women. Follow your doctor's advice about when to have certain tests. These tests can spot problems early. For everyone  · Cholesterol. Have the fat (cholesterol) in your blood tested after age 21. Your doctor will tell you how often to have this done based on your age, family history, or other things that can increase your risk for heart disease. · Blood pressure. Have your blood pressure checked during a routine doctor visit. Your doctor will tell you how often to check your blood pressure based on your age, your blood pressure results, and other factors. · Vision. Talk with your doctor about how often to have a glaucoma test.  · Diabetes. Ask your doctor whether you should have tests for diabetes. · Colon cancer. Have a test for colon cancer at age 48. You may have one of several tests.  If you are younger than 48, you may need a test earlier if you have any risk factors. Risk factors include whether you already had a precancerous polyp removed from your colon or whether your parent, brother, sister, or child has had colon cancer. For women  · Breast exam and mammogram. Talk to your doctor about when you should have a clinical breast exam and a mammogram. Medical experts differ on whether and how often women under 50 should have these tests. Your doctor can help you decide what is right for you. · Pap test and pelvic exam. Begin Pap tests at age 24. A Pap test is the best way to find cervical cancer. The test often is part of a pelvic exam. Ask how often to have this test.  · Tests for sexually transmitted infections (STIs). Ask whether you should have tests for STIs. You may be at risk if you have sex with more than one person, especially if your partners do not wear condoms. For men  · Tests for sexually transmitted infections (STIs). Ask whether you should have tests for STIs. You may be at risk if you have sex with more than one person, especially if you do not wear a condom. · Testicular cancer exam. Ask your doctor whether you should check your testicles regularly. · Prostate exam. Talk to your doctor about whether you should have a blood test (called a PSA test) for prostate cancer. Experts differ on whether and when men should have this test. Some experts suggest it if you are older than 39 and are -American or have a father or brother who got prostate cancer when he was younger than 72. When should you call for help? Watch closely for changes in your health, and be sure to contact your doctor if you have any problems or symptoms that concern you. Where can you learn more? Go to http://eddie-leonardo.info/. Enter P072 in the search box to learn more about \"Well Visit, Ages 25 to 48: Care Instructions. \"  Current as of: March 28, 2018  Content Version: 11.9  © 4077-4484 [employer].  Care instructions adapted under license by [employer] (which disclaims liability or warranty for this information). If you have questions about a medical condition or this instruction, always ask your healthcare professional. [address] any warranty or liability for your use of this information.

## 2019-05-03 LAB
ALBUMIN SERPL-MCNC: 4.3 G/DL (ref 3.5–5.5)
ALBUMIN/GLOB SERPL: 1.8 {RATIO} (ref 1.2–2.2)
ALP SERPL-CCNC: 44 IU/L (ref 39–117)
ALT SERPL-CCNC: 8 IU/L (ref 0–32)
AST SERPL-CCNC: 12 IU/L (ref 0–40)
BILIRUB SERPL-MCNC: 0.3 MG/DL (ref 0–1.2)
BUN SERPL-MCNC: 11 MG/DL (ref 6–20)
BUN/CREAT SERPL: 13 (ref 9–23)
CALCIUM SERPL-MCNC: 9.7 MG/DL (ref 8.7–10.2)
CHLORIDE SERPL-SCNC: 103 MMOL/L (ref 96–106)
CO2 SERPL-SCNC: 25 MMOL/L (ref 20–29)
CREAT SERPL-MCNC: 0.84 MG/DL (ref 0.57–1)
ERYTHROCYTE [DISTWIDTH] IN BLOOD BY AUTOMATED COUNT: 15.3 % (ref 12.3–15.4)
GLOBULIN SER CALC-MCNC: 2.4 G/DL (ref 1.5–4.5)
GLUCOSE SERPL-MCNC: 77 MG/DL (ref 65–99)
HCT VFR BLD AUTO: 39.7 % (ref 34–46.6)
HGB BLD-MCNC: 12.4 G/DL (ref 11.1–15.9)
MCH RBC QN AUTO: 27.8 PG (ref 26.6–33)
MCHC RBC AUTO-ENTMCNC: 31.2 G/DL (ref 31.5–35.7)
MCV RBC AUTO: 89 FL (ref 79–97)
PLATELET # BLD AUTO: 414 X10E3/UL (ref 150–379)
POTASSIUM SERPL-SCNC: 4.6 MMOL/L (ref 3.5–5.2)
PROT SERPL-MCNC: 6.7 G/DL (ref 6–8.5)
RBC # BLD AUTO: 4.46 X10E6/UL (ref 3.77–5.28)
SODIUM SERPL-SCNC: 139 MMOL/L (ref 134–144)
WBC # BLD AUTO: 7.4 X10E3/UL (ref 3.4–10.8)

## 2019-05-03 NOTE — PROGRESS NOTES
Establish Care       Subjective:   HPI     33year old Mr. Prince Nelson presents to establish care. She gives hx of controlled asthma and migraine headaches. She takes excedrine migraine without relief. She reports the following history and medical concerns:        Past Medical History:   Diagnosis Date    ADHD (attention deficit hyperactivity disorder)     Asthma     Headache        History reviewed. No pertinent surgical history. Prior to Admission medications    Medication Sig Start Date End Date Taking? Authorizing Provider   SUMAtriptan (IMITREX) 50 mg tablet Take 1 Tab by mouth once as needed for Migraine for up to 1 dose. 5/2/19 5/2/19 Yes Manda Hoffmann NP   albuterol (PROVENTIL HFA, VENTOLIN HFA, PROAIR HFA) 90 mcg/actuation inhaler Take 2 Puffs by inhalation every four (4) hours as needed for Wheezing. 7/26/18  Yes MEET Adames         No Known Allergies     Social History     Socioeconomic History    Marital status: SINGLE     Spouse name: Not on file    Number of children: Not on file    Years of education: Not on file    Highest education level: Not on file   Occupational History    Not on file   Social Needs    Financial resource strain: Not on file    Food insecurity:     Worry: Not on file     Inability: Not on file    Transportation needs:     Medical: Not on file     Non-medical: Not on file   Tobacco Use    Smoking status: Current Every Day Smoker     Packs/day: 0.50    Smokeless tobacco: Never Used   Substance and Sexual Activity    Alcohol use: Yes    Drug use: Yes     Types: Marijuana, Other, Heroin     Comment: Bianca, heroin use in the last few month.  no rehab    Sexual activity: Yes     Partners: Female   Lifestyle    Physical activity:     Days per week: Not on file     Minutes per session: Not on file    Stress: Not on file   Relationships    Social connections:     Talks on phone: Not on file     Gets together: Not on file     Attends Confucianism service: Not on file     Active member of club or organization: Not on file     Attends meetings of clubs or organizations: Not on file     Relationship status: Not on file    Intimate partner violence:     Fear of current or ex partner: Not on file     Emotionally abused: Not on file     Physically abused: Not on file     Forced sexual activity: Not on file   Other Topics Concern    Not on file   Social History Narrative    ** Merged History Encounter **         ** Merged History Encounter **         Live with her girlfriend and sister. Was on disability but recently lost her disability. No legal problem reported. Started seeing psychiatrist- age 7 behavior problem. Reports recent psych hospitalization at [address] [place] [address] for possible OD/drug abuse    No abuse reported. Family History   Problem Relation Age of Onset   Maximos.Maze Asthma Mother     Hypertension Mother    Maximos.Maze Asthma Sister     Hypertension Sister     Asthma Brother     Hypertension Brother     Depression Brother         Review of Systems   Constitutional: Negative for chills, diaphoresis, fever, malaise/fatigue and weight loss. HENT: Negative for congestion, ear discharge, ear pain, hearing loss, nosebleeds and tinnitus. Eyes: Negative for blurred vision, double vision, photophobia, pain, discharge and redness. Respiratory: Negative for cough, shortness of breath and wheezing. Cardiovascular: Negative for chest pain and palpitations. Gastrointestinal: Negative for abdominal pain, constipation, diarrhea, heartburn, nausea and vomiting. Genitourinary: Negative for dysuria. Musculoskeletal: Negative for myalgias. Skin: Negative for itching and rash. Neurological: Positive for headaches. Negative for dizziness, tingling, tremors, sensory change, speech change, seizures and weakness. Temples start to throb prior to moderate severity headaches. Endo/Heme/Allergies: Negative for polydipsia. Does not bruise/bleed easily. Psychiatric/Behavioral: Negative for depression. Assessment/ Plan:     Vitals:    05/02/19 0837   BP: 100/58   Pulse: 93   Resp: 16   Temp: 98.3 °F (36.8 °C)   TempSrc: Oral   SpO2: 97%   Weight: 127 lb (57.6 kg)   Height: 5' 7\" (1.702 m)   PainSc:   0 - No pain        Physical Exam   Constitutional: She is oriented to person, place, and time. She appears well-nourished. HENT:   Head: Normocephalic and atraumatic. Right Ear: External ear normal.   Left Ear: External ear normal.   Nose: Nose normal.   Mouth/Throat: Oropharynx is clear and moist. No oropharyngeal exudate. Eyes: Pupils are equal, round, and reactive to light. Conjunctivae are normal.   Neck: Normal range of motion. Neck supple. No thyromegaly present. Cardiovascular: Normal rate, regular rhythm and normal heart sounds. Pulmonary/Chest: Effort normal and breath sounds normal. No respiratory distress. She has no wheezes. She has no rales. She exhibits no tenderness. Abdominal: Soft. Bowel sounds are normal. She exhibits no distension. There is no tenderness. Musculoskeletal: Normal range of motion. Lymphadenopathy:     She has no cervical adenopathy. Neurological: She is alert and oriented to person, place, and time. She displays normal reflexes. No cranial nerve deficit or sensory deficit. She exhibits normal muscle tone. Coordination normal.   Skin: Skin is warm and dry. No rash noted. No erythema. Psychiatric: She has a normal mood and affect. Nursing note and vitals reviewed. ICD-10-CM ICD-9-CM    1. Encounter for medical examination to establish care Z00.00 V70.9 AMB POC LIPID PROFILE      AMB POC HEMOGLOBIN A1C      AMB POC URINALYSIS DIP STICK MANUAL W/O MICRO      CBC W/O DIFF      METABOLIC PANEL, COMPREHENSIVE   2. Mild intermittent asthma without complication I24.22 366.27    3. Screening for diabetes mellitus Z13.1 V77.1 AMB POC HEMOGLOBIN A1C   4.  Screening for cholesterol level Z13.220 V77.91 AMB POC LIPID PROFILE   5. Migraine with aura and without status migrainosus, not intractable G43.109 346.00         Orders Placed This Encounter    CBC W/O DIFF    METABOLIC PANEL, COMPREHENSIVE    AMB POC LIPID PROFILE    AMB POC HEMOGLOBIN A1C    AMB POC URINALYSIS DIP STICK MANUAL W/O MICRO    SUMAtriptan (IMITREX) 50 mg tablet     Sig: Take 1 Tab by mouth once as needed for Migraine for up to 1 dose. Dispense:  30 Tab     Refill:  1        Assessment/ Plan:     I have reviewed the patient's medical history in detail and updated the computerized patient record. Reviewed and discussed POC HbgA1C  And lipid profile test results which were wnl. Migraine headaches:  Imitrex. Discussed use, side effects, and s/s allergic reaction. We had a prolonged discussion about these complex clinical issues and went over the various important aspects to consider. All questions were answered. Advised her to call back or return to office if symptoms do not improve, change in nature, or persist.  RTO in 2 wks to discuss results; med eval for migraine headaches. She was given an after visit summary or informed of [employer] [facility] which includes patient instructions, diagnoses, current medications, & vitals. She expressed understanding with the diagnosis and plan and was given the opportunity to ask questions.      Luz Maria Julien, DNP

## 2019-10-25 NOTE — BH NOTES
Behavioral Health Transition Record to Provider    Patient Name: Kat Kwon  YOB: 1990  Medical Record Number: [id number]  Date of Admission: 10/26/2017  Date of Discharge: 10/30/2017     Attending Provider: Anne Marie Albright MD  Discharging Provider: Anne Marie Albright MD  To contact this individual call [phone]  and ask the  to page. If unavailable, ask to be transferred to [facility] Provider on call. A Behavioral Health Provider will be available on call 24/7 and during holidays     Primary Care Provider: PROVIDER UNKNOWN    No Known Allergies       H&P Summary Notes      H&P by Anne Marie Albright MD at 10/27/17 1750     Author:  Anne Marie Albright MD Service:  PSYCHIATRY Author Type:  Physician    Filed:  10/27/17 1802 Date of Service:  10/27/17 1750 Status:  Signed    :  Anne Marie Albright MD (Physician)             INITIAL PSYCHIATRIC EVALUATION            IDENTIFICATION:    Patient Name[PK1.1]  Jagjit Galarza[PK1.2]   Date of Birth[PK1.1] 1990[PK1.2]   [facility][PK1.1] [id number][PK1.2]   Medical Record Number[PK1.1]  [id number][DF5.1]      Age[PK1.1]  23 y. o.[PK1.2]   PCP[PK1.1] PROVIDER UNKNOWN[PK1.2]   Admit date:[PK1.1]  10/26/2017[PK1.2]    Room Number[PK1.1]  323/01[PK1.2]  @[PK1.1] [facility][PK1.2]   Date of Service[PK1.1]  10/27/2017[PK1.2]            HISTORY         REASON FOR HOSPITALIZATION:  CC: \"feeling sick\". Pt admitted under a voluntary basis for polysubstance abuse, suicidal ideation proving to be an imminent danger to self . HISTORY OF PRESENT ILLNESS:    The patient,[PK1.1] Chon Galarza[PK1.2], is a[PK1.1] 23 y.o. BLACK OR  female[PK1.2] with a past psychiatric history significant for polysubstance use, who presents at this time with complaints of (and/or evidence of) the following emotional symptoms: suicidal thoughts/threats.   Additional symptomatology include regular heroin abuse, katy use ( +cocaine and THC), mild w/d sx- nausea, cramps, anxiety. pt is preoccupied with her stressor and felt worthless and hopeless. Currently feel safe in the hospital. Denies alessandra or psychosis. The above symptoms have been present for 1 day. These symptoms are of moderate severity. These symptoms are intermittent/ fleeting in nature. The patient's condition has been precipitated by and psychosocial stressors (financial- lost disability, drug use ). Patient's condition made worse by continued illicit drug use and alcohol use as well as treatment noncompliance. UDS: +MJ, cocaine and opiate; BAL=0. Per initial screening \"Patient is a 23year old female seen face to face in the ER. She reported she is \"depressed. I'm going crazy. \"  She endorsed suicidal ideation and stated her plan is to just keep taking pills until she dies. She reports a history of prior attempts via overdose. She was tearful and her affect was flat. She reported she has thought about hurting people because \"I just want to go,\" but has no specific plan. She is not eating and is not sleeping. She is abusing a variety drugs, primarily heroin. Besides what she tested positive for she also admitted to using katy. She reported she began when she lost her disability and Medicaid and could no longer get her psychotropic medications. She reported she used to take an antidepressant and Adderal.  She and her girlfriend are staying with her sister's family. Since they do not have a fixed address she did not receive her disability renewal paperwork and it was cut off. She did go to [facility] but was discouraged by the process to get help. Her girlfriend who brought her in reported that she is concerned about her ability to remain safe. She will assist her with following up at [facility] once she is stabilized.   Patient is willing to be admitted voluntarily\"     ALLERGIES:[PK1.1] No Known Allergies[PK1.2]   MEDICATIONS PRIOR TO ADMISSION:[PK1.1] Prescriptions Prior to Admission   Medication Sig    albuterol (PROAIR HFA) 90 mcg/actuation inhaler Take 2 Puffs by inhalation every six (6) hours as needed for Wheezing.[PK1.2]      PAST MEDICAL HISTORY:[PK1.1]   Past Medical History:   Diagnosis Date    ADHD (attention deficit hyperactivity disorder)     Asthma    History reviewed. No pertinent surgical history.[PK1.2]   SOCIAL HISTORY:[PK1.1]    Social History     Social History    Marital status: SINGLE     Spouse name: N/A    Number of children: N/A    Years of education: N/A     Occupational History    Not on file. Social History Main Topics    Smoking status: Current Every Day Smoker     Packs/day: 1.00    Smokeless tobacco: Never Used    Alcohol use Yes    Drug use: Yes     Special: Marijuana, Other, Heroin      Comment: Bianca, heroin use in the last few month. no rehab    Sexual activity: Yes     Partners: Female     Other Topics Concern    Not on file     Social History Narrative    ** Merged History Encounter **         ** Merged History Encounter **         Live with her girlfriend and sister. Was on disability but recently lost her disability. No legal problem reported. Started seeing psychiatrist- age 7 behavior problem. Reports recent psych hospitalization at [address] [facility] for possible OD/drug abuse    No abuse reported.[PK1.2]      FAMILY HISTORY: History reviewed. No pertinent family history.[PK1.1]   Family History   Problem Relation Age of Onset    Asthma Mother     Hypertension Mother    Kulwinder Samy Asthma Sister     Hypertension Sister     Asthma Brother     Hypertension Brother     Depression Brother[PK1.2]        REVIEW OF SYSTEMS:   Psychological ROS: positive for - anxiety, behavioral disorder, irritability and suicidal ideation  Pertinent items are noted in the History of Present Illness. All other Systems reviewed and are considered negative.            MENTAL STATUS EXAM & VITALS     MENTAL STATUS EXAM (MSE): MSE FINDINGS ARE WITHIN NORMAL LIMITS (WNL) UNLESS OTHERWISE STATED BELOW. ( ALL OF THE BELOW CATEGORIES OF THE MSE HAVE BEEN REVIEWED (reviewed[PK1.1] 10/27/2017[PK1.2]) AND UPDATED AS DEEMED APPROPRIATE )  General Presentation age appropriate and disheveled, unreliable and vague   Orientation oriented to time, place and person   Vital Signs  See below (reviewed[PK1.1] 10/27/2017[PK1.2]); Vital Signs (BP, Pulse, & Temp) are within normal limits if not listed below.    Gait and Station Stable/steady, no ataxia   Musculoskeletal System No extrapyramidal symptoms (EPS); no abnormal muscular movements or Tardive Dyskinesia (TD); muscle strength and tone are within normal limits   Language No aphasia or dysarthria   Speech:  hypoverbal   Thought Processes logical; slow rate of thoughts; fair abstract reasoning/computation   Thought Associations goal directed   Thought Content free of delusions, free of hallucinations and preoccupations   Suicidal Ideations none and contracts for safety   Homicidal Ideations none   Mood:  anxious  and sad   Affect:  anxious, mood-congruent and sad   Memory recent  intact   Memory remote:  intact   Concentration/Attention:  intact   Fund of Knowledge average   Insight:  limited   Reliability poor   Judgment:  poor          VITALS:[PK1.1]     Patient Vitals for the past 24 hrs:   Temp Pulse Resp BP SpO2   10/27/17 0554 97 °F (36.1 °C) 71 16 101/62 -   10/26/17 2302 99.5 °F (37.5 °C) 86 18 116/80 -   10/26/17 2230 98.4 °F (36.9 °C) 78 18 127/82 99 %   10/26/17 1822 98.7 °F (37.1 °C) 81 18 139/86 100 %     Wt Readings from Last 3 Encounters:   10/26/17 56.7 kg (125 lb)   09/26/17 54.9 kg (121 lb)   08/26/16 59 kg (130 lb)     Temp Readings from Last 3 Encounters:   10/27/17 97 °F (36.1 °C)   09/26/17 98.2 °F (36.8 °C)   04/15/17 97.3 °F (36.3 °C)     BP Readings from Last 3 Encounters:   10/27/17 101/62   09/26/17 109/66   04/15/17 119/54     Pulse Readings from Last 3 Encounters: 10/27/17 71   09/26/17 96   04/15/17 (!) 126[PK1.2]            DATA     LABORATORY DATA:[PK1.1]  Labs Reviewed   DRUG SCREEN, URINE - Abnormal; Notable for the following:        Result Value    COCAINE POSITIVE (*)     OPIATES POSITIVE (*)     THC (TH-CANNABINOL) POSITIVE (*)     All other components within normal limits   URINALYSIS W/ REFLEX CULTURE - Abnormal; Notable for the following:     Appearance CLOUDY (*)     Epithelial cells MANY (*)     Bacteria 3+ (*)     UA:UC IF INDICATED URINE CULTURE ORDERED (*)     Mucus 3+ (*)     All other components within normal limits   CBC WITH AUTOMATED DIFF - Abnormal; Notable for the following:     WBC 12.1 (*)     PLATELET 050 (*)     ABS.  LYMPHOCYTES 4.6 (*)     All other components within normal limits   METABOLIC PANEL, BASIC - Abnormal; Notable for the following:     Creatinine 1.12 (*)     GFR est non-AA 59 (*)     All other components within normal limits   CULTURE, URINE   ETHYL ALCOHOL   CBC WITH AUTOMATED DIFF   METABOLIC PANEL, BASIC   ETHYL ALCOHOL   HCG URINE, QL. - POC   POC URINE PREGNANCY TEST     Admission on 10/26/2017   Component Date Value Ref Range Status    AMPHETAMINES 10/26/2017 NEGATIVE   NEG   Final    BARBITURATES 10/26/2017 NEGATIVE   NEG   Final    BENZODIAZEPINES 10/26/2017 NEGATIVE   NEG   Final    COCAINE 10/26/2017 POSITIVE* NEG   Final    METHADONE 10/26/2017 NEGATIVE   NEG   Final    OPIATES 10/26/2017 POSITIVE* NEG   Final    PCP(PHENCYCLIDINE) 10/26/2017 NEGATIVE   NEG   Final    THC (TH-CANNABINOL) 10/26/2017 POSITIVE* NEG   Final    Drug screen comment 10/26/2017 (NOTE)   Final    Color 10/26/2017 YELLOW/STRAW    Final    Appearance 10/26/2017 CLOUDY* CLEAR   Final    Specific gravity 10/26/2017 1.020  1.003 - 1.030   Final    pH (UA) 10/26/2017 6.0  5.0 - 8.0   Final    Protein 10/26/2017 NEGATIVE   NEG mg/dL Final    Glucose 10/26/2017 NEGATIVE   NEG mg/dL Final    Ketone 10/26/2017 NEGATIVE   NEG mg/dL Final    Bilirubin 10/26/2017 NEGATIVE   NEG   Final    Blood 10/26/2017 NEGATIVE   NEG   Final    Urobilinogen 10/26/2017 0.2  0.2 - 1.0 EU/dL Final    Nitrites 10/26/2017 NEGATIVE   NEG   Final    Leukocyte Esterase 10/26/2017 NEGATIVE   NEG   Final    WBC 10/26/2017 0-4  0 - 4 /hpf Final    RBC 10/26/2017 0-5  0 - 5 /hpf Final    Epithelial cells 10/26/2017 MANY* FEW /lpf Final    Bacteria 10/26/2017 3+* NEG /hpf Final    UA:UC IF INDICATED 10/26/2017 URINE CULTURE ORDERED* CNI   Final    Mucus 10/26/2017 3+* NEG /lpf Final    ALCOHOL(ETHYL),SERUM 10/26/2017 <10  <10 MG/DL Final    WBC 10/26/2017 12.1* 3.6 - 11.0 K/uL Final    RBC 10/26/2017 4.99  3.80 - 5.20 M/uL Final    HGB 10/26/2017 14.1  11.5 - 16.0 g/dL Final    HCT 10/26/2017 43.6  35.0 - 47.0 % Final    MCV 10/26/2017 87.4  80.0 - 99.0 FL Final    MCH 10/26/2017 28.3  26.0 - 34.0 PG Final    MCHC 10/26/2017 32.3  30.0 - 36.5 g/dL Final    RDW 10/26/2017 13.7  11.5 - 14.5 % Final    PLATELET 66/62/9589 896* 150 - 400 K/uL Final    NEUTROPHILS 10/26/2017 53  32 - 75 % Final    LYMPHOCYTES 10/26/2017 38  12 - 49 % Final    MONOCYTES 10/26/2017 8  5 - 13 % Final    EOSINOPHILS 10/26/2017 1  0 - 7 % Final    BASOPHILS 10/26/2017 0  0 - 1 % Final    ABS. NEUTROPHILS 10/26/2017 6.4  1.8 - 8.0 K/UL Final    ABS. LYMPHOCYTES 10/26/2017 4.6* 0.8 - 3.5 K/UL Final    ABS. MONOCYTES 10/26/2017 1.0  0.0 - 1.0 K/UL Final    ABS. EOSINOPHILS 10/26/2017 0.1  0.0 - 0.4 K/UL Final    ABS.  BASOPHILS 10/26/2017 0.0  0.0 - 0.1 K/UL Final    Sodium 10/26/2017 141  136 - 145 mmol/L Final    Potassium 10/26/2017 4.2  3.5 - 5.1 mmol/L Final    Chloride 10/26/2017 104  97 - 108 mmol/L Final    CO2 10/26/2017 28  21 - 32 mmol/L Final    Anion gap 10/26/2017 9  5 - 15 mmol/L Final    Glucose 10/26/2017 86  65 - 100 mg/dL Final    BUN 10/26/2017 15  6 - 20 MG/DL Final    Creatinine 10/26/2017 1.12* 0.55 - 1.02 MG/DL Final    BUN/Creatinine ratio 10/26/2017 13  12 - 20   Final    GFR est AA 10/26/2017 >60  >60 ml/min/1.73m2 Final    GFR est non-AA 10/26/2017 59* >60 ml/min/1.73m2 Final    Calcium 10/26/2017 9.3  8.5 - 10.1 MG/DL Final    Pregnancy test,urine (POC) 10/26/2017 NEGATIVE   NEG   Final[PK1.2]        RADIOLOGY REPORTS:[PK1.1]    Results from Hospital Encounter encounter on 04/15/17   XR CHEST PA LAT   Narrative EXAM:  XR CHEST PA LAT    INDICATION:  SOB, Hx of asthma    COMPARISON: 2016. FINDINGS: A single view of the chest demonstrates clear lungs. The cardiac and  mediastinal contours and pulmonary vascularity are normal.  The bones and soft  tissues are within normal limits. Impression IMPRESSION: No acute abnormality identified.[PK1.2]            No results found.            MEDICATIONS       ALL MEDICATIONS[PK1.1]  Current Facility-Administered Medications   Medication Dose Route Frequency    albuterol (PROVENTIL HFA, VENTOLIN HFA, PROAIR HFA) inhaler 2 Puff  2 Puff Inhalation Q4H PRN    ziprasidone (GEODON) 20 mg in sterile water (preservative free) 1 mL injection  20 mg IntraMUSCular BID PRN    OLANZapine (ZyPREXA) tablet 5 mg  5 mg Oral Q6H PRN    benztropine (COGENTIN) tablet 2 mg  2 mg Oral BID PRN    benztropine (COGENTIN) injection 2 mg  2 mg IntraMUSCular BID PRN    LORazepam (ATIVAN) injection 2 mg  2 mg IntraMUSCular Q4H PRN    LORazepam (ATIVAN) tablet 1 mg  1 mg Oral Q4H PRN    zolpidem (AMBIEN) tablet 10 mg  10 mg Oral QHS PRN    acetaminophen (TYLENOL) tablet 650 mg  650 mg Oral Q4H PRN    ibuprofen (MOTRIN) tablet 400 mg  400 mg Oral Q8H PRN    magnesium hydroxide (MILK OF MAGNESIA) 400 mg/5 mL oral suspension 30 mL  30 mL Oral DAILY PRN    nicotine (NICODERM CQ) 21 mg/24 hr patch 1 Patch  1 Patch TransDERmal DAILY PRN    promethazine (PHENERGAN) tablet 25 mg  25 mg Oral Q6H PRN    loperamide (IMODIUM) capsule 2 mg  2 mg Oral PRN    dicyclomine (BENTYL) 10 mg/mL injection 20 mg  20 mg IntraMUSCular Q6H PRN    methadone (DOLOPHINE) tablet 15 mg  15 mg Oral DAILY    cloNIDine HCl (CATAPRES) tablet 0.1 mg  0.1 mg Oral Q4H PRN[PK1.2]      SCHEDULED MEDICATIONS[PK1.1]  Current Facility-Administered Medications   Medication Dose Route Frequency    methadone (DOLOPHINE) tablet 15 mg  15 mg Oral DAILY[PK1.2]                ASSESSMENT & PLAN        The patient,[PK1.1] Chon Galarza[PK1.2], is a[PK1.1] 23 y.o.  female[PK1.2] who presents at this time for treatment of the following diagnoses:  Patient Active Hospital Problem List:   Substance induced mood disorder ([facility] (10/26/2017)    Assessment: sig drug use- depressed mood with vague SI    Plan: detox protocol, rehab- need period of sobriety to assess need for AD- pt states she was taking Adderall and AD but  reviewed by pharmacist suggest no med refill for >1 year. Polysubstance dependence ([facility] (10/27/2017)    Assessment: heroin use- increased over the ast few month- daily, having mild w/d - tend to minimize and vague about use of other drugs- cocaine, THC and Bianca. No rehab    Plan: detox with Methadone- received dose today, cows, rehab recommended           A coordinated, multidisplinary treatment team (includes the nurse, unit pharmcist,  and writer) round was conducted for this initial evaluation with the patient present. The following regarding medications was addressed during rounds with patient:   the risks and benefits of the proposed medication. The patient was given the opportunity to ask questions. Informed consent given to the use of the above medications. I will continue to adjust psychiatric and non-psychiatric medications (see above \"medication\" section and orders section for details) as deemed appropriate & based upon diagnoses and response to treatment. I have reviewed admission (and previous/old) labs and medical tests in the EHR and or transferring hospital documents.  I will continue to order blood tests/labs and diagnostic tests as deemed appropriate and review results as they become available (see orders for details). I have reviewed old psychiatric and medical records available in the EHR. I Will order additional psychiatric records from other institutions to further elucidate the nature of patient's psychopathology and review once available. I will gather additional collateral information from friends, family and o/p treatment team to further elucidate the nature of patient's psychopathology and baselline level of psychiatric functioning.       ESTIMATED LENGTH OF STAY:    3 days       STRENGTHS:  Exercising self-direction/Resourceful and Awareness of Substance abuse issues                                        SIGNED:[PK1.1]    Karina Bucio MD  10/27/2017[PK1.2]       Revision History       User Key Date/Time User Provider Type Action    > PK1.2 10/27/17 1802 Karina Bucio MD Physician Sign     PK1.1 10/27/17 1750 Karina Bucio MD Physician               Admission Diagnosis: Substance induced mood disorder ([facility]    * No surgery found *    Results for orders placed or performed during the hospital encounter of 10/26/17   CULTURE, URINE   Result Value Ref Range    Special Requests: NO SPECIAL REQUESTS  Reflexed from [id number]        [place] Count [id number]  COLONIES/mL        Culture result: MIXED UROGENITAL ZACK ISOLATED      Culture result: (A)      INCLUDING 2,000 COLONIES/mL OF STREPTOCOCCI, BETA HEMOLYTIC GROUP B   DRUG SCREEN, URINE   Result Value Ref Range    AMPHETAMINES NEGATIVE  NEG      BARBITURATES NEGATIVE  NEG      BENZODIAZEPINES NEGATIVE  NEG      COCAINE POSITIVE (A) NEG      METHADONE NEGATIVE  NEG      OPIATES POSITIVE (A) NEG      PCP(PHENCYCLIDINE) NEGATIVE  NEG      THC (TH-CANNABINOL) POSITIVE (A) NEG      Drug screen comment (NOTE)    URINALYSIS W/ REFLEX CULTURE   Result Value Ref Range    Color YELLOW/STRAW      Appearance CLOUDY (A) CLEAR      Specific gravity 1.020 1.003 - 1.030      pH (UA) 6.0 5.0 - 8.0      Protein NEGATIVE  NEG mg/dL    Glucose NEGATIVE  NEG mg/dL    Ketone NEGATIVE  NEG mg/dL    Bilirubin NEGATIVE  NEG      Blood NEGATIVE  NEG      Urobilinogen 0.2 0.2 - 1.0 EU/dL    Nitrites NEGATIVE  NEG      Leukocyte Esterase NEGATIVE  NEG      WBC 0-4 0 - 4 /hpf    RBC 0-5 0 - 5 /hpf    Epithelial cells MANY (A) FEW /lpf    Bacteria 3+ (A) NEG /hpf    UA:UC IF INDICATED URINE CULTURE ORDERED (A) CNI      Mucus 3+ (A) NEG /lpf   ETHYL ALCOHOL   Result Value Ref Range    ALCOHOL(ETHYL),SERUM <10 <10 MG/DL   CBC WITH AUTOMATED DIFF   Result Value Ref Range    WBC 12.1 (H) 3.6 - 11.0 K/uL    RBC 4.99 3.80 - 5.20 M/uL    HGB 14.1 11.5 - 16.0 g/dL    HCT 43.6 35.0 - 47.0 %    MCV 87.4 80.0 - 99.0 FL    MCH 28.3 26.0 - 34.0 PG    MCHC 32.3 30.0 - 36.5 g/dL    RDW 13.7 11.5 - 14.5 %    PLATELET 815 (H) 955 - 400 K/uL    NEUTROPHILS 53 32 - 75 %    LYMPHOCYTES 38 12 - 49 %    MONOCYTES 8 5 - 13 %    EOSINOPHILS 1 0 - 7 %    BASOPHILS 0 0 - 1 %    ABS. NEUTROPHILS 6.4 1.8 - 8.0 K/UL    ABS. LYMPHOCYTES 4.6 (H) 0.8 - 3.5 K/UL    ABS. MONOCYTES 1.0 0.0 - 1.0 K/UL    ABS. EOSINOPHILS 0.1 0.0 - 0.4 K/UL    ABS.  BASOPHILS 0.0 0.0 - 0.1 K/UL   METABOLIC PANEL, BASIC   Result Value Ref Range    Sodium 141 136 - 145 mmol/L    Potassium 4.2 3.5 - 5.1 mmol/L    Chloride 104 97 - 108 mmol/L    CO2 28 21 - 32 mmol/L    Anion gap 9 5 - 15 mmol/L    Glucose 86 65 - 100 mg/dL    BUN 15 6 - 20 MG/DL    Creatinine 1.12 (H) 0.55 - 1.02 MG/DL    BUN/Creatinine ratio 13 12 - 20      GFR est AA >60 >60 ml/min/1.73m2    GFR est non-AA 59 (L) >60 ml/min/1.73m2    Calcium 9.3 8.5 - 10.1 MG/DL   HCG URINE, QL. - POC   Result Value Ref Range    Pregnancy test,urine (POC) NEGATIVE  NEG         Immunizations administered during this encounter:   Immunization History   Administered Date(s) Administered    Influenza Vaccine (Quad) PF 10/27/2017       Screening for Metabolic Disorders for Patients on Wound Care: [place] Antipsychotic Medications  (Data obtained from the EMR)    Estimated Body Mass Index  Estimated body mass index is 20.18 kg/(m^2) as calculated from the following:    Height as of this encounter: 5' 6\" (1.676 m). Weight as of this encounter: 56.7 kg (125 lb). Vital Signs/Blood Pressure  Visit Vitals    /82    Pulse 79    Temp 97.7 °F (36.5 °C)    Resp 18    Ht 5' 6\" (1.676 m)    Wt 56.7 kg (125 lb)    SpO2 99%    Breastfeeding No    BMI 20.18 kg/m2       Blood Glucose/Hemoglobin A1c  Lab Results   Component Value Date/Time    Glucose 86 10/26/2017 06:55 PM    Glucose (POC) 98 08/26/2016 09:29 PM        No results found for: HBA1C, HGBE8, [id number]     Lipid Panel  No results found for: CHOL, CHOLX, CHLST, CHOLV, 921624, HDL, LDL, LDLC, DLDLP, TGLX, TRIGL, TRIGP, CHHD, CHHDX    Discharge Diagnosis: Substance induced mood disorder     Discharge Plan: The pt will return home with her girlfriend. Pt plans to go to substance abuse treatment. Discharge Medication List and Instructions:   Current Discharge Medication List      CONTINUE these medications which have NOT CHANGED    Details   albuterol (PROAIR HFA) 90 mcg/actuation inhaler Take 2 Puffs by inhalation every six (6) hours as needed for Wheezing. Qty: 1 Inhaler, Refills: 0             Unresulted Labs     None        To obtain results of studies pending at discharge, please contact N/A     Follow-up Information     Follow up With Details Comments Contact Info    Provider Unknown   Patient not available to ask      [facility]  Walk-in [id number]/17 @ 8:00am-11:00AM @ 12:00PM-3:00PM for substance abuse intake  [address] [facility]  [phone]          Advanced Directive:   Does the patient have an appointed surrogate decision maker? No  Does the patient have a Medical Advance Directive? No  Does the patient have a Psychiatric Advance Directive?  No  If the patient does not have a surrogate or Curette Text (Optional): After the contents were expressed a curette was used to partially remove the cyst wall. Detail Level: Simple Medical Advance Directive AND Psychiatric Advance Directive, the patient was offered information on these advance directives Patient will complete at a later time      Patient Instructions: Please continue all medications until otherwise directed by physician. N/A    Tobacco Cessation Discharge Plan:   Is the patient a smoker and needs referral for smoking cessation? No  Patient referred to the following for smoking cessation with an appointment? Not applicable     Patient was offered medication to assist with smoking cessation at discharge? Not applicable  Was education for smoking cessation added to the discharge instructions? Not applicable    Alcohol/Substance Abuse Discharge Plan:   Does the patient have a history of substance/alcohol abuse and requires a referral for treatment? Yes  Patient referred to the following for substance/alcohol abuse treatment with an appointment? Yes  Patient was offered medication to assist with alcohol cessation at discharge? No  Was education for substance/alcohol abuse added to discharge instructions? Yes    Patient discharged to Home; provided to the patient/caregiver either in hard copy or electronically. Pt is alert and oriented. Pt denies SI/HI. Pt is free of delusions. Pt's thought process was logical. Pt's insight and judgment was fair. Pt is in agreement with the plan. Size Of Lesion In Cm (Optional But May Be Required For Some Insurances): 0 Include Sutures?: No Suture Text: The incision was partially closed with Epidermal Closure: simple interrupted Body Location Override (Optional - Billing Will Still Be Based On Selected Body Map Location If Applicable): right upper back Epidermal Sutures: 4-0 Ethilon Consent was obtained and risks were reviewed including but not limited to delayed wound healing, infection, need for multiple I and D's, and pain. Preparation Text: The area was prepped in the usual clean fashion. Method: 11 blade Lesion Type: Abscess Post-Care Instructions: I reviewed with the patient in detail post-care instructions. Patient should keep wound covered and call the office should any redness, pain, swelling or worsening occur. Anesthesia Volume In Cc: 1 Dressing: pressure dressing with telfa Anesthesia Type: 1% lidocaine without epinephrine

## 2020-03-15 NOTE — BH NOTES
GROUP THERAPY PROGRESS NOTE    The patient Elham matias 23 y.o. female is participating in Coping Skills Group. Group time: 45 minutes    Personal goal for participation: To participate in mental health [employer] game    Goal orientation:  personal    Group therapy participation: active    Therapeutic interventions reviewed and discussed: choices in recovery    Impression of participation:  The patient was attentive.     Crystal Billingsley  10/27/2017  1:52 PM 84

## 2020-08-28 ENCOUNTER — APPOINTMENT (OUTPATIENT)
Dept: GENERAL RADIOLOGY | Age: 30
End: 2020-08-28
Attending: EMERGENCY MEDICINE
Payer: MEDICAID

## 2020-08-28 ENCOUNTER — HOSPITAL ENCOUNTER (EMERGENCY)
Age: 30
Discharge: HOME | End: 2020-08-28
Attending: EMERGENCY MEDICINE
Payer: MEDICAID

## 2020-08-28 VITALS
HEART RATE: 98 BPM | RESPIRATION RATE: 14 BRPM | BODY MASS INDEX: 22.82 KG/M2 | DIASTOLIC BLOOD PRESSURE: 59 MMHG | OXYGEN SATURATION: 97 % | WEIGHT: 137 LBS | SYSTOLIC BLOOD PRESSURE: 111 MMHG | HEIGHT: 65 IN | TEMPERATURE: 98.3 F

## 2020-08-28 DIAGNOSIS — K59.03 DRUG-INDUCED CONSTIPATION: Primary | ICD-10-CM

## 2020-08-28 PROCEDURE — 99283 EMERGENCY DEPT VISIT LOW MDM: CPT

## 2020-08-28 PROCEDURE — 74011250637 HC RX REV CODE- 250/637: Performed by: EMERGENCY MEDICINE

## 2020-08-28 PROCEDURE — 74011250636 HC RX REV CODE- 250/636: Performed by: EMERGENCY MEDICINE

## 2020-08-28 PROCEDURE — 96372 THER/PROPH/DIAG INJ SC/IM: CPT

## 2020-08-28 PROCEDURE — 74019 RADEX ABDOMEN 2 VIEWS: CPT

## 2020-08-28 RX ORDER — MAGNESIUM CITRATE
296 SOLUTION, ORAL ORAL
Status: COMPLETED | OUTPATIENT
Start: 2020-08-28 | End: 2020-08-28

## 2020-08-28 RX ORDER — KETOROLAC TROMETHAMINE 30 MG/ML
30 INJECTION, SOLUTION INTRAMUSCULAR; INTRAVENOUS
Status: COMPLETED | OUTPATIENT
Start: 2020-08-28 | End: 2020-08-28

## 2020-08-28 RX ORDER — ONDANSETRON 4 MG/1
8 TABLET, ORALLY DISINTEGRATING ORAL
Status: COMPLETED | OUTPATIENT
Start: 2020-08-28 | End: 2020-08-28

## 2020-08-28 RX ORDER — DOCUSATE SODIUM 100 MG/1
100 CAPSULE, LIQUID FILLED ORAL ONCE
Status: COMPLETED | OUTPATIENT
Start: 2020-08-28 | End: 2020-08-28

## 2020-08-28 RX ADMIN — KETOROLAC TROMETHAMINE 30 MG: 30 INJECTION, SOLUTION INTRAMUSCULAR; INTRAVENOUS at 22:53

## 2020-08-28 RX ADMIN — DOCUSATE SODIUM 100 MG: 100 CAPSULE, LIQUID FILLED ORAL at 22:53

## 2020-08-28 RX ADMIN — Medication 296 ML: at 22:53

## 2020-08-28 RX ADMIN — ONDANSETRON 8 MG: 4 TABLET, ORALLY DISINTEGRATING ORAL at 22:53

## 2020-08-29 NOTE — DISCHARGE INSTRUCTIONS
Patient Education   When you get home, take the other half of the magnesium citrate. If you do not have a bowel movement by tomorrow afternoon, please fill GoLYTELY and take that as prescribed to have a bowel cleanse. The next time you have constipation, if you have not had a bowel movement in 2 days, start taking a stool softener as well as MiraLAX. You can take the MiraLAX up to 3 times a day as needed. Constipation: Care Instructions  Your Care Instructions     Constipation means that you have a hard time passing stools (bowel movements). People pass stools from 3 times a day to once every 3 days. What is normal for you may be different. Constipation may occur with pain in the rectum and cramping. The pain may get worse when you try to pass stools. Sometimes there are small amounts of bright red blood on toilet paper or the surface of stools. This is because of enlarged veins near the rectum (hemorrhoids). A few changes in your diet and lifestyle may help you avoid ongoing constipation. Your doctor may also prescribe medicine to help loosen your stool. Some medicines can cause constipation. These include pain medicines and antidepressants. Tell your doctor about all the medicines you take. Your doctor may want to make a medicine change to ease your symptoms. Follow-up care is a key part of your treatment and safety. Be sure to make and go to all appointments, and call your doctor if you are having problems. It's also a good idea to know your test results and keep a list of the medicines you take. How can you care for yourself at home? · Drink plenty of fluids, enough so that your urine is light yellow or clear like water. If you have kidney, heart, or liver disease and have to limit fluids, talk with your doctor before you increase the amount of fluids you drink. · Include high-fiber foods in your diet each day. These include fruits, vegetables, beans, and whole grains.   · Get at least 30 minutes of exercise on most days of the week. Walking is a good choice. You also may want to do other activities, such as running, swimming, cycling, or playing tennis or team sports. · Take a fiber supplement, such as Citrucel or Metamucil, every day. Read and follow all instructions on the label. · Schedule time each day for a bowel movement. A daily routine may help. Take your time having your bowel movement. · Support your feet with a small step stool when you sit on the toilet. This helps flex your hips and places your pelvis in a squatting position. · Your doctor may recommend an over-the-counter laxative to relieve your constipation. Examples are Milk of Magnesia and MiraLax. Read and follow all instructions on the label. Do not use laxatives on a long-term basis. When should you call for help? Call your doctor now or seek immediate medical care if:  · You have new or worse belly pain. · You have new or worse nausea or vomiting. · You have blood in your stools. Watch closely for changes in your health, and be sure to contact your doctor if:  · Your constipation is getting worse. · You do not get better as expected. Where can you learn more? Go to http://eddie-leonardo.info/  Enter P343 in the search box to learn more about \"Constipation: Care Instructions. \"  Current as of: June 26, 2019               Content Version: 12.5  © 9623-5472 [employer], [employer]. Care instructions adapted under license by [employer] (which disclaims liability or warranty for this information). If you have questions about a medical condition or this instruction, always ask your healthcare professional. [place] [address] any warranty or liability for your use of this information.

## 2020-08-29 NOTE — ED PROVIDER NOTES
EMERGENCY DEPARTMENT HISTORY AND PHYSICAL EXAM      Date: 8/28/2020  Patient Name: Mckenzie Muñoz  Patient Age and Sex: 30 y.o. female     History of Presenting Illness     Chief Complaint   Patient presents with    Abdominal Pain       History Provided By: Patient    HPI: Mckenzie Muñoz is a 23-year-old female presenting for constipation and abdominal pain. Patient states that she has not had a bowel movement in a week. States that only today because she started to have abdominal pain on her left side that she take 1 dose of a laxative though she is not sure what it is called. Has not been taking any medications over the past couple days for the constipation. Pain is on the left side and associated with nausea and vomiting. Denies any diarrhea, blood in the stool, fevers. Although patient's chart states that she has a history of opioid dependence, patient denies any use of any narcotics recently. There are no other complaints, changes, or physical findings at this time. PCP: None    No current facility-administered medications on file prior to encounter. Current Outpatient Medications on File Prior to Encounter   Medication Sig Dispense Refill    albuterol (PROVENTIL HFA, VENTOLIN HFA, PROAIR HFA) 90 mcg/actuation inhaler Take 2 Puffs by inhalation every four (4) hours as needed for Wheezing. 1 Inhaler 1       Past History     Past Medical History:  Past Medical History:   Diagnosis Date    ADHD (attention deficit hyperactivity disorder)     Asthma     Headache        Past Surgical History:  History reviewed. No pertinent surgical history.     Family History:  Family History   Problem Relation Age of Onset   [facility] Asthma Mother     Hypertension Mother    [facility] Asthma Sister     Hypertension Sister     Asthma Brother     Hypertension Brother     Depression Brother        Social History:  Social History     Tobacco Use    Smoking status: Current Every Day Smoker     Packs/day: 0.50    Smokeless tobacco: Never Used   Substance Use Topics    Alcohol use: Yes    Drug use: Yes     Types: Marijuana, Other, Heroin     Comment: Bianca, heroin use in the last few month. no rehab       Allergies:  No Known Allergies      Review of Systems   Review of Systems   Constitutional: Negative for chills and fever. Respiratory: Negative for cough and shortness of breath. Cardiovascular: Negative for chest pain. Gastrointestinal: Positive for abdominal pain, constipation, nausea and vomiting. Negative for diarrhea. Genitourinary: Negative for dysuria, frequency and hematuria. Neurological: Negative for weakness and numbness. All other systems reviewed and are negative. Physical Exam   Physical Exam  Vitals signs and nursing note reviewed. Constitutional:       Appearance: She is well-developed. HENT:      Head: Normocephalic and atraumatic. Nose: Nose normal.      Mouth/Throat:      Mouth: Mucous membranes are moist.   Eyes:      Extraocular Movements: Extraocular movements intact. Conjunctiva/sclera: Conjunctivae normal.   Neck:      Musculoskeletal: Normal range of motion and neck supple. Cardiovascular:      Rate and Rhythm: Normal rate and regular rhythm. Pulmonary:      Effort: Pulmonary effort is normal. No respiratory distress. Breath sounds: Normal breath sounds. Abdominal:      General: There is no distension. Palpations: Abdomen is soft. Tenderness: There is abdominal tenderness. Comments: Patient is tender in the left upper quadrant and left lower quadrant. Musculoskeletal: Normal range of motion. Skin:     General: Skin is warm and dry. Neurological:      General: No focal deficit present. Mental Status: She is alert and oriented to person, place, and time. Mental status is at baseline.    Psychiatric:         Mood and Affect: Mood normal.          Diagnostic Study Results     Labs -   No results found for this or any previous visit (from the past 12 hour(s)). Radiologic Studies -   XR ABD FLAT/ ERECT   Final Result   IMPRESSION:       1. No bowel obstruction. Normal colonic fecal burden. 2. Left nephrolithiasis. Left pelvic phlebolith versus urinary bladder calculus,   new since 2016. CT Results  (Last 48 hours)    None        CXR Results  (Last 48 hours)    None            Medical Decision Making   I am the first provider for this patient. I reviewed the vital signs, available nursing notes, past medical history, past surgical history, family history and social history. Vital Signs-Reviewed the patient's vital signs. Patient Vitals for the past 12 hrs:   Temp Pulse Resp BP SpO2   08/28/20 2241 -- -- -- -- 97 %   08/28/20 2220 98.3 °F (36.8 °C) 98 14 111/59 97 %       Records Reviewed: Nursing Notes and Old Medical Records    Provider Notes (Medical Decision Making):   Patient presenting with left-sided abdominal pain in the setting of constipation for a week. No risk factors for small bowel obstruction but will get an x-ray to confirm constipation. Story sounds exactly like constipation causing her to have nausea and vomiting as well as abdominal pain. If x-ray looks okay we will do Colace, magnesium citrate. ED Course:   Initial assessment performed. The patients presenting problems have been discussed, and they are in agreement with the care plan formulated and outlined with them. I have encouraged them to ask questions as they arise throughout their visit. Critical Care Time:   0    Disposition:  Discharge Note:  The patient has been re-evaluated and is ready for discharge. Reviewed available results with patient. Counseled patient on diagnosis and care plan. Patient has expressed understanding, and all questions have been answered. Patient agrees with plan and agrees to follow up as recommended, or to return to the ED if their symptoms worsen.  Discharge instructions have been provided and explained to the patient, along with reasons to return to the ED. PLAN:  Current Discharge Medication List      START taking these medications    Details   PEG 3350-Electrolytes (COLYTE;GOLYTELY) solr Take 4,000 mL by mouth once for 1 dose. Over 4 hours  Qty: 1 Bottle, Refills: 0           2. Follow-up Information     Follow up With Specialties Details Why [facility] EMERGENCY DEPT Emergency Medicine  If symptoms worsen [address]        3. Return to ED if worse     Diagnosis     Clinical Impression:   1. Drug-induced constipation        Attestations:    Abdi Warren M.D. Please note that this dictation was completed with [employer], the computer voice recognition software. Quite often unanticipated grammatical, syntax, homophones, and other interpretive errors are inadvertently transcribed by the computer software. Please disregard these errors. Please excuse any errors that have escaped final proofreading. Thank you.

## 2020-08-29 NOTE — ED NOTES
Pt arrived to ED via ambulatory with c/o left sided abdominal pain w/constipation x1 week. Pt. Also reports nausea and vomiting today. Lungs clear. Pt is in no acute distress. Will continue to monitor. See nursing assessment. Safety precautions in place; call light within reach. Emergency Department Nursing Plan of Care       The Nursing Plan of Care is developed from the Nursing assessment and Emergency Department Attending provider initial evaluation. The plan of care may be reviewed in the ED Provider note.     The Plan of Care was developed with the following considerations:   Patient / Family readiness to learn indicated by:verbalized understanding  Persons(s) to be included in education: patient  Barriers to Learning/Limitations:No    Signed     Ajay Hudson RN    8/28/2020   10:40 PM

## 2021-03-12 ENCOUNTER — OFFICE VISIT (OUTPATIENT)
Dept: PRIMARY CARE CLINIC | Age: 31
End: 2021-03-12
Payer: MEDICAID

## 2021-03-12 VITALS
DIASTOLIC BLOOD PRESSURE: 80 MMHG | TEMPERATURE: 98.4 F | HEIGHT: 64 IN | WEIGHT: 140 LBS | BODY MASS INDEX: 23.9 KG/M2 | HEART RATE: 90 BPM | SYSTOLIC BLOOD PRESSURE: 120 MMHG | RESPIRATION RATE: 16 BRPM | OXYGEN SATURATION: 100 %

## 2021-03-12 DIAGNOSIS — J30.9 ALLERGIC RHINITIS, UNSPECIFIED SEASONALITY, UNSPECIFIED TRIGGER: ICD-10-CM

## 2021-03-12 DIAGNOSIS — G43.709 CHRONIC MIGRAINE WITHOUT AURA WITHOUT STATUS MIGRAINOSUS, NOT INTRACTABLE: Primary | ICD-10-CM

## 2021-03-12 DIAGNOSIS — S06.0X0D CONCUSSION WITHOUT LOSS OF CONSCIOUSNESS, SUBSEQUENT ENCOUNTER: ICD-10-CM

## 2021-03-12 DIAGNOSIS — H69.81 DYSFUNCTION OF RIGHT EUSTACHIAN TUBE: ICD-10-CM

## 2021-03-12 DIAGNOSIS — Z76.89 ENCOUNTER TO ESTABLISH CARE: ICD-10-CM

## 2021-03-12 PROCEDURE — 99214 OFFICE O/P EST MOD 30 MIN: CPT | Performed by: NURSE PRACTITIONER

## 2021-03-12 RX ORDER — LANOLIN ALCOHOL/MO/W.PET/CERES
400 CREAM (GRAM) TOPICAL DAILY
Qty: 90 TAB | Refills: 1 | Status: SHIPPED | OUTPATIENT
Start: 2021-03-12 | End: 2022-02-22

## 2021-03-12 RX ORDER — TRAZODONE HYDROCHLORIDE 100 MG/1
TABLET ORAL
COMMUNITY
Start: 2021-02-12 | End: 2021-04-20

## 2021-03-12 RX ORDER — IBUPROFEN 800 MG/1
TABLET ORAL
COMMUNITY
Start: 2021-02-23 | End: 2022-02-22

## 2021-03-12 RX ORDER — BUPROPION HYDROCHLORIDE 150 MG/1
TABLET, EXTENDED RELEASE ORAL
COMMUNITY
Start: 2021-02-12 | End: 2021-04-20

## 2021-03-12 RX ORDER — FLUTICASONE PROPIONATE 50 MCG
2 SPRAY, SUSPENSION (ML) NASAL DAILY
Qty: 3 BOTTLE | Refills: 3 | Status: SHIPPED | OUTPATIENT
Start: 2021-03-12 | End: 2022-08-21

## 2021-03-12 RX ORDER — METHYLPREDNISOLONE 4 MG/1
TABLET ORAL
Qty: 1 DOSE PACK | Refills: 0 | Status: SHIPPED | OUTPATIENT
Start: 2021-03-12 | End: 2021-03-18

## 2021-03-12 RX ORDER — CYCLOBENZAPRINE HCL 10 MG
TABLET ORAL
COMMUNITY
Start: 2021-02-23 | End: 2022-02-22
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
## 2021-03-26 ENCOUNTER — OFFICE VISIT (OUTPATIENT)
Dept: PRIMARY CARE CLINIC | Age: 31
End: 2021-03-26
Payer: MEDICAID

## 2021-03-26 VITALS
OXYGEN SATURATION: 98 % | BODY MASS INDEX: 23.12 KG/M2 | TEMPERATURE: 98.7 F | SYSTOLIC BLOOD PRESSURE: 104 MMHG | DIASTOLIC BLOOD PRESSURE: 68 MMHG | HEART RATE: 75 BPM | RESPIRATION RATE: 16 BRPM | WEIGHT: 135.4 LBS | HEIGHT: 64 IN

## 2021-03-26 DIAGNOSIS — G43.709 CHRONIC MIGRAINE WITHOUT AURA WITHOUT STATUS MIGRAINOSUS, NOT INTRACTABLE: Primary | ICD-10-CM

## 2021-03-26 PROCEDURE — 96372 THER/PROPH/DIAG INJ SC/IM: CPT | Performed by: NURSE PRACTITIONER

## 2021-03-26 PROCEDURE — 99213 OFFICE O/P EST LOW 20 MIN: CPT | Performed by: NURSE PRACTITIONER

## 2021-03-26 RX ORDER — KETOROLAC TROMETHAMINE 30 MG/ML
60 INJECTION, SOLUTION INTRAMUSCULAR; INTRAVENOUS ONCE
Status: COMPLETED | OUTPATIENT
Start: 2021-03-26 | End: 2021-03-26

## 2021-03-26 RX ADMIN — KETOROLAC TROMETHAMINE 60 MG: 30 INJECTION, SOLUTION INTRAMUSCULAR; INTRAVENOUS at 15:34

## 2021-03-26 NOTE — PROGRESS NOTES
Andrew Barfield is a  35 y.o. female presents for visit. Chief Complaint   Patient presents with    Head Pain     woke up with it this morning. HPI  Presents for Toradol injection for severe migraine headache since this morning       Review of Systems   Constitutional: Negative for chills and fever. Eyes: Positive for photophobia. Respiratory: Negative for cough. Neurological: Positive for headaches. Past Medical History:   Diagnosis Date    ADHD (attention deficit hyperactivity disorder)     Asthma     Headache      No past surgical history on file. Social History     Socioeconomic History    Marital status: SINGLE     Spouse name: Not on file    Number of children: Not on file    Years of education: Not on file    Highest education level: Not on file   Occupational History    Not on file   Social Needs    Financial resource strain: Not on file    Food insecurity     Worry: Not on file     Inability: Not on file    Transportation needs     Medical: Not on file     Non-medical: Not on file   Tobacco Use    Smoking status: Current Every Day Smoker     Packs/day: 1.00    Smokeless tobacco: Never Used   Substance and Sexual Activity    Alcohol use: Yes     Comment: occ    Drug use: Yes     Types: Marijuana, Other, Heroin     Comment: Bianca, heroin use in the last few month.  no rehab    Sexual activity: Not Currently     Partners: Female   Lifestyle    Physical activity     Days per week: Not on file     Minutes per session: Not on file    Stress: Not on file   Relationships    Social connections     Talks on phone: Not on file     Gets together: Not on file     Attends Latter day service: Not on file     Active member of club or organization: Not on file     Attends meetings of clubs or organizations: Not on file     Relationship status: Not on file    Intimate partner violence     Fear of current or ex partner: Not on file     Emotionally abused: Not on file Physically abused: Not on file     Forced sexual activity: Not on file   Other Topics Concern    Not on file   Social History Narrative    ** Merged History Encounter **         ** Merged History Encounter **         Live with her girlfriend and sister. Was on disability but recently lost her disability. No legal problem reported. Started seeing psychiatrist- age 7 behavior problem. Reports recent psych hospitalization at [address] [place] [address] for possible OD/drug abuse    No abuse reported. Family History   Problem Relation Age of Onset   [facility] Asthma Mother     Hypertension Mother    [address] [facility] Asthma Sister     Hypertension Sister     Attention Deficit Hyperactivity Disorder Sister     Migraines Sister     Asthma Brother     Hypertension Brother     Depression Brother     Clotting Disorder Brother     Migraines Brother       Prior to Admission medications    Medication Sig Start Date End Date Taking? Authorizing Provider   cyclobenzaprine (FLEXERIL) 10 mg tablet TAKE 1 TABLET BY MOUTH 3 TIMES A DAY FOR BACK PAIN/SPASM 2/23/21  Yes Provider, Historical   traZODone (DESYREL) 100 mg tablet TAKE 1 TABLET BY MOUTH ONCE DAILY AT NIGHT 2/12/21  Yes Provider, Historical   buPROPion ER (ZYBAN,BUPROBAN) 150 mg ER tablet TAKE 1 TABLET BY MOUTH EVERY DAY 2/12/21  Yes Provider, Historical   ibuprofen (MOTRIN) 800 mg tablet TAKE 1 TABLET BY MOUTH 3 TIMES A DAY AS NEEDED 2/23/21  Yes Provider, Historical   magnesium oxide (MAG-OX) 400 mg tablet Take 1 Tab by mouth daily. 3/12/21  Yes Wes Rolle Overall, NP   fluticasone propionate (FLONASE) 50 mcg/actuation nasal spray 2 Sprays by Both Nostrils route daily.  Indications: inflammation of the nose due to an allergy 3/12/21  Yes Wes Rolle Overall, NP   albuterol (PROVENTIL HFA, VENTOLIN HFA, PROAIR HFA) 90 mcg/actuation inhaler Take 2 Puffs by inhalation every four (4) hours as needed for Wheezing. 7/26/18  Yes Ferd Brittle., PA      No Known Allergies     Visit Vitals  /68 (BP 1 Location: Left upper arm, BP Patient Position: Sitting, BP Cuff Size: Small adult)   Pulse 75   Temp 98.7 °F (37.1 °C) (Temporal)   Resp 16   Ht 5' 4\" (1.626 m)   Wt 135 lb 6.4 oz (61.4 kg)   SpO2 98%   BMI 23.24 kg/m²     Physical Exam  Vitals signs and nursing note reviewed. Constitutional:       General: She is not in acute distress. Appearance: Normal appearance. She is ill-appearing (appears to be in pain). HENT:      Head: Normocephalic and atraumatic. Right Ear: External ear normal.      Left Ear: External ear normal.   Eyes:      General:         Right eye: No discharge. Left eye: No discharge. Pulmonary:      Effort: Pulmonary effort is normal. No respiratory distress. Neurological:      Mental Status: She is alert and oriented to person, place, and time. Cranial Nerves: No facial asymmetry. Psychiatric:         Attention and Perception: Attention normal.         Mood and Affect: Mood normal.         Speech: Speech normal.               No results found for this or any previous visit (from the past 24 hour(s)). Patient Active Problem List    Diagnosis Date Noted    Opiate dependence ([facility] [address] 10/27/2017    Polysubstance dependence ([facility] [address] 10/27/2017    Substance induced mood disorder ([facility] [address] 10/26/2017    ADHD (attention deficit hyperactivity disorder) 06/14/2013         ASSESSMENT AND PLAN:      ICD-10-CM ICD-9-CM   1. Chronic migraine without aura without status migrainosus, not intractable  G43.709 346.70     Orders Placed This Encounter    ketorolac tromethamine (TORADOL) 60 mg/2 mL injection 60 mg     Diagnoses and all orders for this visit:    1.  Chronic migraine without aura without status migrainosus, not intractable  -     ketorolac tromethamine (TORADOL) 60 mg/2 mL injection 60 mg            Disclaimer:  Advised her to call back or return to office if symptoms worsen/change/persist.  Discussed expected course/resolution/complications of diagnosis in detail with patient. Medication risks/benefits/alternatives discussed with patient. She was given an after visit summary which includes diagnoses, current medications, & vitals. Discussed patient instructions and advised to read to all patient instructions regarding care. She expressed understanding with the diagnosis and plan.

## 2021-03-26 NOTE — PROGRESS NOTES
Chief Complaint   Patient presents with    Head Pain     woke up with it this morning. 1. Have you been to the ER, urgent care clinic since your last visit? Hospitalized since your last visit? No    2. Have you seen or consulted any other health care providers outside of the [address] [place] [address] since your last visit? Include any pap smears or colon screening.  No

## 2021-04-20 ENCOUNTER — OFFICE VISIT (OUTPATIENT)
Dept: NEUROLOGY | Age: 31
End: 2021-04-20
Payer: MEDICAID

## 2021-04-20 VITALS
WEIGHT: 137 LBS | HEART RATE: 81 BPM | BODY MASS INDEX: 22.82 KG/M2 | OXYGEN SATURATION: 98 % | HEIGHT: 65 IN | DIASTOLIC BLOOD PRESSURE: 72 MMHG | RESPIRATION RATE: 16 BRPM | SYSTOLIC BLOOD PRESSURE: 120 MMHG

## 2021-04-20 DIAGNOSIS — G43.709 CHRONIC MIGRAINE WITHOUT AURA WITHOUT STATUS MIGRAINOSUS, NOT INTRACTABLE: Primary | ICD-10-CM

## 2021-04-20 DIAGNOSIS — Z87.820 HISTORY OF BRAIN CONCUSSION: ICD-10-CM

## 2021-04-20 PROCEDURE — 99204 OFFICE O/P NEW MOD 45 MIN: CPT | Performed by: PSYCHIATRY & NEUROLOGY

## 2021-04-20 RX ORDER — AMITRIPTYLINE HYDROCHLORIDE 25 MG/1
25 TABLET, FILM COATED ORAL
Qty: 30 TAB | Refills: 3 | Status: SHIPPED | OUTPATIENT
Start: 2021-04-20 | End: 2022-02-22

## 2021-04-20 RX ORDER — ARIPIPRAZOLE 10 MG/1
TABLET ORAL
COMMUNITY
Start: 2021-02-12 | End: 2021-04-20
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
## 2022-02-21 PROCEDURE — 99283 EMERGENCY DEPT VISIT LOW MDM: CPT

## 2022-02-22 ENCOUNTER — HOSPITAL ENCOUNTER (EMERGENCY)
Age: 32
Discharge: HOME | End: 2022-02-22
Attending: EMERGENCY MEDICINE | Admitting: EMERGENCY MEDICINE
Payer: MEDICAID

## 2022-02-22 ENCOUNTER — HOSPITAL ENCOUNTER (EMERGENCY)
Age: 32
Discharge: LEFT BEFORE DISCHARGE | End: 2022-02-22
Attending: EMERGENCY MEDICINE | Admitting: EMERGENCY MEDICINE
Payer: MEDICAID

## 2022-02-22 VITALS
BODY MASS INDEX: 22.5 KG/M2 | SYSTOLIC BLOOD PRESSURE: 107 MMHG | HEART RATE: 92 BPM | RESPIRATION RATE: 16 BRPM | HEIGHT: 66 IN | OXYGEN SATURATION: 99 % | WEIGHT: 140 LBS | DIASTOLIC BLOOD PRESSURE: 85 MMHG | TEMPERATURE: 98.6 F

## 2022-02-22 DIAGNOSIS — K08.89 TOOTHACHE: Primary | ICD-10-CM

## 2022-02-22 PROCEDURE — 74011250637 HC RX REV CODE- 250/637: Performed by: EMERGENCY MEDICINE

## 2022-02-22 PROCEDURE — 74011000250 HC RX REV CODE- 250: Performed by: EMERGENCY MEDICINE

## 2022-02-22 PROCEDURE — 75810000275 HC EMERGENCY DEPT VISIT NO LEVEL OF CARE

## 2022-02-22 RX ORDER — PENICILLIN V POTASSIUM 500 MG/1
500 TABLET, FILM COATED ORAL 4 TIMES DAILY
Qty: 28 TABLET | Refills: 0 | Status: SHIPPED | OUTPATIENT
Start: 2022-02-22 | End: 2022-03-01

## 2022-02-22 RX ORDER — METHADONE HYDROCHLORIDE 10 MG/1
60 TABLET ORAL DAILY
Status: ON HOLD | COMMUNITY
End: 2022-03-17

## 2022-02-22 RX ORDER — PENICILLIN V POTASSIUM 500 MG/1
500 TABLET, FILM COATED ORAL 4 TIMES DAILY
Qty: 28 TABLET | Refills: 0 | Status: SHIPPED | OUTPATIENT
Start: 2022-02-22 | End: 2022-02-22 | Stop reason: SDUPTHER

## 2022-02-22 RX ORDER — PENICILLIN V POTASSIUM 250 MG/1
500 TABLET, FILM COATED ORAL
Status: COMPLETED | OUTPATIENT
Start: 2022-02-22 | End: 2022-02-22

## 2022-02-22 RX ADMIN — PENICILLIN V POTASSIUM 500 MG: 250 TABLET, FILM COATED ORAL at 00:36

## 2022-02-22 RX ADMIN — Medication: at 00:36

## 2022-02-22 NOTE — ED PROVIDER NOTES
History of ADHD, asthma, substance abuse. She presents accompanied by a friend with complaints of a 2-week history of a toothache. She points to the left lateral molar region. She has tried ibuprofen, Tylenol, and Orajel with limited relief. The pain radiates from her tooth to her left jaw and ear. She has noted some mild swelling. No fever. Good p.o. intake. Past Medical History:   Diagnosis Date    ADHD (attention deficit hyperactivity disorder)     Asthma     Headache     Substance induced mood disorder ([facility] [address]        History reviewed. No pertinent surgical history. Family History:   Problem Relation Age of Onset   [employer] Asthma Mother     Hypertension Mother     Asthma Sister     Hypertension Sister     Attention Deficit Hyperactivity Disorder Sister     Migraines Sister     Asthma Brother     Hypertension Brother     Depression Brother     Clotting Disorder Brother     Migraines Brother        Social History     Socioeconomic History    Marital status: SINGLE     Spouse name: Not on file    Number of children: Not on file    Years of education: Not on file    Highest education level: Not on file   Occupational History    Not on file   Tobacco Use    Smoking status: Current Every Day Smoker     Packs/day: 1.00    Smokeless tobacco: Never Used    Tobacco comment: pt states she vapes   Vaping Use    Vaping Use: Not on file   Substance and Sexual Activity    Alcohol use: Never    Drug use: Not Currently     Types: Marijuana, Other, Heroin     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 Was on disability but recently lost her disability. No legal problem reported. Started seeing psychiatrist- age 7 behavior problem.  Reports recent psych hospitalization at [address] [facility] for possible OD/drug abuse    No abuse reported. Social Determinants of Health     Financial Resource Strain:     Difficulty of Paying Living Expenses: Not on file   Food Insecurity:     Worried About Running Out of Food in the Last Year: Not on file    Jamel of Food in the Last Year: Not on file   Transportation Needs:     Lack of Transportation (Medical): Not on file    Lack of Transportation (Non-Medical): Not on file   Physical Activity:     Days of Exercise per Week: Not on file    Minutes of Exercise per Session: Not on file   Stress:     Feeling of Stress : Not on file   Social Connections:     Frequency of Communication with Friends and Family: Not on file    Frequency of Social Gatherings with Friends and Family: Not on file    Attends Denominational Services: Not on file    Active Member of [address] [place] [address] [employer] or Organizations: Not on file    Attends Club or Organization Meetings: Not on file    Marital Status: Not on file   Intimate Partner Violence:     Fear of Current or Ex-Partner: Not on file    Emotionally Abused: Not on file    Physically Abused: Not on file    Sexually Abused: Not on file   Housing Stability:     Unable to Pay for Housing in the Last Year: Not on file    Number of [place] in the Last Year: Not on file    Unstable Housing in the Last Year: Not on file         ALLERGIES: Patient has no known allergies. Review of Systems   All other systems reviewed and are negative. Vitals:    02/22/22 0002   BP: 107/85   Pulse: 92   Resp: 16   Temp: 98.6 °F (37 °C)   SpO2: 99%   Weight: 63.5 kg (140 lb)   Height: 5' 6\" (1.676 m)            Physical Exam  Vitals and nursing note reviewed. Constitutional:       Appearance: She is well-developed. HENT:      Head: Normocephalic and atraumatic. Mouth/Throat:      Comments: Tender left lower molar. No abscess noted. No swelling noted. No trismus or difficulty swallowing.   Eyes: Conjunctiva/sclera: Conjunctivae normal.   Neck:      Trachea: No tracheal deviation. Cardiovascular:      Rate and Rhythm: Normal rate. Pulmonary:      Effort: Pulmonary effort is normal.   Abdominal:      General: There is no distension. Skin:     General: Skin is dry. Neurological:      Mental Status: She is alert. MDM       Procedures      Assessment/plan: Toothache -dental ball and penicillin in the ED and penicillin for home. [facility] follow-up information provided. Return precautions discussed.   Carolyn Terrazas MD

## 2022-02-22 NOTE — ED TRIAGE NOTES
Pt reports onset of dental pain 2 weeks ago. Pain unrelieved by Ibuprofen, Tylenol and Oragel. Pain now radiates from left lower jaw to her left ear.

## 2022-03-15 ENCOUNTER — HOSPITAL ENCOUNTER (EMERGENCY)
Age: 32
Discharge: HOME | End: 2022-03-15
Attending: EMERGENCY MEDICINE
Payer: MEDICAID

## 2022-03-15 VITALS
HEART RATE: 100 BPM | SYSTOLIC BLOOD PRESSURE: 123 MMHG | TEMPERATURE: 99.1 F | DIASTOLIC BLOOD PRESSURE: 80 MMHG | WEIGHT: 141.76 LBS | OXYGEN SATURATION: 97 % | BODY MASS INDEX: 22.88 KG/M2 | RESPIRATION RATE: 16 BRPM

## 2022-03-15 DIAGNOSIS — J36 PERITONSILLAR ABSCESS: Primary | ICD-10-CM

## 2022-03-15 LAB — DEPRECATED S PYO AG THROAT QL EIA: NEGATIVE

## 2022-03-15 PROCEDURE — 99283 EMERGENCY DEPT VISIT LOW MDM: CPT

## 2022-03-15 PROCEDURE — 87880 STREP A ASSAY W/OPTIC: CPT

## 2022-03-15 PROCEDURE — 74011250637 HC RX REV CODE- 250/637: Performed by: EMERGENCY MEDICINE

## 2022-03-15 PROCEDURE — 87070 CULTURE OTHR SPECIMN AEROBIC: CPT

## 2022-03-15 RX ORDER — CLINDAMYCIN HYDROCHLORIDE 300 MG/1
300 CAPSULE ORAL 4 TIMES DAILY
Qty: 28 CAPSULE | Refills: 0 | Status: ON HOLD | OUTPATIENT
Start: 2022-03-15 | End: 2022-03-19 | Stop reason: SDUPTHER

## 2022-03-15 RX ORDER — CLINDAMYCIN HYDROCHLORIDE 150 MG/1
300 CAPSULE ORAL
Status: COMPLETED | OUTPATIENT
Start: 2022-03-15 | End: 2022-03-15

## 2022-03-15 RX ORDER — OXYCODONE AND ACETAMINOPHEN 5; 325 MG/1; MG/1
1 TABLET ORAL
Qty: 6 TABLET | Refills: 0 | Status: ON HOLD | OUTPATIENT
Start: 2022-03-15 | End: 2022-03-19

## 2022-03-15 RX ORDER — OXYCODONE AND ACETAMINOPHEN 5; 325 MG/1; MG/1
1 TABLET ORAL
Status: COMPLETED | OUTPATIENT
Start: 2022-03-15 | End: 2022-03-15

## 2022-03-15 RX ADMIN — OXYCODONE HYDROCHLORIDE AND ACETAMINOPHEN 1 TABLET: 5; 325 TABLET ORAL at 21:56

## 2022-03-15 RX ADMIN — CLINDAMYCIN HYDROCHLORIDE 300 MG: 150 CAPSULE ORAL at 21:56

## 2022-03-16 ENCOUNTER — APPOINTMENT (OUTPATIENT)
Dept: CT IMAGING | Age: 32
DRG: 113 | End: 2022-03-16
Attending: PHYSICIAN ASSISTANT
Payer: MEDICAID

## 2022-03-16 ENCOUNTER — HOSPITAL ENCOUNTER (INPATIENT)
Age: 32
LOS: 3 days | Discharge: HOME | DRG: 113 | End: 2022-03-19
Attending: EMERGENCY MEDICINE | Admitting: FAMILY MEDICINE
Payer: MEDICAID

## 2022-03-16 DIAGNOSIS — J36 PERITONSILLAR ABSCESS: Primary | ICD-10-CM

## 2022-03-16 LAB
ANION GAP BLD CALC-SCNC: 9 MMOL/L (ref 10–20)
BASOPHILS # BLD: 0 K/UL (ref 0–0.1)
BASOPHILS NFR BLD: 0 % (ref 0–1)
CA-I BLD-MCNC: 1.31 MMOL/L (ref 1.12–1.32)
CA-I BLD-SCNC: 1.2 MMOL/L (ref 1.12–1.32)
CHLORIDE BLD-SCNC: 102 MMOL/L (ref 98–107)
CO2 BLD-SCNC: 29.2 MMOL/L (ref 21–32)
CREAT BLD-MCNC: 0.82 MG/DL (ref 0.6–1.3)
DIFFERENTIAL METHOD BLD: ABNORMAL
EOSINOPHIL # BLD: 0.3 K/UL (ref 0–0.4)
EOSINOPHIL NFR BLD: 3 % (ref 0–7)
ERYTHROCYTE [DISTWIDTH] IN BLOOD BY AUTOMATED COUNT: 14.2 % (ref 11.5–14.5)
GLUCOSE BLD-MCNC: 83 MG/DL (ref 65–100)
HCG UR QL: NEGATIVE
HCT VFR BLD AUTO: 35.1 % (ref 35–47)
HGB BLD-MCNC: 11.3 G/DL (ref 11.5–16)
IMM GRANULOCYTES # BLD AUTO: 0 K/UL (ref 0–0.04)
IMM GRANULOCYTES NFR BLD AUTO: 0 % (ref 0–0.5)
LYMPHOCYTES # BLD: 2.8 K/UL (ref 0.8–3.5)
LYMPHOCYTES NFR BLD: 25 % (ref 12–49)
MCH RBC QN AUTO: 28.8 PG (ref 26–34)
MCHC RBC AUTO-ENTMCNC: 32.2 G/DL (ref 30–36.5)
MCV RBC AUTO: 89.5 FL (ref 80–99)
MONOCYTES # BLD: 0.9 K/UL (ref 0–1)
MONOCYTES NFR BLD: 8 % (ref 5–13)
NEUTS SEG # BLD: 7.3 K/UL (ref 1.8–8)
NEUTS SEG NFR BLD: 64 % (ref 32–75)
NRBC # BLD: 0 K/UL (ref 0–0.01)
NRBC BLD-RTO: 0 PER 100 WBC
PLATELET # BLD AUTO: 354 K/UL (ref 150–400)
PMV BLD AUTO: 9.3 FL (ref 8.9–12.9)
POTASSIUM BLD-SCNC: 4.3 MMOL/L (ref 3.5–5.1)
RBC # BLD AUTO: 3.92 M/UL (ref 3.8–5.2)
SERVICE CMNT-IMP: ABNORMAL
SODIUM BLD-SCNC: 139 MMOL/L (ref 136–145)
WBC # BLD AUTO: 11.4 K/UL (ref 3.6–11)

## 2022-03-16 PROCEDURE — 74011250636 HC RX REV CODE- 250/636: Performed by: PHYSICIAN ASSISTANT

## 2022-03-16 PROCEDURE — 82330 ASSAY OF CALCIUM: CPT

## 2022-03-16 PROCEDURE — 70491 CT SOFT TISSUE NECK W/DYE: CPT

## 2022-03-16 PROCEDURE — 74011000250 HC RX REV CODE- 250: Performed by: SPECIALIST

## 2022-03-16 PROCEDURE — 99285 EMERGENCY DEPT VISIT HI MDM: CPT

## 2022-03-16 PROCEDURE — 65660000000 HC RM CCU STEPDOWN

## 2022-03-16 PROCEDURE — 96374 THER/PROPH/DIAG INJ IV PUSH: CPT

## 2022-03-16 PROCEDURE — 81025 URINE PREGNANCY TEST: CPT

## 2022-03-16 PROCEDURE — 74011250636 HC RX REV CODE- 250/636: Performed by: OTOLARYNGOLOGY

## 2022-03-16 PROCEDURE — 80047 BASIC METABLC PNL IONIZED CA: CPT

## 2022-03-16 PROCEDURE — 74011000636 HC RX REV CODE- 636

## 2022-03-16 PROCEDURE — 36415 COLL VENOUS BLD VENIPUNCTURE: CPT

## 2022-03-16 PROCEDURE — 85025 COMPLETE CBC W/AUTO DIFF WBC: CPT

## 2022-03-16 RX ORDER — SODIUM CHLORIDE 0.9 % (FLUSH) 0.9 %
5-40 SYRINGE (ML) INJECTION AS NEEDED
Status: DISCONTINUED | OUTPATIENT
Start: 2022-03-16 | End: 2022-03-19 | Stop reason: HOSPADM

## 2022-03-16 RX ORDER — ONDANSETRON 2 MG/ML
4 INJECTION INTRAMUSCULAR; INTRAVENOUS
Status: DISCONTINUED | OUTPATIENT
Start: 2022-03-16 | End: 2022-03-19 | Stop reason: HOSPADM

## 2022-03-16 RX ORDER — DEXAMETHASONE SODIUM PHOSPHATE 10 MG/ML
10 INJECTION INTRAMUSCULAR; INTRAVENOUS ONCE
Status: COMPLETED | OUTPATIENT
Start: 2022-03-16 | End: 2022-03-16

## 2022-03-16 RX ORDER — KETOROLAC TROMETHAMINE 30 MG/ML
15 INJECTION, SOLUTION INTRAMUSCULAR; INTRAVENOUS
Status: DISCONTINUED | OUTPATIENT
Start: 2022-03-16 | End: 2022-03-19 | Stop reason: HOSPADM

## 2022-03-16 RX ORDER — ACETAMINOPHEN 650 MG/1
650 SUPPOSITORY RECTAL
Status: DISCONTINUED | OUTPATIENT
Start: 2022-03-16 | End: 2022-03-19 | Stop reason: HOSPADM

## 2022-03-16 RX ORDER — LIDOCAINE HYDROCHLORIDE AND EPINEPHRINE 10; 10 MG/ML; UG/ML
10 INJECTION, SOLUTION INFILTRATION; PERINEURAL ONCE
Status: COMPLETED | OUTPATIENT
Start: 2022-03-16 | End: 2022-03-16

## 2022-03-16 RX ORDER — SODIUM CHLORIDE 0.9 % (FLUSH) 0.9 %
5-40 SYRINGE (ML) INJECTION EVERY 8 HOURS
Status: DISCONTINUED | OUTPATIENT
Start: 2022-03-17 | End: 2022-03-19 | Stop reason: HOSPADM

## 2022-03-16 RX ORDER — CLINDAMYCIN PHOSPHATE 900 MG/50ML
900 INJECTION INTRAVENOUS
Status: COMPLETED | OUTPATIENT
Start: 2022-03-16 | End: 2022-03-16

## 2022-03-16 RX ORDER — DEXAMETHASONE SODIUM PHOSPHATE 10 MG/ML
9 INJECTION INTRAMUSCULAR; INTRAVENOUS EVERY 8 HOURS
Status: DISCONTINUED | OUTPATIENT
Start: 2022-03-16 | End: 2022-03-19 | Stop reason: HOSPADM

## 2022-03-16 RX ORDER — ACETAMINOPHEN 325 MG/1
650 TABLET ORAL
Status: DISCONTINUED | OUTPATIENT
Start: 2022-03-16 | End: 2022-03-19 | Stop reason: HOSPADM

## 2022-03-16 RX ORDER — MORPHINE SULFATE 2 MG/ML
2 INJECTION, SOLUTION INTRAMUSCULAR; INTRAVENOUS
Status: DISCONTINUED | OUTPATIENT
Start: 2022-03-16 | End: 2022-03-18

## 2022-03-16 RX ORDER — KETOROLAC TROMETHAMINE 30 MG/ML
30 INJECTION, SOLUTION INTRAMUSCULAR; INTRAVENOUS
Status: COMPLETED | OUTPATIENT
Start: 2022-03-16 | End: 2022-03-16

## 2022-03-16 RX ORDER — SODIUM CHLORIDE 9 MG/ML
125 INJECTION, SOLUTION INTRAVENOUS CONTINUOUS
Status: DISCONTINUED | OUTPATIENT
Start: 2022-03-17 | End: 2022-03-19 | Stop reason: HOSPADM

## 2022-03-16 RX ORDER — CLINDAMYCIN PHOSPHATE 900 MG/50ML
900 INJECTION INTRAVENOUS EVERY 8 HOURS
Status: DISCONTINUED | OUTPATIENT
Start: 2022-03-17 | End: 2022-03-19 | Stop reason: HOSPADM

## 2022-03-16 RX ORDER — ONDANSETRON 4 MG/1
4 TABLET, ORALLY DISINTEGRATING ORAL
Status: DISCONTINUED | OUTPATIENT
Start: 2022-03-16 | End: 2022-03-19 | Stop reason: HOSPADM

## 2022-03-16 RX ORDER — POLYETHYLENE GLYCOL 3350 17 G/17G
17 POWDER, FOR SOLUTION ORAL DAILY PRN
Status: DISCONTINUED | OUTPATIENT
Start: 2022-03-16 | End: 2022-03-19 | Stop reason: HOSPADM

## 2022-03-16 RX ADMIN — CLINDAMYCIN PHOSPHATE 900 MG: 900 INJECTION, SOLUTION INTRAVENOUS at 17:27

## 2022-03-16 RX ADMIN — KETOROLAC TROMETHAMINE 30 MG: 30 INJECTION, SOLUTION INTRAMUSCULAR; INTRAVENOUS at 17:26

## 2022-03-16 RX ADMIN — IOPAMIDOL 100 ML: 612 INJECTION, SOLUTION INTRAVENOUS at 13:59

## 2022-03-16 RX ADMIN — LIDOCAINE HYDROCHLORIDE,EPINEPHRINE BITARTRATE 100 MG: 10; .01 INJECTION, SOLUTION INFILTRATION; PERINEURAL at 20:00

## 2022-03-16 RX ADMIN — DEXAMETHASONE SODIUM PHOSPHATE 9 MG: 10 INJECTION INTRAMUSCULAR; INTRAVENOUS at 21:54

## 2022-03-16 RX ADMIN — DEXAMETHASONE SODIUM PHOSPHATE 10 MG: 10 INJECTION, SOLUTION INTRAMUSCULAR; INTRAVENOUS at 13:41

## 2022-03-16 NOTE — ED NOTES
Pt verbalizes readiness for discharge. Pt states she will return to ED if she is unable to get an appt with ENT tomorrow.

## 2022-03-16 NOTE — ED NOTES
TRANSFER - OUT REPORT:    Verbal report given to Dennis Padilla RN(name) on [employer]  being transferred to [facility](unit) for routine progression of care       Report consisted of patients Situation, Background, Assessment and   Recommendations(SBAR). Information from the following report(s) ED Summary, MAR and Recent Results was reviewed with the receiving nurse. Lines:   Peripheral IV 03/16/22 Right Antecubital (Active)        Opportunity for questions and clarification was provided.       Patient transported with:   Monitor

## 2022-03-16 NOTE — ED PROVIDER NOTES
Patient is a 32-year-old female past medical history significant for opiate dependence, on methadone daily, who returns to the ER for continued sore throat and swelling. She was seen at [facility] ER last night for similar symptoms, diagnosed with a peritonsillar abscess, prescribed clindamycin and Percocet which she endorses feeling and taking as directed. She was instructed to follow-up with ENT today for evaluation and further management and mother who is with patient states she has tried to call \"all morning and has not been able to reach anyone\", prompting the ER visit. Patient states she is still having sore throat. No new or worsening symptoms. She states she had a fever yesterday. No history of throat surgeries. No difficulty swallowing           Past Medical History:   Diagnosis Date    ADHD (attention deficit hyperactivity disorder)     Asthma     Headache     Substance induced mood disorder ([address]        No past surgical history on file.       Family History:   Problem Relation Age of Onset   [employer] Asthma Mother     Hypertension Mother     Asthma Sister     Hypertension Sister     Attention Deficit Hyperactivity Disorder Sister     Migraines Sister     Asthma Brother     Hypertension Brother     Depression Brother     Clotting Disorder Brother     Migraines Brother        Social History     Socioeconomic History    Marital status: SINGLE     Spouse name: Not on file    Number of children: Not on file    Years of education: Not on file    Highest education level: Not on file   Occupational History    Not on file   Tobacco Use    Smoking status: Current Every Day Smoker     Packs/day: 1.00    Smokeless tobacco: Never Used    Tobacco comment: pt states she vapes   Vaping Use    Vaping Use: Not on file   Substance and Sexual Activity    Alcohol use: Never    Drug use: Not Currently     Types: Marijuana, Other, Heroin     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 Was on disability but recently lost her disability. No legal problem reported. Started seeing psychiatrist- age 7 behavior problem. Reports recent psych hospitalization at [facility] for possible OD/drug abuse    No abuse reported. Social Determinants of Health     Financial Resource Strain:     Difficulty of Paying Living Expenses: Not on file   Food Insecurity:     Worried About Running Out of Food in the Last Year: Not on file    Jamel of Food in the Last Year: Not on file   Transportation Needs:     Lack of Transportation (Medical): Not on file    Lack of Transportation (Non-Medical): Not on file   Physical Activity:     Days of Exercise per Week: Not on file    Minutes of Exercise per Session: Not on file   Stress:     Feeling of Stress : Not on file   Social Connections:     Frequency of Communication with Friends and Family: Not on file    Frequency of Social Gatherings with Friends and Family: Not on file    Attends [employer] Services: Not on file    Active Member of [address] [place] [address] [employer] or Organizations: Not on file    Attends Club or Organization Meetings: Not on file    Marital Status: Not on file   Intimate Partner Violence:     Fear of Current or Ex-Partner: Not on file    Emotionally Abused: Not on file    Physically Abused: Not on file    Sexually Abused: Not on file   Housing Stability:     Unable to Pay for Housing in the Last Year: Not on file    Number of [place] in the Last Year: Not on file    Unstable Housing in the Last Year: Not on file         ALLERGIES: Patient has no known allergies. Review of Systems   Constitutional: Positive for fever (x yesterday). HENT: Positive for sore throat.  Negative for rhinorrhea, trouble swallowing and voice change. Respiratory: Negative for shortness of breath. Cardiovascular: Negative for chest pain. All other systems reviewed and are negative. Vitals:    03/16/22 1209   BP: 113/81   Pulse: 82   Resp: 16   Temp: 98 °F (36.7 °C)   SpO2: 97%   Weight: 64.3 kg (141 lb 12.1 oz)   Height: 5' 6\" (1.676 m)            Physical Exam  Vitals and nursing note reviewed. Constitutional:       General: She is not in acute distress. Appearance: She is well-developed. She is not toxic-appearing or diaphoretic. HENT:      Head: Normocephalic and atraumatic. Right Ear: Tympanic membrane normal.      Left Ear: Tympanic membrane normal.      Nose: No congestion or rhinorrhea. Mouth/Throat:     Eyes:      General:         Right eye: No discharge. Left eye: No discharge. Extraocular Movements: Extraocular movements intact. Conjunctiva/sclera: Conjunctivae normal.      Pupils: Pupils are equal, round, and reactive to light. Neck:      Trachea: No tracheal tenderness. Cardiovascular:      Rate and Rhythm: Normal rate and regular rhythm. Pulses: Normal pulses. Heart sounds: Normal heart sounds. No murmur heard. No friction rub. No gallop. Pulmonary:      Effort: Pulmonary effort is normal. No respiratory distress. Breath sounds: Normal breath sounds. No wheezing or rales. Chest:      Chest wall: No tenderness. Abdominal:      General: Bowel sounds are normal. There is no distension. Palpations: Abdomen is soft. Tenderness: There is no abdominal tenderness. There is no guarding or rebound. Musculoskeletal:         General: No tenderness. Normal range of motion. Cervical back: Full passive range of motion without pain, normal range of motion and neck supple. No tenderness. Skin:     General: Skin is warm and dry. Capillary Refill: Capillary refill takes less than 2 seconds. Findings: No abrasion, erythema or rash.    Neurological:      Mental Status: She is alert and oriented to person, place, and time. Cranial Nerves: No cranial nerve deficit. Sensory: No sensory deficit. Coordination: Coordination normal.   Psychiatric:         Speech: Speech normal.         Behavior: Behavior normal.          MDM  Number of Diagnoses or Management Options  Diagnosis management comments:   DDx: PTA, tonsillitis       Amount and/or Complexity of Data Reviewed  Review and summarize past medical records: yes  Discuss the patient with other providers: yes (ENT, ER attending)    Patient Progress  Patient progress: stable         Procedures    12:40pm- I spoke with the office for Dr. Sonam Strong who is on-call for ENT who states they would like a CT scan before giving further recommendations. 3:09 PM  Called CT for update on CT result and tech states the Radiologist is reading the CT now. Merline Muff, PA-C    CONSULT NOTE:   3:34 PM  Merline Muff, PA-C spoke with the office for Dr. Sonam Strong who states \"Dr. Sonam Strong wants the patient sent to [facility] ER and when the patient arrives have the ER provider call her and she will come in and drain the abscess. \"   Patient is well-appearing, maintaining her airway. Will reach out to EMS transport for ETA. [facility] ETA \"at least 5pm\". Patient would like to be discharged and she and family with her agree to go straight to [facility] ER. I spoke with Dr. Deric Stubbs, ER attending at [facility] ER who is aware of patient and will accept and understands to notify Dr. Sonam Strong upon patient's arrival to the ER.     4:00 PM  Dr. Melinda Mejía called back and states due to the size of the abscess, does not warrant emergent I&D drain it at this time, states she would like patient admitted overnight with IV clindamycin, Decadron ordered and ENT will see patient in consult. Perfect Serve Consult for Admission  4:02 PM    ED Room Number: SER03/03  Patient Name and age:  Venkata Hemphill 28 y.o.  female  Working Diagnosis:   1.  Peritonsillar abscess        COVID-19 Suspicion:  no  Sepsis present:  no  Reassessment needed: N/A  Code Status:  Full Code  Readmission: no  Isolation Requirements:  no  Recommended Level of Care:  med/surg  Department:[place] - [phone]  Other: Patient with a peritonsillar abscess. Has been on 2 doses of clindamycin, Decadron and Toradol given here. Spoke with ENT, Dr. Krunal Tracey, who does not feel this requires I&D at this time but would like patient admitted with IV clindamycin and Decadron.

## 2022-03-16 NOTE — ED TRIAGE NOTES
PT seen here yesterday for peritonsillar abcess. She was unable to get an appointment with ENT for today and returned to ED. Pt presents to ED awake, alert, oriented, ambulatory, able to swallow and manage secretions.

## 2022-03-16 NOTE — ED NOTES
Handoff to [facility]. Pt on EMS stretcher, transfer to [facility] .

## 2022-03-17 PROCEDURE — 74011250636 HC RX REV CODE- 250/636: Performed by: OTOLARYNGOLOGY

## 2022-03-17 PROCEDURE — 74011000250 HC RX REV CODE- 250: Performed by: STUDENT IN AN ORGANIZED HEALTH CARE EDUCATION/TRAINING PROGRAM

## 2022-03-17 PROCEDURE — 65270000029 HC RM PRIVATE

## 2022-03-17 PROCEDURE — 74011250637 HC RX REV CODE- 250/637: Performed by: NURSE PRACTITIONER

## 2022-03-17 PROCEDURE — 74011250637 HC RX REV CODE- 250/637: Performed by: HOSPITALIST

## 2022-03-17 PROCEDURE — 74011250636 HC RX REV CODE- 250/636: Performed by: STUDENT IN AN ORGANIZED HEALTH CARE EDUCATION/TRAINING PROGRAM

## 2022-03-17 PROCEDURE — 74011250636 HC RX REV CODE- 250/636: Performed by: HOSPITALIST

## 2022-03-17 RX ORDER — METHADONE HYDROCHLORIDE 5 MG/5ML
115 SOLUTION ORAL DAILY
Status: DISCONTINUED | OUTPATIENT
Start: 2022-03-17 | End: 2022-03-19 | Stop reason: HOSPADM

## 2022-03-17 RX ORDER — IPRATROPIUM BROMIDE AND ALBUTEROL SULFATE 2.5; .5 MG/3ML; MG/3ML
3 SOLUTION RESPIRATORY (INHALATION)
Status: DISCONTINUED | OUTPATIENT
Start: 2022-03-17 | End: 2022-03-19 | Stop reason: HOSPADM

## 2022-03-17 RX ORDER — METHADONE HYDROCHLORIDE 10 MG/ML
115 CONCENTRATE ORAL DAILY
COMMUNITY
End: 2022-08-21

## 2022-03-17 RX ORDER — ENOXAPARIN SODIUM 100 MG/ML
40 INJECTION SUBCUTANEOUS EVERY 24 HOURS
Status: DISCONTINUED | OUTPATIENT
Start: 2022-03-17 | End: 2022-03-19 | Stop reason: HOSPADM

## 2022-03-17 RX ORDER — DIPHENHYDRAMINE HCL 25 MG
25 CAPSULE ORAL
Status: DISCONTINUED | OUTPATIENT
Start: 2022-03-17 | End: 2022-03-19 | Stop reason: HOSPADM

## 2022-03-17 RX ADMIN — METHADONE HYDROCHLORIDE 115 MG: 5 SOLUTION ORAL at 11:21

## 2022-03-17 RX ADMIN — SODIUM CHLORIDE, PRESERVATIVE FREE 10 ML: 5 INJECTION INTRAVENOUS at 22:00

## 2022-03-17 RX ADMIN — DEXAMETHASONE SODIUM PHOSPHATE 9 MG: 10 INJECTION INTRAMUSCULAR; INTRAVENOUS at 22:00

## 2022-03-17 RX ADMIN — SODIUM CHLORIDE 125 ML/HR: 9 INJECTION, SOLUTION INTRAVENOUS at 01:34

## 2022-03-17 RX ADMIN — SODIUM CHLORIDE, PRESERVATIVE FREE 10 ML: 5 INJECTION INTRAVENOUS at 15:19

## 2022-03-17 RX ADMIN — SODIUM CHLORIDE, PRESERVATIVE FREE 10 ML: 5 INJECTION INTRAVENOUS at 01:34

## 2022-03-17 RX ADMIN — DEXAMETHASONE SODIUM PHOSPHATE 9 MG: 10 INJECTION INTRAMUSCULAR; INTRAVENOUS at 07:12

## 2022-03-17 RX ADMIN — DIPHENHYDRAMINE HYDROCHLORIDE 25 MG: 25 CAPSULE ORAL at 11:26

## 2022-03-17 RX ADMIN — DIPHENHYDRAMINE HYDROCHLORIDE 25 MG: 25 CAPSULE ORAL at 20:20

## 2022-03-17 RX ADMIN — CLINDAMYCIN IN 5 PERCENT DEXTROSE 900 MG: 18 INJECTION, SOLUTION INTRAVENOUS at 09:32

## 2022-03-17 RX ADMIN — ENOXAPARIN SODIUM 40 MG: 100 INJECTION SUBCUTANEOUS at 15:19

## 2022-03-17 RX ADMIN — DIPHENHYDRAMINE HYDROCHLORIDE 25 MG: 25 CAPSULE ORAL at 04:26

## 2022-03-17 RX ADMIN — CLINDAMYCIN IN 5 PERCENT DEXTROSE 900 MG: 18 INJECTION, SOLUTION INTRAVENOUS at 01:34

## 2022-03-17 RX ADMIN — CLINDAMYCIN IN 5 PERCENT DEXTROSE 900 MG: 18 INJECTION, SOLUTION INTRAVENOUS at 17:44

## 2022-03-17 RX ADMIN — DEXAMETHASONE SODIUM PHOSPHATE 9 MG: 10 INJECTION INTRAMUSCULAR; INTRAVENOUS at 15:19

## 2022-03-17 NOTE — PROGRESS NOTES
Per Dr. Melchor Laughter ok to order patient a regular diet. He also stated ok for patient to shower.

## 2022-03-17 NOTE — PROGRESS NOTES
Bedside and Verbal shift change report given to Cristobal Hinkle (oncoming nurse) by Laith Reynoso (offgoing nurse). Report included the following information SBAR, Kardex, Intake/Output, MAR and Recent Results.

## 2022-03-17 NOTE — PROGRESS NOTES
Hospital follow-up new PCP transitional care appointment has been scheduled with Zay Wiley NP for Tuesday, 4/5/22 at 9:30 a.m. Pending patient discharge. Alexey Luna, Care Management Assistant.

## 2022-03-17 NOTE — H&P
History & Physical    Primary Care Provider: None  Source of Information: Patient and chart review    History of Presenting Illness:   Jewels Arthur is a 28 y.o. female with history of asthma, mood disorder, ADHD who presented to hospital with complaints of sore throat and mild dysphagia. Initially seen in ED on March 13 with complaints of sore throat, was treated with clindamycin and discharged home with ENT follow-up. Today, she reports worsening throat pain and dysphagia which prompted her to return to ED. Was also unable to follow-up with ENT. The patient denies any fever, chills, chest or abdominal pain, nausea, vomiting, cough, congestion, recent illness, palpitations, or dysuria. Remarkable vitals on ER Presentations: Vital signs stable. Labs Remarkable for: Unremarkable  ER Images: CT neck and soft tissue showed right peritonsillar abscess with concern for developing right retropharyngeal fluid collection. Review of Systems:  A comprehensive review of systems was negative except for that written in the History of Present Illness. Past Medical History:   Diagnosis Date    ADHD (attention deficit hyperactivity disorder)     Asthma     Headache     Substance induced mood disorder (H[facility])       No past surgical history on file. Prior to Admission medications    Medication Sig Start Date End Date Taking? Authorizing Provider   clindamycin (CLEOCIN) 300 mg capsule Take 1 Capsule by mouth four (4) times daily for 7 days. 3/15/22 3/22/22  Marcio Renteria MD   oxyCODONE-acetaminophen (Percocet) 5-325 mg per tablet Take 1 Tablet by mouth every six (6) hours as needed for Pain for up to 2 days. Max Daily Amount: 4 Tablets. 3/15/22 3/17/22  Marcio Renteria MD   methadone (DOLOPHINE) 10 mg tablet Take 60 mg by mouth daily. Other, MD David   fluticasone propionate (FLONASE) 50 mcg/actuation nasal spray 2 Sprays by Both Nostrils route daily.  Indications: inflammation of the nose due to an allergy 3/12/21   Rosalinda Rolle, NP   albuterol (PROVENTIL HFA, VENTOLIN HFA, PROAIR HFA) 90 mcg/actuation inhaler Take 2 Puffs by inhalation every four (4) hours as needed for Wheezing. 7/26/18   MEET Ba     No Known Allergies   Family History   Problem Relation Age of Onset   [facility] Asthma Mother     Hypertension Mother     Asthma Sister     Hypertension Sister     Attention Deficit Hyperactivity Disorder Sister     Migraines Sister     Asthma Brother     Hypertension Brother     Depression Brother     Clotting Disorder Brother     Migraines Brother         SOCIAL HISTORY:  Patient resides:  Independently x   Assisted Living    SNF    With family care       Smoking history:   None x   Former    Chronic      Alcohol history:   None x   Social    Chronic      Ambulates:   Independently x   w/cane    w/walker    w/wc    CODE STATUS:  DNR    Full x   Other      Objective:     Physical Exam:     Visit Vitals  /63   Pulse 78   Temp 97.3 °F (36.3 °C)   Resp 11   Ht 5' 6\" (1.676 m)   Wt 64.3 kg (141 lb 12.1 oz)   LMP 03/08/2022 (Approximate)   SpO2 94%   BMI 22.88 kg/m²      O2 Device: None (Room air)    General:  Alert, cooperative, no distress, appears stated age. Head:  Normocephalic, without obvious abnormality, atraumatic. Right cervical lymphadenopathy. Eyes:  Conjunctivae/corneas clear. PERRL, EOMs intact. Nose: Nares normal. Septum midline. Mucosa normal.        Neck: Supple, symmetrical, trachea midline. Lungs:   Clear to auscultation bilaterally. Chest wall:  No tenderness or deformity. Heart:  Regular rate and rhythm, S1, S2 normal, no murmur, click, rub or gallop. Abdomen:   Soft, non-tender. Bowel sounds normal. No masses,  No organomegaly. Extremities: Extremities normal, atraumatic, no cyanosis or edema. Pulses: 2+ and symmetric all extremities.    Skin: Skin color, texture, turgor normal. No rashes or lesions Neurologic: CNII-XII intact. Data Review:     Recent Days:  Recent Labs     03/16/22  1322   WBC 11.4*   HGB 11.3*   HCT 35.1        No results for input(s): NA, K, CL, CO2, GLU, BUN, CREA, CA, MG, PHOS, ALB, TBIL, ALT, INR, INREXT in the last 72 hours. No lab exists for component: SGOT  No results for input(s): PH, PCO2, PO2, HCO3, FIO2 in the last 72 hours. 24 Hour Results:  Recent Results (from the past 24 hour(s))   IONIZED CALCIUM    Collection Time: 03/16/22 12:19 PM   Result Value Ref Range    Calcium, ionized 1.20 1. 12 - 1.32 mmol/L   CBC WITH AUTOMATED DIFF    Collection Time: 03/16/22  1:22 PM   Result Value Ref Range    WBC 11.4 (H) 3.6 - 11.0 K/uL    RBC 3.92 3.80 - 5.20 M/uL    HGB 11.3 (L) 11.5 - 16.0 g/dL    HCT 35.1 35.0 - 47.0 %    MCV 89.5 80.0 - 99.0 FL    MCH 28.8 26.0 - 34.0 PG    MCHC 32.2 30.0 - 36.5 g/dL    RDW 14.2 11.5 - 14.5 %    PLATELET 493 876 - 019 K/uL    MPV 9.3 8.9 - 12.9 FL    NRBC 0.0 0  WBC    ABSOLUTE NRBC 0.00 0.00 - 0.01 K/uL    NEUTROPHILS 64 32 - 75 %    LYMPHOCYTES 25 12 - 49 %    MONOCYTES 8 5 - 13 %    EOSINOPHILS 3 0 - 7 %    BASOPHILS 0 0 - 1 %    IMMATURE GRANULOCYTES 0 0.0 - 0.5 %    ABS. NEUTROPHILS 7.3 1.8 - 8.0 K/UL    ABS. LYMPHOCYTES 2.8 0.8 - 3.5 K/UL    ABS. MONOCYTES 0.9 0.0 - 1.0 K/UL    ABS. EOSINOPHILS 0.3 0.0 - 0.4 K/UL    ABS. BASOPHILS 0.0 0.0 - 0.1 K/UL    ABS. IMM.  GRANS. 0.0 0.00 - 0.04 K/UL    DF AUTOMATED     HCG URINE, QL. - POC    Collection Time: 03/16/22  1:28 PM   Result Value Ref Range    Pregnancy test,urine (POC) Negative NEG     POC CHEM8    Collection Time: 03/16/22  1:32 PM   Result Value Ref Range    Calcium, ionized (POC) 1.31 1.12 - 1.32 mmol/L    Sodium (POC) 139 136 - 145 mmol/L    Potassium (POC) 4.3 3.5 - 5.1 mmol/L    Chloride (POC) 102 98 - 107 mmol/L    CO2 (POC) 29.2 21 - 32 mmol/L    Anion gap (POC) 9 (L) 10 - 20 mmol/L    Glucose (POC) 83 65 - 100 mg/dL    Creatinine (POC) 0.82 0.6 - 1.3 mg/dL GFRAA, POC >60 >60 ml/min/1.73m2    GFRNA, POC >60 >60 ml/min/1.73m2    Comment Comment Not Indicated. Imaging:     Assessment:     Reshma Jones is a 28 y.o. female with history of asthma, mood disorder, ADHD who is admitted for right peritonsillar abscess. Plan:       Right peritonsillar abscess  -Continue IV clindamycin  -Decadron every 8 hours x 6 doses  -Advance diet as tolerated  -ENT on consult. Appreciate recs.     Asthma  -DuoNebs as needed          FEN/GI -  [email or web address]  Activity - as tolerated  DVT prophylaxis -SCDs  GI prophylaxis -  NI  Disposition - Home    CODE STATUS:  Full code       Signed By: Dale Diehl MD     March 16, 2022

## 2022-03-17 NOTE — CONSULTS
Otolaryngology - Head & Neck Surgery Progress Note        PATIENT NAME: Christian Watts  MRN: [id number]  DATE: 3/16/2022  ADMISSION DATE: 3/16/2022      Subjective:     Asked to consult patient regarding right peritonsillar abscess. She developed throat pain about 3 days ago. Was seen in ER at [facility] last evening and diagnosed with suspected PTA. Given clindamycin. Was not able to see ENT in office today and returned to ER. CT neck showed right peritonsillar fluid collection. She was started on IV clindamycin and Decadron. Pain is right sided. No dyspnea. No prior history of frequent tonsillitis or abscess. Objective:     Visit Vitals  BP 95/74 (BP 1 Location: Right upper arm, BP Patient Position: At rest)   Pulse 89   Temp 98.2 °F (36.8 °C)   Resp 10   Ht 5' 6\" (1.676 m)   Wt 64.3 kg (141 lb 12.1 oz)   LMP 03/08/2022 (Approximate)   SpO2 95%   BMI 22.88 kg/m²     No intake/output data recorded. Physical Exam:     General - awake, alert, NAD. Thin. Head & face - wnl. No edema or erythema. CN 7 intact. Nose - clear anteriorly. Oral/oropharynx - no trismus. Oral mucosal clear. Tongue and floor of mouth soft. Uvula without edema or shift. Mild asymmetric fullness of right tonsil/peritonsillar soft palate but not bulging or erythematous. No exudate or purulent drainage. Neck - tender right level II region but no appreciable mass or adenopathy. PROCEDURE: Following discussion regarding the pathology and management of peritonsillar abscess, a decision was made to proceed with attempt at incision and drainage. Oropharynx topically anesthetized with Cetacaine spray followed by local injection with 1% lidocaine with epi using about 1.5 cc. 10 cc syringe with 18 g needle carefully advanced into right peritonsillar space just lateral to tonsil. Only about 1 cc of bloody/purulent material expressed. Three other passes and no other purulent material encountered.              Assessment:     Right peritonsillar early abscess versus phlegmon. Minimal amount of purulent material aspirated today. She tolerated the procedure well. Plan:     IV clindamycin x 48-72 hours. IV decadron x 6 doses. OK to advance diet as tolerated. Case discussed with Dr. Katharine Ty who will follow up with patient tomorrow.          Casa Kaba MD - [id number] [facility]  [phone] (office)  [phone] (cell)

## 2022-03-17 NOTE — PROGRESS NOTES
TRANSFER - IN REPORT:    Verbal report received from Ruby(name) on Fozia Thompson  being received from 6S(unit) for routine progression of care      Report consisted of patients Situation, Background, Assessment and   Recommendations(SBAR). Information from the following report(s) SBAR, Kardex, Intake/Output, MAR and Recent Results was reviewed with the receiving nurse. Opportunity for questions and clarification was provided. Assessment completed upon patients arrival to unit and care assumed.

## 2022-03-17 NOTE — PROGRESS NOTES
[facility] [facility] Adult  Hospitalist Group                                                                                          Hospitalist Progress Note  Jaime Armendariz MD  Answering service: [phone] from in house phone        Date of Service:  3/17/2022  NAME:  Venkata Hemphill  :  1990  MRN:  [id number]      Admission Summary:      27 yo female with PMHx of Asthma, Mood D/o, ADHD, Substance abuse on methadone admitted for sore throat and mild dysphagia. Interval history / Subjective:   Pt seen and examined  S/p I and D attempted at bedside, only 1cc fluid aspirated   Pain better today      Assessment & Plan:     Rt Peritonsillar Abscess   - Clinda IV and Decadron IV for 72 hours  - follow cultures  - Seen by ENT   - monitor     Polysubstance Abuse  - on Methadone, d/w Pharmacy           Code status: FULL  Prophylaxis: Lovenox   Care Plan discussed with: patient, RN, CM   Anticipated Disposition: home 48 hours      Hospital Problems  Date Reviewed: 3/12/2021          Codes Class Noted POA    Peritonsillar abscess ICD-10-CM: C56  ICD-9-CM: 485  3/16/2022 Unknown                Review of Systems:   A comprehensive review of systems was negative except for that written in the HPI. Vital Signs:    Last 24hrs VS reviewed since prior progress note. Most recent are:  Visit Vitals  /69   Pulse 84   Temp 98.3 °F (36.8 °C)   Resp 14   Ht 5' 6\" (1.676 m)   Wt 64.1 kg (141 lb 6.4 oz)   SpO2 98%   BMI 22.82 kg/m²       No intake or output data in the 24 hours ending 22 1308     Physical Examination:     I had a face to face encounter with this patient and independently examined them on 3/17/2022 as outlined below:          Constitutional:  No acute distress, cooperative, pleasant    ENT:  Oral mucosa moist, rt peritonsillar erythema, ++Rt submandibular tenderness   Resp:  CTA bilaterally. CV:  Regular rhythm, normal rate    GI:  Soft, non distended, non tender. normoactive bowel sounds,     Musculoskeletal:  No edema, warm, 2+ pulses throughout    Neurologic:  Moves all extremities. AAOx3, CN II-XII reviewed            Data Review:    Review and/or order of clinical lab test  Review and/or order of tests in the radiology section of C[facility]  Review and/or order of tests in the medicine section of [facility]      Labs:     Recent Labs     03/16/22  1322   WBC 11.4*   HGB 11.3*   HCT 35.1        No results for input(s): NA, K, CL, CO2, BUN, CREA, GLU, CA, MG, PHOS, URICA in the last 72 hours. No results for input(s): ALT, AP, TBIL, TBILI, TP, ALB, GLOB, GGT, AML, LPSE in the last 72 hours. No lab exists for component: SGOT, GPT, AMYP, HLPSE  No results for input(s): INR, PTP, APTT, INREXT in the last 72 hours. No results for input(s): FE, TIBC, PSAT, FERR in the last 72 hours. No results found for: FOL, RBCF   No results for input(s): PH, PCO2, PO2 in the last 72 hours. No results for input(s): CPK, CKNDX, TROIQ in the last 72 hours.     No lab exists for component: CPKMB  No results found for: CHOL, CHOLX, CHLST, CHOLV, HDL, HDLP, LDL, LDLC, DLDLP, TGLX, TRIGL, TRIGP, CHHD, CHHDX  Lab Results   Component Value Date/Time    Glucose (POC) 83 03/16/2022 01:32 PM    Glucose (POC) 98 08/26/2016 09:29 PM    Glucose  06/14/2013 11:37 AM     Lab Results   Component Value Date/Time    Color YELLOW/STRAW 10/26/2017 07:44 PM    Appearance CLOUDY (A) 10/26/2017 07:44 PM    Specific gravity 1.020 10/26/2017 07:44 PM    pH (UA) 6.0 10/26/2017 07:44 PM    Protein NEGATIVE  10/26/2017 07:44 PM    Glucose NEGATIVE  10/26/2017 07:44 PM    Ketone NEGATIVE  10/26/2017 07:44 PM    Bilirubin NEGATIVE  10/26/2017 07:44 PM    Urobilinogen 0.2 10/26/2017 07:44 PM    Nitrites NEGATIVE  10/26/2017 07:44 PM    Leukocyte Esterase NEGATIVE  10/26/2017 07:44 PM    Epithelial cells MANY (A) 10/26/2017 07:44 PM    Bacteria 3+ (A) 10/26/2017 07:44 PM    WBC 0-4 10/26/2017 07:44 PM    RBC 0-5 10/26/2017 07:44 PM         Medications Reviewed:     Current Facility-Administered Medications   Medication Dose Route Frequency    diphenhydrAMINE (BENADRYL) capsule 25 mg  25 mg Oral Q6H PRN    methadone (DOLOPHINE) 5 mg/5 mL oral solution 115 mg  115 mg Oral DAILY    butamben-tetracaine-benzocaine (CETACAINE) 2 %-2 %-14 % (200 mg/sec) topical spray 1 Spray  1 Spray Topical ONCE PRN    dexamethasone (PF) (DECADRON) 10 mg/mL injection 9 mg  9 mg IntraVENous Q8H    clindamycin (CLEOCIN) 900mg D5W 50mL IVPB (premix)  900 mg IntraVENous Q8H    sodium chloride (NS) flush 5-40 mL  5-40 mL IntraVENous Q8H    sodium chloride (NS) flush 5-40 mL  5-40 mL IntraVENous PRN    acetaminophen (TYLENOL) tablet 650 mg  650 mg Oral Q6H PRN    Or    acetaminophen (TYLENOL) suppository 650 mg  650 mg Rectal Q6H PRN    polyethylene glycol (MIRALAX) packet 17 g  17 g Oral DAILY PRN    ondansetron (ZOFRAN ODT) tablet 4 mg  4 mg Oral Q8H PRN    Or    ondansetron (ZOFRAN) injection 4 mg  4 mg IntraVENous Q6H PRN    0.9% sodium chloride infusion  125 mL/hr IntraVENous CONTINUOUS    ketorolac (TORADOL) injection 15 mg  15 mg IntraVENous Q6H PRN    morphine injection 2 mg  2 mg IntraVENous Q6H PRN     ______________________________________________________________________  EXPECTED LENGTH OF STAY: 2d 7h  ACTUAL LENGTH OF STAY:          1                 Kulwant Garcia MD

## 2022-03-17 NOTE — PROGRESS NOTES
I spoke with Mariangel Leone at [facility] to confirm [address] outpatient methadone dose of 115 mg PO once daily.   The last dose was given on 3/16/22 10:04 am.

## 2022-03-17 NOTE — CONSULTS
Patient with phlegmon tonsil - will need to stay in house for 72 hours for IV abx and IV steroids -   Likely will be able to dc home on Saturday evening    Armando Us MD

## 2022-03-17 NOTE — PROGRESS NOTES
Patient is medically stable to transfer to general care. She will continue with IV ABX for a total of 48 hours. Telemetry has been discontinued. Will transfer to 5th floor. Report called to RN.      Problem: General Infection Care Plan (Adult and Pediatric)  Goal: Improvement in signs and symptoms of infection  Outcome: Progressing Towards Goal  Goal: *Optimize nutritional status  Outcome: Progressing Towards Goal     Problem: Patient Education: Go to Patient Education Activity  Goal: Patient/Family Education  Outcome: Progressing Towards Goal

## 2022-03-17 NOTE — PROGRESS NOTES
VIDYA:  1. RUR-8%  2. Home with family assistance on discharge, family transport home. 3. Emergency contact- Mother, Peggy Boeck [phone]. Care Management Interventions  PCP Verified by CM: Yes  Palliative Care Criteria Met (RRAT>21 & CHF Dx)?: No  Mode of Transport at Discharge: Other (see comment) (family transport)  [address] [place]: Yes  Discharge Durable Medical Equipment: No  Health Maintenance Reviewed: Yes  Physical Therapy Consult: No  Occupational Therapy Consult: No  Speech Therapy Consult: No  Support Systems: Parent(s)  Confirm Follow Up Transport: Family  The Patient and/or Patient Representative was Provided with a Choice of Provider and Agrees with the Discharge Plan?: Yes  [place] Resource Information Provided?: No  Discharge Location  Patient Expects to be Discharged to[de-identified] Home with family assistance      Reason for Admission:  Peritonsillar abscess                      RUR Score:          8%           Plan for utilizing home health:      No indication at this time. PCP: Patient does not currently have a pcp, she would like a new pcp appointment in [place] area. Referral sent to Cm Specialist .                    Current Advanced Directive/Advance Care Plan: Full Code      Healthcare Decision Maker:   Click here to complete [employer] including selection of the Healthcare Decision Maker Relationship (ie \"Primary\")                             Transition of Care Plan:                    CM spoke with patient via telephone to explain role and offer support. She confirmed her demographics, pcp, and insurance information. The patient lives with her mom in a private residence. Patient is independent with ADLs and IADLs prior to admission. Her preferred pharmacy is [email or web address] on [address] [facility], and she does not have any issues obtaining medications in the past. The patient does not have any discharge concerns at this time. Cm available if any needs arise.  CM will send referral to CM Specialist for new pcp appointment.     Kandis Lowe [facility]

## 2022-03-18 LAB
BACTERIA SPEC CULT: NORMAL
SERVICE CMNT-IMP: NORMAL

## 2022-03-18 PROCEDURE — 74011000250 HC RX REV CODE- 250: Performed by: STUDENT IN AN ORGANIZED HEALTH CARE EDUCATION/TRAINING PROGRAM

## 2022-03-18 PROCEDURE — 74011250636 HC RX REV CODE- 250/636: Performed by: HOSPITALIST

## 2022-03-18 PROCEDURE — 74011250636 HC RX REV CODE- 250/636: Performed by: OTOLARYNGOLOGY

## 2022-03-18 PROCEDURE — 74011250637 HC RX REV CODE- 250/637: Performed by: NURSE PRACTITIONER

## 2022-03-18 PROCEDURE — 65270000029 HC RM PRIVATE

## 2022-03-18 PROCEDURE — 74011250637 HC RX REV CODE- 250/637: Performed by: HOSPITALIST

## 2022-03-18 RX ADMIN — ENOXAPARIN SODIUM 40 MG: 100 INJECTION SUBCUTANEOUS at 14:32

## 2022-03-18 RX ADMIN — DIPHENHYDRAMINE HYDROCHLORIDE 25 MG: 25 CAPSULE ORAL at 21:28

## 2022-03-18 RX ADMIN — DEXAMETHASONE SODIUM PHOSPHATE 9 MG: 10 INJECTION INTRAMUSCULAR; INTRAVENOUS at 14:51

## 2022-03-18 RX ADMIN — DEXAMETHASONE SODIUM PHOSPHATE 9 MG: 10 INJECTION INTRAMUSCULAR; INTRAVENOUS at 05:28

## 2022-03-18 RX ADMIN — CLINDAMYCIN IN 5 PERCENT DEXTROSE 900 MG: 18 INJECTION, SOLUTION INTRAVENOUS at 02:09

## 2022-03-18 RX ADMIN — DIPHENHYDRAMINE HYDROCHLORIDE 25 MG: 25 CAPSULE ORAL at 14:54

## 2022-03-18 RX ADMIN — SODIUM CHLORIDE, PRESERVATIVE FREE 10 ML: 5 INJECTION INTRAVENOUS at 17:26

## 2022-03-18 RX ADMIN — SODIUM CHLORIDE, PRESERVATIVE FREE 5 ML: 5 INJECTION INTRAVENOUS at 22:00

## 2022-03-18 RX ADMIN — DEXAMETHASONE SODIUM PHOSPHATE 9 MG: 10 INJECTION INTRAMUSCULAR; INTRAVENOUS at 21:28

## 2022-03-18 RX ADMIN — CLINDAMYCIN IN 5 PERCENT DEXTROSE 900 MG: 18 INJECTION, SOLUTION INTRAVENOUS at 09:50

## 2022-03-18 RX ADMIN — CLINDAMYCIN IN 5 PERCENT DEXTROSE 900 MG: 18 INJECTION, SOLUTION INTRAVENOUS at 17:25

## 2022-03-18 RX ADMIN — DIPHENHYDRAMINE HYDROCHLORIDE 25 MG: 25 CAPSULE ORAL at 02:17

## 2022-03-18 RX ADMIN — METHADONE HYDROCHLORIDE 115 MG: 5 SOLUTION ORAL at 09:50

## 2022-03-18 NOTE — PROGRESS NOTES
Problem: General Infection Care Plan (Adult and Pediatric)  Goal: Improvement in signs and symptoms of infection  Outcome: Progressing Towards Goal     Problem: Patient Education: Go to Patient Education Activity  Goal: Patient/Family Education  Outcome: Progressing Towards Goal

## 2022-03-18 NOTE — PROGRESS NOTES
[facility] [facility] Adult  Hospitalist Group                                                                                          Hospitalist Progress Note  Gertrude Morris MD  Answering service: [phone] OR [phone] from in house phone        Date of Service:  3/18/2022  NAME:  Spike London  :  1990  MRN:  [id number]      Admission Summary:      29 yo female with PMHx of Asthma, Mood D/o, ADHD, Substance abuse on methadone admitted for sore throat and mild dysphagia. Interval history / Subjective:   Pt seen and examined  S/p I and D attempted at bedside, only 1cc fluid aspirated   Improving  Minimal pain      Assessment & Plan:     Rt Peritonsillar Abscess   - Clinda IV and Decadron IV for 24 more hours  - follow cultures  - Seen by ENT   - monitor     Polysubstance Abuse  - on Methadone, d/w Pharmacy           Code status: FULL  Prophylaxis: Lovenox   Care Plan discussed with: patient, RN, CM   Anticipated Disposition: home tomorrow      Hospital Problems  Date Reviewed: 3/12/2021          Codes Class Noted POA    Peritonsillar abscess ICD-10-CM: G76  ICD-9-CM: 490  3/16/2022 Unknown                Review of Systems:   A comprehensive review of systems was negative except for that written in the HPI. Vital Signs:    Last 24hrs VS reviewed since prior progress note. Most recent are:  Visit Vitals  /80 (BP 1 Location: Right arm, BP Patient Position: At rest)   Pulse 73   Temp 98.5 °F (36.9 °C)   Resp 18   Ht 5' 6\" (1.676 m)   Wt 64.1 kg (141 lb 6.4 oz)   SpO2 98%   BMI 22.82 kg/m²       No intake or output data in the 24 hours ending 22 1012     Physical Examination:     I had a face to face encounter with this patient and independently examined them on 3/18/2022 as outlined below:          Constitutional:  No acute distress, cooperative, pleasant    ENT:  Oral mucosa moist, rt peritonsillar erythema, +Rt submandibular tenderness- improved    Resp:  CTA bilaterally. CV:  Regular rhythm, normal rate    GI:  Soft, non distended, non tender. normoactive bowel sounds,     Musculoskeletal:  No edema, warm, 2+ pulses throughout    Neurologic:  Moves all extremities. AAOx3, CN II-XII reviewed            Data Review:    Review and/or order of clinical lab test  Review and/or order of tests in the radiology section of [facility]  Review and/or order of tests in the medicine section of [facility]      Labs:     Recent Labs     03/16/22  1322   WBC 11.4*   HGB 11.3*   HCT 35.1        No results for input(s): NA, K, CL, CO2, BUN, CREA, GLU, CA, MG, PHOS, URICA in the last 72 hours. No results for input(s): ALT, AP, TBIL, TBILI, TP, ALB, GLOB, GGT, AML, LPSE in the last 72 hours. No lab exists for component: SGOT, GPT, AMYP, HLPSE  No results for input(s): INR, PTP, APTT, INREXT, INREXT in the last 72 hours. No results for input(s): FE, TIBC, PSAT, FERR in the last 72 hours. No results found for: FOL, RBCF   No results for input(s): PH, PCO2, PO2 in the last 72 hours. No results for input(s): CPK, CKNDX, TROIQ in the last 72 hours.     No lab exists for component: CPKMB  No results found for: CHOL, CHOLX, CHLST, CHOLV, HDL, HDLP, LDL, LDLC, DLDLP, TGLX, TRIGL, TRIGP, CHHD, CHHDX  Lab Results   Component Value Date/Time    Glucose (POC) 83 03/16/2022 01:32 PM    Glucose (POC) 98 08/26/2016 09:29 PM    Glucose  06/14/2013 11:37 AM     Lab Results   Component Value Date/Time    Color YELLOW/STRAW 10/26/2017 07:44 PM    Appearance CLOUDY (A) 10/26/2017 07:44 PM    Specific gravity 1.020 10/26/2017 07:44 PM    pH (UA) 6.0 10/26/2017 07:44 PM    Protein NEGATIVE  10/26/2017 07:44 PM    Glucose NEGATIVE  10/26/2017 07:44 PM    Ketone NEGATIVE  10/26/2017 07:44 PM    Bilirubin NEGATIVE  10/26/2017 07:44 PM    Urobilinogen 0.2 10/26/2017 07:44 PM    Nitrites NEGATIVE  10/26/2017 07:44 PM    Leukocyte Esterase NEGATIVE  10/26/2017 07:44 PM    Epithelial cells MANY (A) 10/26/2017 07:44 PM Bacteria 3+ (A) 10/26/2017 07:44 PM    WBC 0-4 10/26/2017 07:44 PM    RBC 0-5 10/26/2017 07:44 PM         Medications Reviewed:     Current Facility-Administered Medications   Medication Dose Route Frequency    diphenhydrAMINE (BENADRYL) capsule 25 mg  25 mg Oral Q6H PRN    methadone (DOLOPHINE) 5 mg/5 mL oral solution 115 mg  115 mg Oral DAILY    albuterol-ipratropium (DUO-NEB) 2.5 MG-0.5 MG/3 ML  3 mL Nebulization Q4H PRN    enoxaparin (LOVENOX) injection 40 mg  40 mg SubCUTAneous Q24H    dexamethasone (PF) (DECADRON) 10 mg/mL injection 9 mg  9 mg IntraVENous Q8H    clindamycin (CLEOCIN) 900mg D5W 50mL IVPB (premix)  900 mg IntraVENous Q8H    sodium chloride (NS) flush 5-40 mL  5-40 mL IntraVENous Q8H    sodium chloride (NS) flush 5-40 mL  5-40 mL IntraVENous PRN    acetaminophen (TYLENOL) tablet 650 mg  650 mg Oral Q6H PRN    Or    acetaminophen (TYLENOL) suppository 650 mg  650 mg Rectal Q6H PRN    polyethylene glycol (MIRALAX) packet 17 g  17 g Oral DAILY PRN    ondansetron (ZOFRAN ODT) tablet 4 mg  4 mg Oral Q8H PRN    Or    ondansetron (ZOFRAN) injection 4 mg  4 mg IntraVENous Q6H PRN    0.9% sodium chloride infusion  125 mL/hr IntraVENous CONTINUOUS    ketorolac (TORADOL) injection 15 mg  15 mg IntraVENous Q6H PRN     ______________________________________________________________________  EXPECTED LENGTH OF STAY: 2d 7h  ACTUAL LENGTH OF STAY:          2                 Lizzy Palmer MD

## 2022-03-18 NOTE — PROGRESS NOTES
Problem: General Infection Care Plan (Adult and Pediatric)  Goal: Improvement in signs and symptoms of infection  Outcome: Progressing Towards Goal  Goal: *Optimize nutritional status  Outcome: Progressing Towards Goal     Problem: Patient Education: Go to Patient Education Activity  Goal: Patient/Family Education  Outcome: Progressing Towards Goal

## 2022-03-19 VITALS
SYSTOLIC BLOOD PRESSURE: 146 MMHG | HEART RATE: 76 BPM | BODY MASS INDEX: 22.73 KG/M2 | HEIGHT: 66 IN | TEMPERATURE: 98.5 F | DIASTOLIC BLOOD PRESSURE: 86 MMHG | RESPIRATION RATE: 18 BRPM | OXYGEN SATURATION: 97 % | WEIGHT: 141.4 LBS

## 2022-03-19 PROBLEM — F11.20 OPIATE DEPENDENCE (HCC): Status: ACTIVE | Noted: 2017-10-27

## 2022-03-19 PROBLEM — F19.20 POLYSUBSTANCE DEPENDENCE (HCC): Status: ACTIVE | Noted: 2017-10-27

## 2022-03-19 PROBLEM — F19.94 SUBSTANCE INDUCED MOOD DISORDER (HCC): Status: ACTIVE | Noted: 2017-10-26

## 2022-03-19 PROCEDURE — 74011250637 HC RX REV CODE- 250/637: Performed by: HOSPITALIST

## 2022-03-19 PROCEDURE — 74011250636 HC RX REV CODE- 250/636: Performed by: OTOLARYNGOLOGY

## 2022-03-19 PROCEDURE — 74011250636 HC RX REV CODE- 250/636: Performed by: HOSPITALIST

## 2022-03-19 PROCEDURE — 74011250637 HC RX REV CODE- 250/637: Performed by: NURSE PRACTITIONER

## 2022-03-19 RX ORDER — CLINDAMYCIN HYDROCHLORIDE 300 MG/1
300 CAPSULE ORAL 4 TIMES DAILY
Qty: 44 CAPSULE | Refills: 0 | Status: SHIPPED | OUTPATIENT
Start: 2022-03-19 | End: 2022-03-30

## 2022-03-19 RX ORDER — OXYCODONE AND ACETAMINOPHEN 5; 325 MG/1; MG/1
1 TABLET ORAL
Qty: 6 TABLET | Refills: 0 | Status: SHIPPED
Start: 2022-03-19 | End: 2022-03-21

## 2022-03-19 RX ORDER — PREDNISONE 10 MG/1
TABLET ORAL
Qty: 20 TABLET | Refills: 0 | OUTPATIENT
Start: 2022-03-19 | End: 2022-08-21

## 2022-03-19 RX ADMIN — CLINDAMYCIN IN 5 PERCENT DEXTROSE 900 MG: 18 INJECTION, SOLUTION INTRAVENOUS at 02:12

## 2022-03-19 RX ADMIN — DIPHENHYDRAMINE HYDROCHLORIDE 25 MG: 25 CAPSULE ORAL at 13:59

## 2022-03-19 RX ADMIN — DEXAMETHASONE SODIUM PHOSPHATE 9 MG: 10 INJECTION INTRAMUSCULAR; INTRAVENOUS at 07:14

## 2022-03-19 RX ADMIN — DIPHENHYDRAMINE HYDROCHLORIDE 25 MG: 25 CAPSULE ORAL at 04:37

## 2022-03-19 RX ADMIN — ENOXAPARIN SODIUM 40 MG: 100 INJECTION SUBCUTANEOUS at 13:59

## 2022-03-19 RX ADMIN — DEXAMETHASONE SODIUM PHOSPHATE 9 MG: 10 INJECTION INTRAMUSCULAR; INTRAVENOUS at 13:59

## 2022-03-19 RX ADMIN — METHADONE HYDROCHLORIDE 115 MG: 5 SOLUTION ORAL at 09:43

## 2022-03-19 RX ADMIN — CLINDAMYCIN IN 5 PERCENT DEXTROSE 900 MG: 18 INJECTION, SOLUTION INTRAVENOUS at 09:48

## 2022-03-19 NOTE — ED PROVIDER NOTES
History of ADHD, asthma. She presents accompanied by her friend with complaints of a 3-day history of sore throat. It is worse on the right. She feels a swollen gland in her right neck. She denies fever or other complaints. She has had difficulty eating and drinking due to the pain. Her symptoms are moderate without relieving factors. Past Medical History:   Diagnosis Date    ADHD (attention deficit hyperactivity disorder)     Asthma     Headache     Substance induced mood disorder (HCC)        No past surgical history on file. Family History:   Problem Relation Age of Onset   Denis Asthma Mother     Hypertension Mother     Asthma Sister     Hypertension Sister     Attention Deficit Hyperactivity Disorder Sister     Migraines Sister     Asthma Brother     Hypertension Brother     Depression Brother     Clotting Disorder Brother     Migraines Brother        Social History     Socioeconomic History    Marital status: SINGLE     Spouse name: Not on file    Number of children: Not on file    Years of education: Not on file    Highest education level: Not on file   Occupational History    Not on file   Tobacco Use    Smoking status: Current Every Day Smoker     Packs/day: 1.00    Smokeless tobacco: Never Used    Tobacco comment: pt states she vapes   Vaping Use    Vaping Use: Not on file   Substance and Sexual Activity    Alcohol use: Never    Drug use: Not Currently     Types: Marijuana, Other, Heroin     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 Was on disability but recently lost her disability. No legal problem reported. Started seeing psychiatrist- age 7 behavior problem.  Reports recent psych hospitalization at [address] [place] [address] for possible OD/drug abuse    No abuse reported. Social Determinants of Health     Financial Resource Strain:     Difficulty of Paying Living Expenses: Not on file   Food Insecurity:     Worried About Running Out of Food in the Last Year: Not on file    Jamel of Food in the Last Year: Not on file   Transportation Needs:     Lack of Transportation (Medical): Not on file    Lack of Transportation (Non-Medical): Not on file   Physical Activity:     Days of Exercise per Week: Not on file    Minutes of Exercise per Session: Not on file   Stress:     Feeling of Stress : Not on file   Social Connections:     Frequency of Communication with Friends and Family: Not on file    Frequency of Social Gatherings with Friends and Family: Not on file    Attends [employer]ism Services: Not on file    Active Member of [address] [place] [address] [employer] or Organizations: Not on file    Attends Club or Organization Meetings: Not on file    Marital Status: Not on file   Intimate Partner Violence:     Fear of Current or Ex-Partner: Not on file    Emotionally Abused: Not on file    Physically Abused: Not on file    Sexually Abused: Not on file   Housing Stability:     Unable to Pay for Housing in the Last Year: Not on file    Number of [place] in the Last Year: Not on file    Unstable Housing in the Last Year: Not on file         ALLERGIES: Patient has no known allergies. Review of Systems   All other systems reviewed and are negative. Vitals:    03/15/22 2113   BP: 123/80   Pulse: 100   Resp: 16   Temp: 99.1 °F (37.3 °C)   SpO2: 97%   Weight: 64.3 kg (141 lb 12.1 oz)            Physical Exam  Vitals and nursing note reviewed. Constitutional:       Appearance: She is well-developed. Comments: Nontoxic appearance. HENT:      Head: Normocephalic and atraumatic. Mouth/Throat:      Comments: Right peritonsillar abscess noted. No trismus. Maintaining her secretions.   Eyes:      Conjunctiva/sclera: Conjunctivae normal.   Neck: Trachea: No tracheal deviation. Cardiovascular:      Rate and Rhythm: Normal rate and regular rhythm. Heart sounds: Normal heart sounds. No murmur heard. No friction rub. No gallop. Pulmonary:      Effort: Pulmonary effort is normal.      Breath sounds: Normal breath sounds. Abdominal:      Palpations: Abdomen is soft. Tenderness: There is no abdominal tenderness. Musculoskeletal:         General: No deformity. Cervical back: Neck supple. Skin:     General: Skin is warm and dry. Neurological:      Mental Status: She is alert. Comments: oriented          MDM       Procedures    Assessment/plan: Right peritonsillar abscess. She has a nontoxic appearance and is maintaining her secretions without difficulty. No voice changes noted. Reassuring appearance/exam with stable vital signs. I have prescribed clindamycin and limited Percocet. She understands the importance of close follow-up with an ENT doctor tomorrow morning. I have instructed her to return to the ED if she has any trouble getting in to see the ENT doctor or if her symptoms worsen.   Leslie Castro MD

## 2022-03-19 NOTE — PROGRESS NOTES
Problem: General Infection Care Plan (Adult and Pediatric)  Goal: Improvement in signs and symptoms of infection  Outcome: Progressing Towards Goal  Goal: *Optimize nutritional status  Outcome: Progressing Towards Goal     Problem: Falls - Risk of  Goal: *Absence of Falls  Description: Document Shanique Fall Risk and appropriate interventions in the flowsheet.   Outcome: Progressing Towards Goal  Note: Fall Risk Interventions:

## 2022-03-19 NOTE — DISCHARGE INSTRUCTIONS
Please bring this form with you to show your primary care provider at your follow-up appointment. Primary care provider:  Dr. Arce Board    Discharging provider:  Abhijeet Peralta MD    You have been admitted to the hospital with the following diagnoses:  · Peritonsillar abscess [J36]    FOLLOW-UP CARE RECOMMENDATIONS:    APPOINTMENTS:  Follow-up Information     Follow up With Specialties Details Why Contact Info    [address]   go now to [facility] Emergency Room. [address] [facility]    Marylou Hernández NP Nurse Practitioner On 4/5/2022 Hospital follow up new primary care appointment Tuesday, 4/5/22 9:30 a.m. Please arrive 15 minutes early with photo ID, insurance card and a listing of all medications. [address] [place] [address]  [phone]      Willie Gutierrez MD Otolaryngology In 1 week Discharge follow up , call to make appointment  [address]  [phone]              FOLLOW-UP TESTS recommended: NONE    PENDING TEST RESULTS:  At the time of your discharge the following test results are still pending: none  Please make sure you review these results with your outpatient follow-up provider(s). SYMPTOMS to watch for: chest pain, shortness of breath, fever, chills, nausea, vomiting, diarrhea, change in mentation, falling, weakness, bleeding. DIET/what to eat:  Regular Diet    ACTIVITY:  Activity as tolerated    WOUND CARE: NONE    EQUIPMENT needed:  NONE      What to do if new or unexpected symptoms occur? If you experience any of the above symptoms (or should other concerns or questions arise after discharge) please call your primary care physician. Return to the emergency room if you cannot get hold of your doctor. · It is very important that you keep your follow-up appointment(s).   · Please bring discharge papers, medication list (and/or medication bottles) to your follow-up appointments for review by your outpatient provider(s). · Please check the list of medications and be sure it includes every medication (even non-prescription medications) that your provider wants you to take. · It is important that you take the medication exactly as they are prescribed. · Keep your medication in the bottles provided by the pharmacist and keep a list of the medication names, dosages, and times to be taken in your wallet. · Do not take other medications without consulting your doctor. · If you have any questions about your medications or other instructions, please talk to your nurse or care provider before you leave the hospital.    I understand that if any problems occur once I am at home I am to contact my physician. These instructions were explained to me and I had the opportunity to ask questions.

## 2022-03-19 NOTE — DISCHARGE SUMMARY
Discharge Summary     Patient:  Lilly Laguna       MRN: [id number]       YOB: 1990       Age: 28 y.o. Date of admission:  3/16/2022    Date of discharge:  3/19/2022    Primary care provider: Dr. Tracy Mullen    Admitting provider:  Shannon Johnston MD    Discharging provider:  Rolando Torres MD - [phone]  If unavailable, call [phone] and ask the  to page the triage hospitalist.    Consultations  · IP CONSULT TO OTOLARYNGOLOGY  · IP CONSULT TO HOSPITALIST    Procedures  · * No surgery found *    Discharge destination: HOME. The patient is stable for discharge. Admission diagnosis  · Peritonsillar abscess [J36]    Current Discharge Medication List      START taking these medications    Details   predniSONE (DELTASONE) 10 mg tablet Take 4 tabs daily X 2 days then 3 tabs daily X 2 days then 2 tabs daily X 2 days then 1 tab daily X 2 days. Qty: 20 Tablet, Refills: 0  Start date: 3/19/2022         CONTINUE these medications which have CHANGED    Details   oxyCODONE-acetaminophen (Percocet) 5-325 mg per tablet Take 1 Tablet by mouth every six (6) hours as needed for Pain for up to 2 days. Max Daily Amount: 4 Tablets. Qty: 6 Tablet, Refills: 0  Start date: 3/19/2022, End date: 3/21/2022    Associated Diagnoses: Peritonsillar abscess      clindamycin (CLEOCIN) 300 mg capsule Take 1 Capsule by mouth four (4) times daily for 11 days. Qty: 44 Capsule, Refills: 0  Start date: 3/19/2022, End date: 3/30/2022         CONTINUE these medications which have NOT CHANGED    Details   methadone (DOLOPHINE) 10 mg/mL solution Take 115 mg by mouth daily. Indications: dependence on opioid-type drugs      fluticasone propionate (FLONASE) 50 mcg/actuation nasal spray 2 Sprays by Both Nostrils route daily.  Indications: inflammation of the nose due to an allergy  Qty: 3 Bottle, Refills: 3    Associated Diagnoses: Allergic rhinitis, unspecified seasonality, unspecified trigger      albuterol (PROVENTIL HFA, VENTOLIN HFA, PROAIR HFA) 90 mcg/actuation inhaler Take 2 Puffs by inhalation every four (4) hours as needed for Wheezing. Qty: 1 Inhaler, Refills: 1             Follow-up Information     Follow up With Specialties Details Why Contact Info    [address]   go now to [facility] Emergency Room. [address] [facility]    Ori Barnett NP Nurse Practitioner On 4/5/2022 Hospital follow up new primary care appointment Tuesday, 4/5/22 9:30 a.m. Please arrive 15 minutes early with photo ID, insurance card and a listing of all medications. [address] [facility] [address] [phone]      Rosana Burrell MD Otolaryngology In 1 week Discharge follow up , call to make appointment  [address] [facility]  [address] [facility]  [phone]            Final discharge diagnoses and brief hospital course  Please also refer to the admission H&P for details on the presenting problem.     34 yo female with PMHx of Asthma, Mood D/o, ADHD, Substance abuse on methadone admitted for sore throat and mild dysphagia.      Rt Peritonsillar Abscess   - Clinda IV and Decadron IV given inpatient  - discharge on Clinda for 11 more days and prednisone taper  - Strep negative,   - attempted bedside I and D but only able to aspirate 1cc  - Seen by ENT, outpatient follow up   - throat culture: normal marii      Polysubstance Abuse  - on Methadone, d/w Pharmacy       FOLLOW-UP TESTS recommended: NONE     PENDING TEST RESULTS:  At the time of your discharge the following test results are still pending: none  Please make sure you review these results with your outpatient follow-up provider(s).     SYMPTOMS to watch for: chest pain, shortness of breath, fever, chills, nausea, vomiting, diarrhea, change in mentation, falling, weakness, bleeding.     DIET/what to eat:  Regular Diet     ACTIVITY:  Activity as tolerated     WOUND CARE: NONE     EQUIPMENT needed:  NONE       Physical examination at discharge  Visit Vitals  BP (!) 144/87 (BP 1 Location: Right upper arm, BP Patient Position: At rest;Sitting)   Pulse 63   Temp 97.4 °F (36.3 °C)   Resp 18   Ht 5' 6\" (1.676 m)   Wt 64.1 kg (141 lb 6.4 oz)   SpO2 99%   BMI 22.82 kg/m²     Ao3  HEENT: Rt submandibular mild tenderness  Lung clear  CVS: RRR  Abd: soft NT ND   Ext: no edema    Pertinent imaging studies:    CT Neck  IMPRESSION     Right peritonsillar abscess. Concern for developing right retropharyngeal fluid collection. Recent Labs     03/16/22  1322   WBC 11.4*   HGB 11.3*   HCT 35.1        No results for input(s): NA, K, CL, CO2, BUN, CREA, GLU, CA, MG, PHOS, URICA in the last 72 hours. No results for input(s): AP, TBIL, TP, ALB, GLOB, GGT, AML, LPSE in the last 72 hours. No lab exists for component: SGOT, GPT, AMYP, HLPSE  No results for input(s): INR, PTP, APTT, INREXT in the last 72 hours. No results for input(s): FE, TIBC, PSAT, FERR in the last 72 hours. No results for input(s): PH, PCO2, PO2 in the last 72 hours. No results for input(s): CPK, CKMB in the last 72 hours.     No lab exists for component: TROPONINI  No components found for: [place]    Chronic Diagnoses:    Problem List as of 3/19/2022 Date Reviewed: 3/12/2021          Codes Class Noted - Resolved    Peritonsillar abscess ICD-10-CM: J36  ICD-9-CM: 137  3/16/2022 - Present        Opiate dependence ([facility] ICD-10-CM: F11.20  ICD-9-CM: 304.00  10/27/2017 - Present        Polysubstance dependence ([facility] ICD-10-CM: F19.20  ICD-9-CM: 304.80  10/27/2017 - Present        Substance induced mood disorder (HCC) ICD-10-CM: U33.43  ICD-9-CM: 292.84  10/26/2017 - Present        ADHD (attention deficit hyperactivity disorder) ICD-10-CM: F90.9  ICD-9-CM: 314.01  6/14/2013 - Present              Time spent on discharge related activities today greater than 30 minutes.       Signed:  Dulce Jacinto MD                 Hospitalist, Internal Medicine      Cc: None

## 2022-03-24 PROBLEM — J36 PERITONSILLAR ABSCESS: Status: ACTIVE | Noted: 2022-03-16

## 2022-04-05 ENCOUNTER — TELEPHONE (OUTPATIENT)
Dept: PRIMARY CARE CLINIC | Age: 32
End: 2022-04-05

## 2022-04-05 NOTE — TELEPHONE ENCOUNTER
This writer call pt at [phone] to see if they are close by and someone answer the phone and said that pt had a death in the family and that she was out of town and would not be able to make it to the visit.

## 2022-06-30 ENCOUNTER — HOSPITAL ENCOUNTER (EMERGENCY)
Age: 32
Discharge: HOME | End: 2022-06-30
Attending: EMERGENCY MEDICINE
Payer: MEDICAID

## 2022-06-30 VITALS
WEIGHT: 154.54 LBS | HEART RATE: 68 BPM | HEIGHT: 65 IN | TEMPERATURE: 98.1 F | BODY MASS INDEX: 25.75 KG/M2 | RESPIRATION RATE: 16 BRPM | SYSTOLIC BLOOD PRESSURE: 111 MMHG | DIASTOLIC BLOOD PRESSURE: 76 MMHG | OXYGEN SATURATION: 100 %

## 2022-06-30 DIAGNOSIS — R51.9 NONINTRACTABLE EPISODIC HEADACHE, UNSPECIFIED HEADACHE TYPE: ICD-10-CM

## 2022-06-30 DIAGNOSIS — G43.809 OTHER MIGRAINE WITHOUT STATUS MIGRAINOSUS, NOT INTRACTABLE: Primary | ICD-10-CM

## 2022-06-30 PROCEDURE — 96374 THER/PROPH/DIAG INJ IV PUSH: CPT

## 2022-06-30 PROCEDURE — 74011250636 HC RX REV CODE- 250/636: Performed by: EMERGENCY MEDICINE

## 2022-06-30 PROCEDURE — 99284 EMERGENCY DEPT VISIT MOD MDM: CPT

## 2022-06-30 PROCEDURE — 96375 TX/PRO/DX INJ NEW DRUG ADDON: CPT

## 2022-06-30 RX ORDER — DEXAMETHASONE SODIUM PHOSPHATE 10 MG/ML
12 INJECTION INTRAMUSCULAR; INTRAVENOUS ONCE
Status: COMPLETED | OUTPATIENT
Start: 2022-06-30 | End: 2022-06-30

## 2022-06-30 RX ORDER — DIPHENHYDRAMINE HYDROCHLORIDE 50 MG/ML
50 INJECTION, SOLUTION INTRAMUSCULAR; INTRAVENOUS
Status: COMPLETED | OUTPATIENT
Start: 2022-06-30 | End: 2022-06-30

## 2022-06-30 RX ORDER — METOCLOPRAMIDE HYDROCHLORIDE 5 MG/ML
10 INJECTION INTRAMUSCULAR; INTRAVENOUS
Status: COMPLETED | OUTPATIENT
Start: 2022-06-30 | End: 2022-06-30

## 2022-06-30 RX ADMIN — DIPHENHYDRAMINE HYDROCHLORIDE 50 MG: 50 INJECTION INTRAMUSCULAR; INTRAVENOUS at 12:15

## 2022-06-30 RX ADMIN — DEXAMETHASONE SODIUM PHOSPHATE 12 MG: 10 INJECTION, SOLUTION INTRAMUSCULAR; INTRAVENOUS at 12:18

## 2022-06-30 RX ADMIN — SODIUM CHLORIDE 1000 ML: 9 INJECTION, SOLUTION INTRAVENOUS at 12:14

## 2022-06-30 RX ADMIN — METOCLOPRAMIDE HYDROCHLORIDE 10 MG: 5 INJECTION INTRAMUSCULAR; INTRAVENOUS at 12:15

## 2022-06-30 NOTE — ED TRIAGE NOTES
Patient arrives ambulatory to the ED with complaints of headache and nausea. States it began about 2 days ago.  States she took 6 Motrin today around 10am.

## 2022-06-30 NOTE — ED PROVIDER NOTES
Date of Service:  6/30/2022    Patient:  Link Dunaway    Chief Complaint:  Headache and Nausea       HPI:  Link Dunaway is a 28 y.o.  female who presents for evaluation of headache. Patient has a history of migraines. She states for the last week she has been having on and off headaches, described in general all over discomfort with some light noise sensitivity. Patient took ibuprofen this morning and some Fioricet with no relief so she came to the ER. Patient is not a thunderclap headache, nor is it at its max intensity. She has had worse. Headaches been waxing and waning over the last several days, present sometimes, absence at some points as well. Patient states that she is currently moving, is not been drinking of water and is currently under a lot of stress. She smokes, denies drug use and denies other acute complaints specifically any type of neck pain back pain fever or weight loss           Past Medical History:   Diagnosis Date    ADHD (attention deficit hyperactivity disorder)     Asthma     Headache     Substance induced mood disorder ([facility]        History reviewed. No pertinent surgical history.       Family History:   Problem Relation Age of Onset   Lynn Her Asthma Mother     Hypertension Mother     Asthma Sister     Hypertension Sister     Attention Deficit Hyperactivity Disorder Sister     Migraines Sister     Asthma Brother     Hypertension Brother     Depression Brother     Clotting Disorder Brother     Migraines Brother        Social History     Socioeconomic History    Marital status: SINGLE     Spouse name: Not on file    Number of children: Not on file    Years of education: Not on file    Highest education level: Not on file   Occupational History    Not on file   Tobacco Use    Smoking status: Current Every Day Smoker     Packs/day: 1.00    Smokeless tobacco: Never Used    Tobacco comment: pt states she vapes   Vaping Use    Vaping Use: Not on file   Substance and Sexual Activity    Alcohol use: Never    Drug use: Not Currently     Types: Marijuana, Other, Heroin     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 Was on disability but recently lost her disability. No legal problem reported. Started seeing psychiatrist- age 7 behavior problem. Reports recent psych hospitalization at [address] [place] [address] for possible OD/drug abuse    No abuse reported. Social Determinants of Health     Financial Resource Strain:     Difficulty of Paying Living Expenses: Not on file   Food Insecurity:     Worried About Running Out of Food in the Last Year: Not on file    Jamel of Food in the Last Year: Not on file   Transportation Needs:     Lack of Transportation (Medical): Not on file    Lack of Transportation (Non-Medical):  Not on file   Physical Activity:     Days of Exercise per Week: Not on file    Minutes of Exercise per Session: Not on file   Stress:     Feeling of Stress : Not on file   Social Connections:     Frequency of Communication with Friends and Family: Not on file    Frequency of Social Gatherings with Friends and Family: Not on file    Attends Alevism Services: Not on file    Active Member of [address] [place] [address] [employer] or Organizations: Not on file    Attends Club or Organization Meetings: Not on file    Marital Status: Not on file   Intimate Partner Violence:     Fear of Current or Ex-Partner: Not on file    Emotionally Abused: Not on file    Physically Abused: Not on file    Sexually Abused: Not on file   Housing Stability:     Unable to Pay for Housing in the Last Year: Not on file    Number of [place] in the Last Year: Not on file    Unstable Housing in the Last Year: Not on file         ALLERGIES: Patient has no known allergies. Review of Systems   Neurological: Positive for headaches. All other systems reviewed and are negative. Vitals:    06/30/22 1147 06/30/22 1201   BP: 121/86    Pulse: 93    Resp:  16   Temp: 98.1 °F (36.7 °C)    SpO2: 100%    Weight: 70.1 kg (154 lb 8.7 oz)    Height: 5' 5\" (1.651 m)             Physical Exam  Vitals and nursing note reviewed. Constitutional:       Appearance: Normal appearance. HENT:      Head: Normocephalic and atraumatic. Right Ear: Tympanic membrane normal.      Left Ear: Tympanic membrane normal.      Nose: Nose normal.   Cardiovascular:      Rate and Rhythm: Normal rate and regular rhythm. Pulmonary:      Effort: Pulmonary effort is normal.      Breath sounds: Normal breath sounds. Abdominal:      General: Abdomen is flat. Musculoskeletal:         General: No deformity. Cervical back: No rigidity. Skin:     General: Skin is warm. Capillary Refill: Capillary refill takes less than 2 seconds. Neurological:      Mental Status: She is alert and oriented to person, place, and time. Motor: No weakness. Gait: Gait normal.   Psychiatric:         Mood and Affect: Mood normal.          MDM     VITAL SIGNS:  Patient Vitals for the past 4 hrs:   Temp Pulse Resp BP SpO2   06/30/22 1330 -- 68 16 111/76 100 %   06/30/22 1230 -- 71 -- 109/74 98 %   06/30/22 1201 -- -- 16 -- --   06/30/22 1147 98.1 °F (36.7 °C) 93 -- 121/86 100 %         LABS:  No results found for this or any previous visit (from the past 6 hour(s)).      IMAGING:  No orders to display         Medications During Visit:  Medications   dexamethasone (PF) (DECADRON) 10 mg/mL injection 12 mg (12 mg IntraVENous Given 6/30/22 1218)   metoclopramide HCl (REGLAN) injection 10 mg (10 mg IntraVENous Given 6/30/22 1215)   diphenhydrAMINE (BENADRYL) injection 50 mg (50 mg IntraVENous Given 6/30/22 1215)   sodium chloride 0.9 % bolus infusion 1,000 mL (0 mL IntraVENous IV Completed 6/30/22 1335) DECISION MAKING:  Link Dunaway is a 28 y.o. female who comes in as above. Well-appearing patient. No neurological abnormalities on exam.  Resolution of symptoms with medicine here. Given her history of migraines and description of headache, most likely migraine headache. We will have patient follow-up with one of the provided PCPs      IMPRESSION:  1. Other migraine without status migrainosus, not intractable    2. Nonintractable episodic headache, unspecified headache type        DISPOSITION:  Discharged      Discharge Medication List as of 6/30/2022  1:39 PM           Follow-up Information     Follow up With Specialties Details Why Contact Info    Your PCP  Schedule an appointment as soon as possible for a visit               The patient is asked to follow-up with their primary care provider in the next several days. They are to call tomorrow for an appointment. The patient is asked to return promptly for any increased concerns or worsening of symptoms. They can return to this emergency department or any other emergency department.     Procedures

## 2022-07-27 ENCOUNTER — HOSPITAL ENCOUNTER (EMERGENCY)
Age: 32
Discharge: LEFT BEFORE DISCHARGE | End: 2022-07-27
Attending: EMERGENCY MEDICINE
Payer: MEDICAID

## 2022-07-27 VITALS
RESPIRATION RATE: 16 BRPM | SYSTOLIC BLOOD PRESSURE: 128 MMHG | TEMPERATURE: 98.3 F | OXYGEN SATURATION: 99 % | HEART RATE: 83 BPM | BODY MASS INDEX: 23.66 KG/M2 | WEIGHT: 142 LBS | HEIGHT: 65 IN | DIASTOLIC BLOOD PRESSURE: 73 MMHG

## 2022-07-27 DIAGNOSIS — Z53.21 ELOPED FROM EMERGENCY DEPARTMENT: Primary | ICD-10-CM

## 2022-07-27 PROCEDURE — 75810000275 HC EMERGENCY DEPT VISIT NO LEVEL OF CARE

## 2022-07-28 NOTE — PROGRESS NOTES
The patient eloped from the emergency department. She had been roomed and assessed by her RN. I did not make face-to-face contact with this patient.

## 2022-07-28 NOTE — ED NOTES
Pt reports \"boil\" to buttocks x 1 week. No obvious abscess noted, skin is not red or inflamed. Pt reports she feels a knot under the skin and it is very tender. No drainage noted. Emergency Department Nursing Plan of Care       The Nursing Plan of Care is developed from the Nursing assessment and Emergency Department Attending provider initial evaluation. The plan of care may be reviewed in the ED Provider note.     The Plan of Care was developed with the following considerations:   Patient / Family readiness to learn indicated by:verbalized understanding  Persons(s) to be included in education: patient  Barriers to Learning/Limitations:No    Signed     Eldon Monaco RN    7/27/2022   10:26 PM

## 2022-08-21 ENCOUNTER — HOSPITAL ENCOUNTER (EMERGENCY)
Age: 32
Discharge: HOME | End: 2022-08-21
Attending: EMERGENCY MEDICINE
Payer: MEDICAID

## 2022-08-21 VITALS
HEART RATE: 81 BPM | OXYGEN SATURATION: 97 % | DIASTOLIC BLOOD PRESSURE: 63 MMHG | BODY MASS INDEX: 24.99 KG/M2 | WEIGHT: 150 LBS | HEIGHT: 65 IN | RESPIRATION RATE: 16 BRPM | TEMPERATURE: 98.5 F | SYSTOLIC BLOOD PRESSURE: 108 MMHG

## 2022-08-21 DIAGNOSIS — K08.89 DENTALGIA: ICD-10-CM

## 2022-08-21 DIAGNOSIS — K02.9 DENTAL CARIES: Primary | ICD-10-CM

## 2022-08-21 PROCEDURE — 99283 EMERGENCY DEPT VISIT LOW MDM: CPT

## 2022-08-21 PROCEDURE — 74011250637 HC RX REV CODE- 250/637: Performed by: PHYSICIAN ASSISTANT

## 2022-08-21 PROCEDURE — 74011000250 HC RX REV CODE- 250: Performed by: PHYSICIAN ASSISTANT

## 2022-08-21 RX ORDER — CHOLECALCIFEROL (VITAMIN D3) 125 MCG
440 CAPSULE ORAL
Qty: 30 CAPSULE | Refills: 0 | Status: SHIPPED | OUTPATIENT
Start: 2022-08-21 | End: 2022-08-29 | Stop reason: ALTCHOICE

## 2022-08-21 RX ORDER — PENICILLIN V POTASSIUM 500 MG/1
500 TABLET, FILM COATED ORAL 4 TIMES DAILY
Qty: 28 TABLET | Refills: 0 | Status: SHIPPED | OUTPATIENT
Start: 2022-08-21 | End: 2022-08-28

## 2022-08-21 RX ADMIN — DIPHENHYDRAMINE HYDROCHLORIDE: 12.5 LIQUID ORAL at 13:03

## 2022-08-21 NOTE — DISCHARGE INSTRUCTIONS
You can also follow up with [facility] [address] [place] [address],[address]  [address] [place] [address] [address]  Jose Alfredo [address]

## 2022-08-21 NOTE — ED PROVIDER NOTES
EMERGENCY DEPARTMENT HISTORY AND PHYSICAL EXAM          Date: 8/21/2022  Patient Name: Irena Wilcox    History of Presenting Illness     Chief Complaint   Patient presents with    Dental Pain         History Provided By: Patient      HPI: Irena Wilcox is a 28 y.o. female with a PMH of ADHD, asthma, substance induced mood disorder who presents with left lower dental pain x4 days. Patient states she has taken Advil with no relief. Patient is a smoker. She rates discomfort 6 out of 10. She does not have a dentist at this time. She denies any fevers, chills, nausea or vomiting. PCP: None    Current Outpatient Medications   Medication Sig Dispense Refill    naproxen sodium 220 mg cap Take 2 Capsules by mouth every twelve (12) hours as needed for Pain. 30 Capsule 0    penicillin v potassium (VEETID) 500 mg tablet Take 1 Tablet by mouth four (4) times daily for 7 days. 28 Tablet 0       Past History     Past Medical History:  Past Medical History:   Diagnosis Date    ADHD (attention deficit hyperactivity disorder)     Asthma     Headache     Substance induced mood disorder ([facility]        Past Surgical History:  History reviewed. No pertinent surgical history.     Family History:  Family History   Problem Relation Age of Onset    Asthma Mother     Hypertension Mother     Asthma Sister     Hypertension Sister     Attention Deficit Hyperactivity Disorder Sister     Migraines Sister     Asthma Brother     Hypertension Brother     Depression Brother     Clotting Disorder Brother     Migraines Brother        Social History:  Social History     Tobacco Use    Smoking status: Every Day     Packs/day: 1.00     Types: Cigarettes    Smokeless tobacco: Never    Tobacco comments:     pt states she vapes   Substance Use Topics    Alcohol use: Never    Drug use: Not Currently     Types: Marijuana, Other, Heroin     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 Gastrointestinal:  Negative for nausea and vomiting. Allergic/Immunologic: Negative for immunocompromised state. Neurological:  Negative for dizziness, speech difficulty and weakness. All other systems reviewed and are negative. Physical Exam     Vitals:    08/21/22 1244   BP: 108/63   Pulse: 81   Resp: 16   Temp: 98.5 °F (36.9 °C)   SpO2: 97%   Weight: 68 kg (150 lb)   Height: 5' 5\" (1.651 m)     Physical Exam  Vitals and nursing note reviewed. Constitutional:       General: She is not in acute distress. Appearance: She is well-developed. HENT:      Head: Normocephalic and atraumatic. Mouth/Throat:      Dentition: Abnormal dentition. Dental caries (Partially fractured tooth #17) present. No dental abscesses. Eyes:      Conjunctiva/sclera: Conjunctivae normal.   Cardiovascular:      Rate and Rhythm: Normal rate and regular rhythm. Heart sounds: Normal heart sounds. Pulmonary:      Effort: Pulmonary effort is normal. No respiratory distress. Breath sounds: Normal breath sounds. No wheezing or rales. Skin:     General: Skin is warm and dry. Neurological:      Mental Status: She is alert and oriented to person, place, and time. Psychiatric:         Behavior: Behavior normal.         Thought Content: Thought content normal.         Judgment: Judgment normal.         Diagnostic Study Results     Labs -   No results found for this or any previous visit (from the past 12 hour(s)). Radiologic Studies -   No orders to display     CT Results  (Last 48 hours)      None          CXR Results  (Last 48 hours)      None              Medical Decision Making   I am the first provider for this patient. I reviewed the vital signs, available nursing notes, past medical history, past surgical history, family history and social history. Vital Signs-Reviewed the patient's vital signs.     Records Reviewed: Nursing Notes and Old Medical Records    Provider Notes (Medical Decision Making):   Patient presents with dental pain. No obvious abscess that needs drainage. No red flags that make PTA, RPA, ludwigs angina concerning. Will tx with dental ball, antibiotics and outpatient analgesics. Given information on dentists and importance of followup and no smoking. Disposition:  Discharged    DISCHARGE NOTE:   12:59 PM      Care plan outlined and precautions discussed. Patient has no new complaints, changes, or physical findings. All medications were reviewed with the patient; will d/c home. All of pt's questions and concerns were addressed. Patient was instructed and agrees to follow up with PCP and/or dentist, as well as to return to the ED upon further deterioration. Patient is ready to go home. Follow-up Information       Follow up With Specialties Details Why Contact Info    [facility]    [address] [place]  [phone]            Discharge Medication List as of 8/21/2022  1:01 PM        START taking these medications    Details   naproxen sodium 220 mg cap Take 2 Capsules by mouth every twelve (12) hours as needed for Pain., Normal, Disp-30 Capsule, R-0      penicillin v potassium (VEETID) 500 mg tablet Take 1 Tablet by mouth four (4) times daily for 7 days. , Normal, Disp-28 Tablet, R-0             Procedures:  Procedures    Please note that this dictation was completed with Dragon, computer voice recognition software. Quite often unanticipated grammatical, syntax, homophones, and other interpretive errors are inadvertently transcribed by the computer software. Please disregard these errors. Additionally, please excuse any errors that have escaped final proofreading. Diagnosis     Clinical Impression:   1. Dental caries    2.  Lawson Avery

## 2022-08-29 ENCOUNTER — HOSPITAL ENCOUNTER (EMERGENCY)
Age: 32
Discharge: HOME | End: 2022-08-29
Attending: EMERGENCY MEDICINE
Payer: MEDICAID

## 2022-08-29 VITALS
WEIGHT: 150 LBS | RESPIRATION RATE: 18 BRPM | DIASTOLIC BLOOD PRESSURE: 71 MMHG | HEART RATE: 62 BPM | BODY MASS INDEX: 24.99 KG/M2 | OXYGEN SATURATION: 100 % | HEIGHT: 65 IN | SYSTOLIC BLOOD PRESSURE: 112 MMHG | TEMPERATURE: 98.4 F

## 2022-08-29 DIAGNOSIS — S02.5XXA CLOSED FRACTURE OF TOOTH, INITIAL ENCOUNTER: Primary | ICD-10-CM

## 2022-08-29 DIAGNOSIS — T16.1XXA FOREIGN BODY OF RIGHT EAR, INITIAL ENCOUNTER: ICD-10-CM

## 2022-08-29 PROCEDURE — 74011250637 HC RX REV CODE- 250/637: Performed by: NURSE PRACTITIONER

## 2022-08-29 PROCEDURE — 75810000145 HC RMVL FB EAR W/O GEN ANES

## 2022-08-29 PROCEDURE — 99283 EMERGENCY DEPT VISIT LOW MDM: CPT

## 2022-08-29 RX ORDER — HYDROCODONE BITARTRATE AND ACETAMINOPHEN 5; 325 MG/1; MG/1
1 TABLET ORAL
Status: COMPLETED | OUTPATIENT
Start: 2022-08-29 | End: 2022-08-29

## 2022-08-29 RX ORDER — HYDROCODONE BITARTRATE AND ACETAMINOPHEN 5; 325 MG/1; MG/1
1 TABLET ORAL
Qty: 6 TABLET | Refills: 0 | Status: SHIPPED | OUTPATIENT
Start: 2022-08-29 | End: 2022-09-01

## 2022-08-29 RX ORDER — PENICILLIN V POTASSIUM 500 MG/1
500 TABLET, FILM COATED ORAL 2 TIMES DAILY
Qty: 14 TABLET | Refills: 0 | Status: SHIPPED | OUTPATIENT
Start: 2022-08-29 | End: 2022-09-13 | Stop reason: ALTCHOICE

## 2022-08-29 RX ORDER — IBUPROFEN 800 MG/1
800 TABLET ORAL
Qty: 20 TABLET | Refills: 0 | Status: SHIPPED | OUTPATIENT
Start: 2022-08-29 | End: 2022-09-05

## 2022-08-29 RX ADMIN — HYDROCODONE BITARTRATE AND ACETAMINOPHEN 1 TABLET: 5; 325 TABLET ORAL at 15:47

## 2022-08-29 NOTE — ED NOTES
Discharge instructions were given to the patient by John Paul Rene RN. The patient left the Emergency Department ambulatory, alert and oriented and in no acute distress with 3 prescriptions. The patient was encouraged to call or return to the ED for worsening issues or problems and was encouraged to schedule a follow up appointment for continuing care. The patient verbalized understanding of discharge instructions and prescriptions, all questions were answered. The patient has no further concerns at this time.

## 2022-08-29 NOTE — ED NOTES
Patient has been instructed that they have been given hydrocodone* which contains opioids, benzodiazepines, or other sedating drugs. Patient is aware that they  will need to refrain from driving or operating heavy machinery after taking this medication. Patient also instructed that they need to avoid drinking alcohol and using other products containing opioids, benzodiazepines, or other sedating drugs. Patient verbalized understanding.

## 2022-08-29 NOTE — ED NOTES
Pt presents to ED ambulatory complaining of \"all over\" dental pain. Pt is alert and oriented x 4, RR even and unlabored, skin is warm and dry. Assessment completed and pt updated on plan of care. Call bell in reach. Emergency Department Nursing Plan of Care       The Nursing Plan of Care is developed from the Nursing assessment and Emergency Department Attending provider initial evaluation. The plan of care may be reviewed in the ED Provider note.     The Plan of Care was developed with the following considerations:   Patient / Family readiness to learn indicated by:verbalized understanding  Persons(s) to be included in education: patient  Barriers to Learning/Limitations:No    Signed     Hartington Cheyenne    8/29/2022   3:54 PM

## 2022-08-29 NOTE — DISCHARGE INSTRUCTIONS
It was a pleasure taking care of you at [facility] Emergency Department today. We know that when you come to [facility], you are entrusting us with your health, comfort, and safety. Our physicians and nurses honor that trust, and we truly appreciate the opportunity to care for you and your loved ones. We also value our feedback. If you receive a survey about your Emergency Department experience today, please fill it out. We care about our patients' feedback, and we listen to what you have to say. Thank you!

## 2022-08-30 NOTE — ED PROVIDER NOTES
EMERGENCY DEPARTMENT HISTORY AND PHYSICAL EXAM          Date: 8/29/2022  Patient Name: Fan Marie    History of Presenting Illness     Chief Complaint   Patient presents with    Dental Pain     R upper dental pain x 2 days         History Provided By: Patient        HPI: Fan Marie is a 28 y.o. female with a PMH of  ADHD, Asthma, Headache, Substance Abuse   who presents with dental pain. Onset days ago. Located to the lower and upper gum but worst to the left lower gum. Denies fever, chills, facial swelling. She reports chronic pain to the dental region due to abnormal dentition. She does not have a dentist. In addition she reports applying tissue in the R ear due to the pain and she is unable to remove. She has not taken anything for the sx. PCP: None    Current Outpatient Medications   Medication Sig Dispense Refill    penicillin v potassium (VEETID) 500 mg tablet Take 1 Tablet by mouth two (2) times a day. 14 Tablet 0    HYDROcodone-acetaminophen (Norco) 5-325 mg per tablet Take 1 Tablet by mouth every six (6) hours as needed for Pain for up to 3 days. Max Daily Amount: 4 Tablets. 6 Tablet 0    ibuprofen (MOTRIN) 800 mg tablet Take 1 Tablet by mouth every six (6) hours as needed for Pain for up to 7 days. 20 Tablet 0       Past History     Past Medical History:  Past Medical History:   Diagnosis Date    ADHD (attention deficit hyperactivity disorder)     Asthma     Headache     Substance induced mood disorder ([facility] [address]        Past Surgical History:  No past surgical history on file.     Family History:  Family History   Problem Relation Age of Onset    Asthma Mother     Hypertension Mother     Asthma Sister     Hypertension Sister     Attention Deficit Hyperactivity Disorder Sister     Migraines Sister     Asthma Brother     Hypertension Brother     Depression Brother     Clotting Disorder Brother     Migraines Brother        Social History:  Social History     Tobacco Use    Smoking status: Every Day     Packs/day: 1.00     Types: Cigarettes    Smokeless tobacco: Never    Tobacco comments:     pt states she vapes   Substance Use Topics    Alcohol use: Never    Drug use: Not Currently     Types: Marijuana, Other, Heroin     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 HENT:  Positive for dental problem and ear pain. Negative for congestion, drooling, ear discharge, mouth sores, rhinorrhea and sore throat. Respiratory:  Negative for cough. Cardiovascular:  Negative for chest pain. Gastrointestinal:  Negative for abdominal pain, nausea and vomiting. Musculoskeletal:  Negative for arthralgias. Skin:  Negative for color change and rash. Neurological:  Negative for headaches. All other systems reviewed and are negative. Physical Exam     Vitals:    08/29/22 1427   BP: 112/71   Pulse: 62   Resp: 18   Temp: 98.4 °F (36.9 °C)   SpO2: 100%   Weight: 68 kg (150 lb)   Height: 5' 5\" (1.651 m)     Physical Exam  Vitals and nursing note reviewed. Constitutional:       General: She is not in acute distress. Appearance: She is well-developed. She is not ill-appearing. HENT:      Head: Normocephalic and atraumatic. Right Ear: Tympanic membrane, ear canal and external ear normal.      Left Ear: Tympanic membrane, ear canal and external ear normal.      Nose: Nose normal.      Mouth/Throat:      Mouth: Mucous membranes are moist.      Dentition: Abnormal dentition. Gingival swelling and dental caries present. No dental abscesses. Pharynx: Oropharynx is clear. No oropharyngeal exudate or posterior oropharyngeal erythema. Comments: Mltiple absent teeth  Eyes:      Extraocular Movements: Extraocular movements intact. Conjunctiva/sclera: Conjunctivae normal.      Pupils: Pupils are equal, round, and reactive to light.    Cardiovascular:      Rate and Rhythm: Normal rate and regular rhythm. Pulses: Normal pulses. Heart sounds: Normal heart sounds. Pulmonary:      Effort: Pulmonary effort is normal.      Breath sounds: Normal breath sounds. Musculoskeletal:      Cervical back: Normal range of motion and neck supple. Neurological:      Mental Status: She is alert and oriented to person, place, and time. Psychiatric:         Thought Content: Thought content normal.         Diagnostic Study Results     Labs -   No results found for this or any previous visit (from the past 12 hour(s)). Radiologic Studies -   No orders to display     CT Results  (Last 48 hours)      None          CXR Results  (Last 48 hours)      None              Medical Decision Making   I am the first provider for this patient. I reviewed the vital signs, available nursing notes, past medical history, past surgical history, family history and social history. Vital Signs-Reviewed the patient's vital signs. Records Reviewed: Nursing Notes and Old Medical Records    Provider Notes (Medical Decision Making):             Disposition:  Discharge     DISCHARGE NOTE:       Care plan outlined and precautions discussed. Patient has no new complaints, changes, or physical findings. All of pt's questions and concerns were addressed. Patient was instructed and agrees to follow up with PCP, as well as to return to the ED upon further deterioration. Patient is ready to go home. Follow-up Information       Follow up With Specialties Details Why Contact Info    [facility]  Schedule an appointment as soon as possible for a visit   [address] [place]  [phone]            Discharge Medication List as of 8/29/2022  3:53 PM        START taking these medications    Details   HYDROcodone-acetaminophen (Norco) 5-325 mg per tablet Take 1 Tablet by mouth every six (6) hours as needed for Pain for up to 3 days.  Max Daily Amount: 4 Tablets., Normal, Disp-6 Tablet, R-0      ibuprofen (MOTRIN) 800 mg tablet Take 1 Tablet by mouth every six (6) hours as needed for Pain for up to 7 days. , Normal, Disp-20 Tablet, R-0           CONTINUE these medications which have CHANGED    Details   penicillin v potassium (VEETID) 500 mg tablet Take 1 Tablet by mouth two (2) times a day., Normal, Disp-14 Tablet, R-0           STOP taking these medications       naproxen sodium 220 mg cap Comments:   Reason for Stopping:               Procedures:  Foreign Body Removal    Date/Time: 8/29/2022 10:47 PM  Performed by: Jules Brooks NP  Authorized by: Jules Brooks NP     Consent:     Consent obtained:  Verbal    Consent given by:  Patient    Risks, benefits, and alternatives were discussed: yes      Risks discussed:  Pain  Location:     Location:  Ear    Ear location:  R ear  Anesthesia:     Anesthesia method:  None  Procedure details:     Removal mechanism: Forceps    Foreign bodies recovered:  1    Description:  White tissue    Intact foreign body removal: yes    Post-procedure details:     Neurovascular status: intact      Confirmation:  No additional foreign bodies on visualization    Procedure completion:  Tolerated with difficulty    Please note that this dictation was completed with Dragon, computer voice recognition software. Quite often unanticipated grammatical, syntax, homophones, and other interpretive errors are inadvertently transcribed by the computer software. Please disregard these errors. Additionally, please excuse any errors that have escaped final proofreading. Diagnosis     Clinical Impression:   1. Closed fracture of tooth, initial encounter    2.  Foreign body of right ear, initial encounter

## 2022-09-13 ENCOUNTER — HOSPITAL ENCOUNTER (EMERGENCY)
Age: 32
Discharge: HOME | End: 2022-09-13
Attending: EMERGENCY MEDICINE
Payer: MEDICAID

## 2022-09-13 ENCOUNTER — HOSPITAL ENCOUNTER (EMERGENCY)
Age: 32
Discharge: LEFT BEFORE DISCHARGE | End: 2022-09-13
Attending: EMERGENCY MEDICINE | Admitting: EMERGENCY MEDICINE
Payer: MEDICAID

## 2022-09-13 VITALS
OXYGEN SATURATION: 96 % | WEIGHT: 153 LBS | SYSTOLIC BLOOD PRESSURE: 150 MMHG | HEIGHT: 65 IN | TEMPERATURE: 98.1 F | RESPIRATION RATE: 16 BRPM | BODY MASS INDEX: 25.49 KG/M2 | HEART RATE: 72 BPM | DIASTOLIC BLOOD PRESSURE: 112 MMHG

## 2022-09-13 VITALS
OXYGEN SATURATION: 98 % | TEMPERATURE: 98.1 F | SYSTOLIC BLOOD PRESSURE: 124 MMHG | RESPIRATION RATE: 22 BRPM | WEIGHT: 153 LBS | DIASTOLIC BLOOD PRESSURE: 84 MMHG | HEART RATE: 73 BPM | BODY MASS INDEX: 25.46 KG/M2

## 2022-09-13 DIAGNOSIS — K08.89 TOOTHACHE: Primary | ICD-10-CM

## 2022-09-13 DIAGNOSIS — S02.5XXA CLOSED FRACTURE OF TOOTH, INITIAL ENCOUNTER: Primary | ICD-10-CM

## 2022-09-13 PROCEDURE — 74011000250 HC RX REV CODE- 250: Performed by: EMERGENCY MEDICINE

## 2022-09-13 PROCEDURE — 99282 EMERGENCY DEPT VISIT SF MDM: CPT

## 2022-09-13 PROCEDURE — 99283 EMERGENCY DEPT VISIT LOW MDM: CPT

## 2022-09-13 PROCEDURE — 74011250637 HC RX REV CODE- 250/637: Performed by: EMERGENCY MEDICINE

## 2022-09-13 RX ORDER — CHOLECALCIFEROL (VITAMIN D3) 125 MCG
220 CAPSULE ORAL 2 TIMES DAILY
Qty: 30 CAPSULE | Refills: 0 | Status: SHIPPED | OUTPATIENT
Start: 2022-09-13

## 2022-09-13 RX ORDER — AMOXICILLIN 500 MG/1
500 TABLET, FILM COATED ORAL 3 TIMES DAILY
Qty: 30 TABLET | Refills: 0 | Status: SHIPPED | OUTPATIENT
Start: 2022-09-13

## 2022-09-13 RX ADMIN — Medication: at 10:53

## 2022-09-13 NOTE — Clinical Note
[address] EMERGENCY DEPT  [address] [facility] [address] [phone]    Work/School Note    Date: 9/13/2022    To Whom It May concern:    Cyndy Barclay was seen and treated today in the emergency room by the following provider(s):  Nurse Practitioner: Christopher Kwok NP. Cyndy Barclay is excused from work/school on 9/13/2022 through 9/15/2022. She is medically clear to return to work/school on 9/16/2022.          Sincerely,          Alvina Chacko NP

## 2022-09-13 NOTE — DISCHARGE INSTRUCTIONS
You already have medication sent to the pharmacy from your previous visit prior to arriving to this ER. Please take the prescribed amoxicillin and naprosyn. Please follow up with [facility] dentistry or your symptoms will continue to occur     It was a pleasure taking care of you at [facility] Emergency Department today. We know that when you come to [facility], you are entrusting us with your health, comfort, and safety. Our physicians and nurses honor that trust, and we truly appreciate the opportunity to care for you and your loved ones. We also value our feedback. If you receive a survey about your Emergency Department experience today, please fill it out. We care about our patients' feedback, and we listen to what you have to say. Thank you!

## 2022-09-13 NOTE — ED PROVIDER NOTES
HPI   The patient is a 81-year-old black female with a history of recurrent dental issues has a large broken left lower molar and has been seen multiple ER visits for similar complaints. She also has a history of substance abuse in the past ADHD asthma and headaches. She states the pain is intensified and she comes to be reevaluated. Her mother states she has been trying to get into the [facility] dental school to have the tooth removed. Past Medical History:   Diagnosis Date    ADHD (attention deficit hyperactivity disorder)     Asthma     Headache     Substance induced mood disorder ([facility] [address]        No past surgical history on file. Family History:   Problem Relation Age of Onset    Asthma Mother     Hypertension Mother     Asthma Sister     Hypertension Sister     Attention Deficit Hyperactivity Disorder Sister     Migraines Sister     Asthma Brother     Hypertension Brother     Depression Brother     Clotting Disorder Brother     Migraines Brother        Social History     Socioeconomic History    Marital status: SINGLE     Spouse name: Not on file    Number of children: Not on file    Years of education: Not on file    Highest education level: Not on file   Occupational History    Not on file   Tobacco Use    Smoking status: Every Day     Packs/day: 1.00     Types: Cigarettes    Smokeless tobacco: Never    Tobacco comments:     pt states she vapes   Vaping Use    Vaping Use: Not on file   Substance and Sexual Activity    Alcohol use: Never    Drug use: Not Currently     Types: Marijuana, Other, Heroin     Comment: Pt states clean three months    Sexual activity: Not Currently     Partners: Female   Other Topics Concern    Not on file   Social History Narrative    ** Merged History Encounter **         ** Merged History Encounter **         Live with her girlfriend and sister. Was on disability but recently lost her disability. No legal problem reported. Started seeing psychiatrist- age 7 behavior problem. Reports recent psych hospitalization at [facility] for possible OD/drug abuse    No abuse reported. Social Determinants of Health     Financial Resource Strain: Not on file   Food Insecurity: Not on file   Transportation Needs: Not on file   Physical Activity: Not on file   Stress: Not on file   Social Connections: Not on file   Intimate Partner Violence: Not on file   Housing Stability: Not on file         ALLERGIES: Patient has no known allergies. Review of Systems   All other systems reviewed and are negative. Vitals:    09/13/22 1007 09/13/22 1015   BP: (!) 147/111    Pulse: 73    Resp: 22    Temp: 98.1 °F (36.7 °C)    SpO2: 99% 99%   Weight: 69.4 kg (153 lb)             Physical Exam  HENT:      Head: Normocephalic. Nose: Nose normal.      Mouth/Throat:      Mouth: Mucous membranes are moist.      Comments: There is a fracture through the left lower molar posteriorly with some mild erythema surrounding but no sign of periapical abscess or other deep infection  Skin:     General: Skin is warm. Capillary Refill: Capillary refill takes less than 2 seconds. Neurological:      General: No focal deficit present. Mental Status: She is alert. Psychiatric:         Mood and Affect: Mood normal.         Thought Content:  Thought content normal.         Judgment: Judgment normal.        MDM         Procedures          Assessment and plan     the patient has recurrent episodes of tooth ache with a fracture of her left posterior molar will discharge home with Naprosyn and amoxicillin and dental balls with follow-up by [facility] dental school and her PCP

## 2022-09-13 NOTE — ED TRIAGE NOTES
ED triage note: Ambulatory with a steady gait. Tearful. States left upper and lower tooth pain radiating to left ear that started 2 weeks ago and has progressively gotten worse.

## 2022-09-13 NOTE — ED NOTES
Emergency Department Nursing Plan of Care       The Nursing Plan of Care is developed from the Nursing assessment and Emergency Department Attending provider initial evaluation. The plan of care may be reviewed in the ED Provider note.     The Plan of Care was developed with the following considerations:   Patient / Family readiness to learn indicated by:verbalized understanding  Persons(s) to be included in education: patient  Barriers to Learning/Limitations:No    [address] [facility] [place] KATY Trammell    9/13/2022   2:55 PM

## 2022-09-13 NOTE — ED PROVIDER NOTES
EMERGENCY DEPARTMENT HISTORY AND PHYSICAL EXAM          Date: 9/13/2022  Patient Name: Terry Ovalles    History of Presenting Illness     Chief Complaint   Patient presents with    Dental Pain    Ear Pain         History Provided By: Patient      HPI: Terry Ovalles is a 28 y.o. female with a PMH of  ADHD, Asthma,   who presents with  d ental and ear pain. Pain is chronic > 1 month. Located to the L side of mouth causing pain to radiate to the L ear. Denies fever, chills, facial swelling. Pt states she has not followed up with a dentist yet but has been evaluated in the ER several times for dental pain. Her last visit was today 1 hr prior to arrival.    PCP: None    Current Outpatient Medications   Medication Sig Dispense Refill    amoxicillin 500 mg tab Take 500 mg by mouth three (3) times daily. 30 Tablet 0    naproxen sodium 220 mg cap Take 1 Capsule by mouth two (2) times a day. 30 Capsule 0       Past History     Past Medical History:  Past Medical History:   Diagnosis Date    ADHD (attention deficit hyperactivity disorder)     Asthma     Headache     Substance induced mood disorder ([facility] [address]        Past Surgical History:  No past surgical history on file.     Family History:  Family History   Problem Relation Age of Onset    Asthma Mother     Hypertension Mother     Asthma Sister     Hypertension Sister     Attention Deficit Hyperactivity Disorder Sister     Migraines Sister     Asthma Brother     Hypertension Brother     Depression Brother     Clotting Disorder Brother     Migraines Brother        Social History:  Social History     Tobacco Use    Smoking status: Every Day     Packs/day: 1.00     Types: Cigarettes    Smokeless tobacco: Never    Tobacco comments:     pt states she vapes   Substance Use Topics    Alcohol use: Never    Drug use: Not Currently     Types: Marijuana, Other, Heroin     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 HENT:  Positive for dental problem and ear pain. Negative for congestion, drooling, mouth sores and rhinorrhea. Respiratory:  Negative for cough. Cardiovascular:  Negative for chest pain. Gastrointestinal:  Negative for abdominal pain, nausea and vomiting. Musculoskeletal:  Negative for arthralgias. Skin:  Negative for color change and rash. Neurological:  Negative for headaches. All other systems reviewed and are negative. Physical Exam     Vitals:    09/13/22 1226   BP: (!) 150/112   Pulse: 72   Resp: 16   Temp: 98.1 °F (36.7 °C)   SpO2: 96%   Weight: 69.4 kg (153 lb)   Height: 5' 5\" (1.651 m)     Physical Exam  Vitals and nursing note reviewed. Constitutional:       General: She is not in acute distress. Appearance: She is well-developed. She is not ill-appearing. HENT:      Head: Normocephalic and atraumatic. Right Ear: Tympanic membrane, ear canal and external ear normal.      Left Ear: Tympanic membrane, ear canal and external ear normal.      Nose: Nose normal.      Mouth/Throat:      Mouth: Mucous membranes are moist.      Dentition: Abnormal dentition (#17 fractured, caries noted to upper gum , absent to the L lower gum). Dental caries present. Pharynx: Oropharynx is clear. No oropharyngeal exudate or posterior oropharyngeal erythema. Eyes:      Extraocular Movements: Extraocular movements intact. Conjunctiva/sclera: Conjunctivae normal.      Pupils: Pupils are equal, round, and reactive to light. Cardiovascular:      Rate and Rhythm: Normal rate and regular rhythm. Pulses: Normal pulses. Heart sounds: Normal heart sounds. Pulmonary:      Effort: Pulmonary effort is normal.      Breath sounds: Normal breath sounds. Musculoskeletal:      Cervical back: Normal range of motion and neck supple. Neurological:      Mental Status: She is alert and oriented to person, place, and time. Psychiatric:         Thought Content: Thought content normal.         Diagnostic Study Results     Labs -   No results found for this or any previous visit (from the past 12 hour(s)). Radiologic Studies -   No orders to display     CT Results  (Last 48 hours)      None          CXR Results  (Last 48 hours)      None              Medical Decision Making   I am the first provider for this patient. I reviewed the vital signs, available nursing notes, past medical history, past surgical history, family history and social history. Vital Signs-Reviewed the patient's vital signs. Records Reviewed: Nursing Notes and Old Medical Records    Provider Notes (Medical Decision Making):   29 yo F presents with dental pain that required evaluation several times. She has received pain medication and abx on many occasions but has not followed up with dentistry as recommended. Chart review shows that pt has prescriptions in the pharmacy from her previous visit prior to arrival. Advised to  those medications and I will provide a work note for pt which she verbalizes understanding. Disposition:  Discharge     DISCHARGE NOTE:       Care plan outlined and precautions discussed. Patient has no new complaints, changes, or physical findings. All of pt's questions and concerns were addressed. Patient was instructed and agrees to follow up with Dentistry, as well as to return to the ED upon further deterioration. Patient is ready to go home. Follow-up Information       Follow up With Specialties Details Why Contact Info    [facility]  Schedule an appointment as soon as possible for a visit   [address] [place]  [phone]            Current Discharge Medication List          Procedures:  Procedures    Please note that this dictation was completed with Dragon, computer voice recognition software.   Quite often unanticipated grammatical, syntax, homophones, and other interpretive errors are inadvertently transcribed by the computer software. Please disregard these errors. Additionally, please excuse any errors that have escaped final proofreading. Diagnosis     Clinical Impression:   1.  Closed fracture of tooth, initial encounter

## 2022-09-13 NOTE — ED NOTES
Pt called out complaining that staff \"isnt doing anything to help my ear, its infected and I need something for my ear. If you aren't going to help me I will go somewhere that can. \" RN responded she will ED MD know and pt became loud, got close to RNs face and bean grabbing her belongings. Ambulatory out of department without waiting for registration. MD made aware.

## 2023-06-08 ENCOUNTER — HOSPITAL ENCOUNTER (EMERGENCY)
Facility: HOSPITAL | Age: 33
Discharge: HOME | End: 2023-06-08
Payer: MEDICAID

## 2023-06-08 VITALS
TEMPERATURE: 97.9 F | OXYGEN SATURATION: 98 % | RESPIRATION RATE: 20 BRPM | HEART RATE: 85 BPM | HEIGHT: 65 IN | BODY MASS INDEX: 23.32 KG/M2 | WEIGHT: 140 LBS | SYSTOLIC BLOOD PRESSURE: 139 MMHG | DIASTOLIC BLOOD PRESSURE: 104 MMHG

## 2023-06-08 DIAGNOSIS — R03.0 ELEVATED BLOOD PRESSURE READING: ICD-10-CM

## 2023-06-08 DIAGNOSIS — K08.89 PAIN, DENTAL: Primary | ICD-10-CM

## 2023-06-08 PROCEDURE — 2500000003 HC RX 250 WO HCPCS: Performed by: PHYSICIAN ASSISTANT

## 2023-06-08 PROCEDURE — 6370000000 HC RX 637 (ALT 250 FOR IP): Performed by: PHYSICIAN ASSISTANT

## 2023-06-08 RX ORDER — PENICILLIN V POTASSIUM 250 MG/1
500 TABLET ORAL
Status: COMPLETED | OUTPATIENT
Start: 2023-06-08 | End: 2023-06-08

## 2023-06-08 RX ORDER — PENICILLIN V POTASSIUM 500 MG/1
500 TABLET ORAL 4 TIMES DAILY
Qty: 28 TABLET | Refills: 0 | Status: SHIPPED | OUTPATIENT
Start: 2023-06-08 | End: 2023-06-15

## 2023-06-08 RX ORDER — BUPIVACAINE HYDROCHLORIDE 5 MG/ML
1 INJECTION, SOLUTION EPIDURAL; INTRACAUDAL
Status: COMPLETED | OUTPATIENT
Start: 2023-06-08 | End: 2023-06-08

## 2023-06-08 RX ADMIN — BUPIVACAINE HYDROCHLORIDE 5 MG: 5 INJECTION, SOLUTION EPIDURAL; INTRACAUDAL; PERINEURAL at 12:39

## 2023-06-08 RX ADMIN — PENICILLIN V POTASSIUM 500 MG: 250 TABLET, FILM COATED ORAL at 12:40

## 2023-06-08 RX ADMIN — DIPHENHYDRAMINE HYDROCHLORIDE: 12.5 LIQUID ORAL at 12:53

## 2023-06-08 ASSESSMENT — PAIN - FUNCTIONAL ASSESSMENT: PAIN_FUNCTIONAL_ASSESSMENT: 0-10

## 2023-06-08 ASSESSMENT — PAIN DESCRIPTION - LOCATION: LOCATION: TEETH

## 2023-06-08 ASSESSMENT — PAIN DESCRIPTION - DESCRIPTORS: DESCRIPTORS: THROBBING

## 2023-06-08 ASSESSMENT — PAIN SCALES - GENERAL: PAINLEVEL_OUTOF10: 7

## 2023-06-08 NOTE — ED NOTES
Pt is alert and oriented x 4, RR even and unlabored, skin is warm and dry. Pt appears in NAD at this time. Assessment completed and pt updated on plan of care. Call bell in reach. The Nursing Plan of Care is developed from the Nursing assessment and Emergency Department Attending provider initial evaluation. The plan of care may be reviewed in the ED Provider note.     The Plan of Care was developed with the following considerations:  Patient / Family readiness to learn indicated by:verbalized understanding  Persons(s) to be included in education: patient  Barriers to Learning/Limitations:None    [address] RN    6/8/2023   12:19 PM       Marvin Osman RN  06/08/23 1327

## 2023-06-08 NOTE — ED PROVIDER NOTES
[facility] EMERGENCY DEPT  EMERGENCY DEPARTMENT ENCOUNTER       Pt Name: Alexandro Tang  MRN: [id number]  [place] 1990  Date of evaluation: 6/8/2023  Provider: KAMERON Akins   PCP: No primary care provider on file. Note Started: 1:13 PM 6/8/23     CHIEF COMPLAINT       Chief Complaint   Patient presents with    Dental Pain        HISTORY OF PRESENT ILLNESS: 1 or more elements      History From: Patient  None     Alexandro Tang is a 33 y.o. female who presents to the ED for evaluation of Mild to moderate, constant, right lower dental pain for the past several days. States the pain is a constant, throbbing pain, intermittently radiates to the face, ear and jaw. Pain is worse with chewing, although denies difficulty swallowing. Tolerating solids and liquids well. Minimal relief with OTC pain relievers. States she has had dental issues in the past.  Denies recent trauma. Denies fevers, eye pain, changes in vision, neck pain or stiffness, difficulty speaking, changes in voice, facial rashes, difficulty opening mouth. Denies any chest pain, shortness of breath, abdominal pain, nausea, vomiting, diarrhea. No additional exacerbating or alleviating factors. No other complaints at this time. Nursing Notes were all reviewed and agreed with or any disagreements were addressed in the HPI. REVIEW OF SYSTEMS      Review of Systems   All other systems reviewed and are negative. Positives and Pertinent negatives as per HPI.     PAST HISTORY     Past Medical History:  Past Medical History:   Diagnosis Date    ADHD (attention deficit hyperactivity disorder)     Asthma     Headache     Substance induced mood disorder ([facility] [address]        Past Surgical History:  Past Surgical History:   Procedure Laterality Date    APPENDECTOMY         Family History:  Family History   Problem Relation Age of Onset    ADHD Sister     Hypertension Sister     Asthma Sister     Hypertension Mother     Asthma Mother

## 2023-06-08 NOTE — ED NOTES
Discharge instructions were given to the patient by Cecilio Stewart RN. The patient left the Emergency Department  in NAD with 1 prescription(s). The patient verbalized understanding of instructions and follow up care.        Omkar Sinclair RN  06/08/23 1300

## 2023-06-08 NOTE — DISCHARGE INSTRUCTIONS
Thank You! It was a pleasure taking care of you in our Emergency Department today. We know that when you come to [facility], you are entrusting us with your health, comfort, and safety. Our clinicians honor that trust, and truly appreciate the opportunity to care for you and your loved ones. We also value your feedback. If you receive a survey about your Emergency Department experience today, please fill it out. We care about our patients' feedback, and we listen to what you have to say. Thank you. Jolynn Min PA-C        Dental resources:    Emergency [address], [facility] [address]   Monday, Wednesday, Friday: 8am-5pm   Tuesday and Thursday: 8am-6pm   Phone: [phone]   $70 for Emergency Care   $60 for first routine care, then pay by sliding scale based upon income     [facility] [address] [address]Demian Santos [facility]   Phone: [phone], select option (2) to confirm time for treatment     [facility]   [address] [facility]Demian Patel   Monday-Friday: 8am-4pm   Phone: [phone] of Dentistry Urgent [address] [facility], [facility], [address] [facility], [facility]/Demian Santos [address]   Phone: [phone] to confirm a time for emergency treatment   Pediatrics: [phone]   $75 per tooth, extractions only     [facility]   [address], [address] [address]   Phone [phone] or [phone]   Hours [id number] (extractions)   Simple tooth extraction: $60 per tooth, $55 per x-ray     [facility] (in [place])   [facility] only   Phone: [phone], leave message saying you need an appointment to register   Hours: Wed 6-9p       Non-Urgent Kelsey ESCALANTE [address] [facility] at [address] [place], [place] [address]   Dental Clinic: [phone]   Oral Surgery Clinic: [phone]

## 2024-01-28 ENCOUNTER — HOSPITAL ENCOUNTER (EMERGENCY)
Facility: HOSPITAL | Age: 34
Discharge: HOME | End: 2024-01-28
Payer: MEDICAID

## 2024-01-28 ENCOUNTER — APPOINTMENT (OUTPATIENT)
Facility: HOSPITAL | Age: 34
End: 2024-01-28
Payer: MEDICAID

## 2024-01-28 VITALS
SYSTOLIC BLOOD PRESSURE: 116 MMHG | HEART RATE: 77 BPM | WEIGHT: 156 LBS | HEIGHT: 65 IN | OXYGEN SATURATION: 99 % | TEMPERATURE: 98.1 F | BODY MASS INDEX: 25.99 KG/M2 | DIASTOLIC BLOOD PRESSURE: 67 MMHG | RESPIRATION RATE: 19 BRPM

## 2024-01-28 DIAGNOSIS — J02.9 ACUTE PHARYNGITIS, UNSPECIFIED ETIOLOGY: Primary | ICD-10-CM

## 2024-01-28 LAB
ANION GAP BLD CALC-SCNC: 9.8 MMOL/L (ref 10–20)
BASOPHILS # BLD: 0.1 K/UL (ref 0–0.1)
BASOPHILS NFR BLD: 1 % (ref 0–1)
CA-I BLD-MCNC: 1.22 MMOL/L (ref 1.12–1.32)
CHLORIDE BLD-SCNC: 102 MMOL/L (ref 98–107)
CO2 BLD-SCNC: 29.2 MMOL/L (ref 21–32)
CREAT BLD-MCNC: 0.78 MG/DL (ref 0.6–1.3)
DEPRECATED S PYO AG THROAT QL EIA: NEGATIVE
DIFFERENTIAL METHOD BLD: ABNORMAL
EOSINOPHIL # BLD: 0.7 K/UL (ref 0–0.4)
EOSINOPHIL NFR BLD: 6 % (ref 0–7)
ERYTHROCYTE [DISTWIDTH] IN BLOOD BY AUTOMATED COUNT: 13.5 % (ref 11.5–14.5)
GLUCOSE BLD-MCNC: 85 MG/DL (ref 65–100)
HCT VFR BLD AUTO: 34 % (ref 35–47)
HGB BLD-MCNC: 11.4 G/DL (ref 11.5–16)
IMM GRANULOCYTES # BLD AUTO: 0 K/UL (ref 0–0.04)
IMM GRANULOCYTES NFR BLD AUTO: 0 % (ref 0–0.5)
LYMPHOCYTES # BLD: 3 K/UL (ref 0.8–3.5)
LYMPHOCYTES NFR BLD: 28 % (ref 12–49)
MCH RBC QN AUTO: 28.9 PG (ref 26–34)
MCHC RBC AUTO-ENTMCNC: 33.5 G/DL (ref 30–36.5)
MCV RBC AUTO: 86.1 FL (ref 80–99)
MONOCYTES # BLD: 0.5 K/UL (ref 0–1)
MONOCYTES NFR BLD: 5 % (ref 5–13)
NEUTS SEG # BLD: 6.3 K/UL (ref 1.8–8)
NEUTS SEG NFR BLD: 60 % (ref 32–75)
NRBC # BLD: 0 K/UL (ref 0–0.01)
NRBC BLD-RTO: 0 PER 100 WBC
PLATELET # BLD AUTO: 415 K/UL (ref 150–400)
PMV BLD AUTO: 9.4 FL (ref 8.9–12.9)
POTASSIUM BLD-SCNC: 3.6 MMOL/L (ref 3.5–5.1)
RBC # BLD AUTO: 3.95 M/UL (ref 3.8–5.2)
SERVICE CMNT-IMP: ABNORMAL
SODIUM BLD-SCNC: 141 MMOL/L (ref 136–145)
WBC # BLD AUTO: 10.5 K/UL (ref 3.6–11)

## 2024-01-28 PROCEDURE — 6360000004 HC RX CONTRAST MEDICATION: Performed by: NURSE PRACTITIONER

## 2024-01-28 PROCEDURE — 6360000002 HC RX W HCPCS: Performed by: NURSE PRACTITIONER

## 2024-01-28 PROCEDURE — 99285 EMERGENCY DEPT VISIT HI MDM: CPT

## 2024-01-28 PROCEDURE — 85025 COMPLETE CBC W/AUTO DIFF WBC: CPT

## 2024-01-28 PROCEDURE — 36415 COLL VENOUS BLD VENIPUNCTURE: CPT

## 2024-01-28 PROCEDURE — 96374 THER/PROPH/DIAG INJ IV PUSH: CPT

## 2024-01-28 PROCEDURE — 80047 BASIC METABLC PNL IONIZED CA: CPT

## 2024-01-28 PROCEDURE — 87880 STREP A ASSAY W/OPTIC: CPT

## 2024-01-28 PROCEDURE — 87070 CULTURE OTHR SPECIMN AEROBIC: CPT

## 2024-01-28 PROCEDURE — 70491 CT SOFT TISSUE NECK W/DYE: CPT

## 2024-01-28 PROCEDURE — 6370000000 HC RX 637 (ALT 250 FOR IP): Performed by: NURSE PRACTITIONER

## 2024-01-28 RX ORDER — LIDOCAINE HYDROCHLORIDE 20 MG/ML
15 SOLUTION OROPHARYNGEAL
Status: COMPLETED | OUTPATIENT
Start: 2024-01-28 | End: 2024-01-28

## 2024-01-28 RX ORDER — DEXAMETHASONE SODIUM PHOSPHATE 4 MG/ML
4 INJECTION, SOLUTION INTRA-ARTICULAR; INTRALESIONAL; INTRAMUSCULAR; INTRAVENOUS; SOFT TISSUE ONCE
Status: COMPLETED | OUTPATIENT
Start: 2024-01-28 | End: 2024-01-28

## 2024-01-28 RX ORDER — METHYLPREDNISOLONE 4 MG/1
TABLET ORAL
Qty: 1 KIT | Refills: 0 | Status: SHIPPED | OUTPATIENT
Start: 2024-01-28

## 2024-01-28 RX ADMIN — DEXAMETHASONE SODIUM PHOSPHATE 4 MG: 4 INJECTION INTRA-ARTICULAR; INTRALESIONAL; INTRAMUSCULAR; INTRAVENOUS; SOFT TISSUE at 21:06

## 2024-01-28 RX ADMIN — IOPAMIDOL 100 ML: 755 INJECTION, SOLUTION INTRAVENOUS at 20:32

## 2024-01-28 RX ADMIN — LIDOCAINE HYDROCHLORIDE 15 ML: 20 SOLUTION ORAL at 21:06

## 2024-01-28 ASSESSMENT — LIFESTYLE VARIABLES
HOW MANY STANDARD DRINKS CONTAINING ALCOHOL DO YOU HAVE ON A TYPICAL DAY: PATIENT DOES NOT DRINK
HOW OFTEN DO YOU HAVE A DRINK CONTAINING ALCOHOL: NEVER

## 2024-01-28 ASSESSMENT — PAIN DESCRIPTION - LOCATION: LOCATION: THROAT

## 2024-01-28 ASSESSMENT — PAIN - FUNCTIONAL ASSESSMENT: PAIN_FUNCTIONAL_ASSESSMENT: 0-10

## 2024-01-28 ASSESSMENT — PAIN DESCRIPTION - ORIENTATION: ORIENTATION: RIGHT;INNER

## 2024-01-28 ASSESSMENT — PAIN DESCRIPTION - DESCRIPTORS: DESCRIPTORS: SORE;ACHING

## 2024-01-28 ASSESSMENT — PAIN SCALES - GENERAL: PAINLEVEL_OUTOF10: 6

## 2024-01-28 ASSESSMENT — PAIN DESCRIPTION - PAIN TYPE: TYPE: ACUTE PAIN

## 2024-01-28 NOTE — ED TRIAGE NOTES
Pt presents to the ED c/o throat pain, oral pain, and pain with swallowing x 4 days.    Pt reports she took an unknown antibiotic that her mother gave her, per pt. Pt also took 800mg PO Ibuprofen at 1200 today with no relief.     Pt tonsils are red and swollen, tenderness on the right side of neck. Pt reports hx of tonsillar abscess and reports the symptoms are similar to last time.

## 2024-01-29 NOTE — ED PROVIDER NOTES
[facility] EMERGENCY DEPT  EMERGENCY DEPARTMENT ENCOUNTER       Pt Name: Selin Burns  MRN: [id number]  Birthdate 1990  Date of evaluation: 1/28/2024  Provider: WADE Pablo NP   PCP: No primary care provider on file.  Note Started: 7:40 PM EST 1/28/24     CHIEF COMPLAINT       Chief Complaint   Patient presents with    Pharyngitis        HISTORY OF PRESENT ILLNESS: 1 or more elements      History From: Patient  HPI Limitations: None     Selin Burns is a 33 y.o. female who presents sore throat.  Onset 4 days ago.  Associated with painful swallowing.  Denies fever, chills, body aches.  Patient states she is able to control her secretions.  She reports feeling similar symptoms in the past and was diagnosed with a tonsillar abscess at that time.  She states she has taken ibuprofen with no relief.     Nursing Notes were all reviewed and agreed with or any disagreements were addressed in the HPI.     REVIEW OF SYSTEMS      Review of Systems     Positives and Pertinent negatives as per HPI.    PAST HISTORY     Past Medical History:  Past Medical History:   Diagnosis Date    ADHD (attention deficit hyperactivity disorder)     Asthma     Headache     Substance induced mood disorder (HCC)        Past Surgical History:  Past Surgical History:   Procedure Laterality Date    APPENDECTOMY         Family History:  Family History   Problem Relation Age of Onset    ADHD Sister     Hypertension Sister     Asthma Sister     Hypertension Mother     Asthma Mother     Migraines Sister     Clotting Disorder Brother     Depression Brother     Hypertension Brother     Asthma Brother     Migraines Brother        Social History:  Social History     Tobacco Use    Smoking status: Former     Current packs/day: 1.00     Types: Cigarettes    Smokeless tobacco: Never    Tobacco comments:     Quit smoking: pt states she vapes   Vaping Use    Vaping Use: Every day    Substances: Nicotine   Substance Use Topics    Alcohol use:

## 2024-01-29 NOTE — DISCHARGE INSTRUCTIONS
It was a pleasure taking care of you at [facility] Emergency Department today.  We know that when you come to [facility], you are entrusting us with your health, comfort, and safety.  Our physicians and nurses honor that trust, and we truly appreciate the opportunity to care for you and your loved ones.      We also value our feedback.  If you receive a survey about your Emergency Department experience today, please fill it out.  We care about our patients' feedback, and we listen to what you have to say.  Thank you!

## 2024-01-29 NOTE — ED NOTES
Patient (s)  given copy of dc instructions and 1 script(s).  Patient (s)  verbalized understanding of instructions and script (s).  Patient given a current medication reconciliation form and verbalized understanding of their medications.   Patient (s) verbalized understanding of the importance of discussing medications with  his or her physician or clinic they will be following up with.  Patient alert and oriented and in no acute distress.  Patient discharged home ambulatory with friend.

## 2024-01-30 LAB
BACTERIA SPEC CULT: NORMAL
SERVICE CMNT-IMP: NORMAL

## 2024-06-11 ENCOUNTER — NURSE TRIAGE (OUTPATIENT)
Dept: OTHER | Facility: CLINIC | Age: 34
End: 2024-06-11

## 2024-06-11 ENCOUNTER — TELEPHONE (OUTPATIENT)
Age: 34
End: 2024-06-11

## 2024-06-11 NOTE — TELEPHONE ENCOUNTER
Pt is scheduled for soonest appt. No sooner NP appt are available. Pt is on wait list for sooner appt

## 2024-06-11 NOTE — TELEPHONE ENCOUNTER
Location [place]: [place]    Received call from Jak at [facility]; Patient with Red Flag Complaint requesting to establish care with [facility].    Subjective: Caller states \" Migraines everyday. \"     Limited triage, mother calling for pt and is not with her.     Current Symptoms:   Can't work because of the headaches have been so severe.     Vomiting with them at times.     Mother states she just spoke with her and she does not currently have the HA but she has been getting them off an on everyday.    Onset: several years ago; waxing and waning    Associated Symptoms: NA    Pain Severity:  Mother states they are severe.    Temperature: Denies      What has been tried: OTC medications     LMP: NA Pregnant: Unknown    Recommended disposition: Go to [facility]  Advised that she be seen at [facility] if she cannot get into office in the next 4 hours.     Care advice provided, patient verbalizes understanding; denies any other questions or concerns; instructed to call back for any new or worsening symptoms.    Could not reach [facility] in the 2 minute time frame, advised that mother call back and tell them about the recommended time frame and that she already spoke with a nurse. She agreed to call back.     Attention Provider:  Thank you for allowing me to participate in the care of your patient.  The patient was connected to triage in response to information provided to the [facility].  Please do not respond through this encounter as the response is not directed to a shared pool.    Reason for Disposition   SEVERE headache (e.g., excruciating) and has had severe headaches before    Protocols used: Headache-ADULT-[place]

## 2024-06-11 NOTE — TELEPHONE ENCOUNTER
Mom was told that she might be able to get pt in sooner if she called.      Please call her to let her know.

## 2024-07-21 ENCOUNTER — HOSPITAL ENCOUNTER (EMERGENCY)
Facility: HOSPITAL | Age: 34
Discharge: HOME | End: 2024-07-22
Payer: MEDICAID

## 2024-07-21 VITALS
DIASTOLIC BLOOD PRESSURE: 71 MMHG | HEART RATE: 65 BPM | HEIGHT: 65 IN | TEMPERATURE: 97.7 F | RESPIRATION RATE: 20 BRPM | OXYGEN SATURATION: 98 % | BODY MASS INDEX: 23.32 KG/M2 | SYSTOLIC BLOOD PRESSURE: 114 MMHG | WEIGHT: 140 LBS

## 2024-07-21 DIAGNOSIS — T16.2XXD FOREIGN BODY OF LEFT EAR, SUBSEQUENT ENCOUNTER: Primary | ICD-10-CM

## 2024-07-21 PROCEDURE — 6370000000 HC RX 637 (ALT 250 FOR IP): Performed by: EMERGENCY MEDICINE

## 2024-07-21 PROCEDURE — 69200 CLEAR OUTER EAR CANAL: CPT

## 2024-07-21 PROCEDURE — 6370000000 HC RX 637 (ALT 250 FOR IP): Performed by: NURSE PRACTITIONER

## 2024-07-21 PROCEDURE — 99283 EMERGENCY DEPT VISIT LOW MDM: CPT

## 2024-07-21 RX ORDER — IBUPROFEN 400 MG/1
800 TABLET ORAL
Status: COMPLETED | OUTPATIENT
Start: 2024-07-21 | End: 2024-07-21

## 2024-07-21 RX ADMIN — Medication 3 ML: at 22:56

## 2024-07-21 RX ADMIN — IBUPROFEN 800 MG: 400 TABLET, FILM COATED ORAL at 22:22

## 2024-07-22 RX ORDER — CIPROFLOXACIN AND DEXAMETHASONE 3; 1 MG/ML; MG/ML
4 SUSPENSION/ DROPS AURICULAR (OTIC) 2 TIMES DAILY
Qty: 7.5 ML | Refills: 0 | Status: SHIPPED | OUTPATIENT
Start: 2024-07-22

## 2024-07-22 NOTE — ED NOTES
Pt discharged home. Home care and follow-up instructions given to pt. Pt verbalized understanding. No IV. VS WNL. No distress observed. 1 prescription sent to pt's Pharmacy. Pt ambulated to exit with family.

## 2024-07-22 NOTE — ED NOTES
Discharge instructions were given to the patient by obinna.     The patient left the Emergency Department ambulatory, alert and oriented and in no acute distress with 1 prescriptions. The patient was encouraged to call or return to the ED for worsening issues or problems and was encouraged to schedule a follow up appointment for continuing care.     The patient verbalized understanding of discharge instructions and prescriptions, all questions were answered. The patient has no further concerns at this time.

## 2024-07-22 NOTE — ED TRIAGE NOTES
Pt presents to ED with headache and foreign body in left ear. Pt reports she had a headache and put tissue in her ear to relieve the pain. Pt reports she is unable to get the tissue out. Tissue has been stuck for several days. Pt reports chrinc headaches and states she has headaches everyday.

## 2024-07-23 ASSESSMENT — ENCOUNTER SYMPTOMS
ABDOMINAL PAIN: 0
BACK PAIN: 0
SHORTNESS OF BREATH: 0

## 2024-07-24 NOTE — ED PROVIDER NOTES
[facility] EMERGENCY DEPT  EMERGENCY DEPARTMENT ENCOUNTER       Pt Name: Selin Burns  MRN: [id number]  Birthdate 1990  Date of evaluation: 7/21/2024  Provider: WADE Ureña NP   PCP: No primary care provider on file.  Note Started: 10:46 PM 7/23/24     CHIEF COMPLAINT       Chief Complaint   Patient presents with    Headache    Foreign Body in Ear     left        HISTORY OF PRESENT ILLNESS: 1 or more elements      History Provided by: Patient   History is limited by: Nothing     Selin Burns is a 33 y.o. female who presents cc headache . States she thinks headache is worse becausepain. States she put toilet tissue in her left ear because it helps when she has a headache. Patient's partner states she usually flushes it out but this time the toilet paper got stuck.  Nursing Notes were all reviewed and agreed with or any disagreements were addressed in the HPI.     REVIEW OF SYSTEMS      Review of Systems   Constitutional:  Negative for fever.   HENT:  Positive for ear pain. Negative for congestion.    Eyes:  Negative for visual disturbance.   Respiratory:  Negative for shortness of breath.    Cardiovascular:  Negative for chest pain.   Gastrointestinal:  Negative for abdominal pain.   Genitourinary:  Negative for difficulty urinating.   Musculoskeletal:  Negative for back pain and neck pain.   Skin:  Negative for rash.   Neurological:  Positive for headaches. Negative for dizziness and weakness.   Psychiatric/Behavioral:  Negative for behavioral problems.    All other systems reviewed and are negative.       Positives and Pertinent negatives as per HPI.    PAST HISTORY     Past Medical History:  Past Medical History:   Diagnosis Date    ADHD (attention deficit hyperactivity disorder)     Asthma     Headache     Substance induced mood disorder (HCC)        Past Surgical History:  Past Surgical History:   Procedure Laterality Date    APPENDECTOMY         Family History:  Family History   Problem

## 2024-08-20 ENCOUNTER — HOSPITAL ENCOUNTER (EMERGENCY)
Facility: HOSPITAL | Age: 34
Discharge: HOME | End: 2024-08-20
Payer: MEDICAID

## 2024-08-20 VITALS
SYSTOLIC BLOOD PRESSURE: 126 MMHG | HEART RATE: 68 BPM | TEMPERATURE: 97.8 F | HEIGHT: 66 IN | WEIGHT: 161 LBS | RESPIRATION RATE: 18 BRPM | BODY MASS INDEX: 25.88 KG/M2 | DIASTOLIC BLOOD PRESSURE: 76 MMHG | OXYGEN SATURATION: 99 %

## 2024-08-20 DIAGNOSIS — K08.89 PAIN, DENTAL: Primary | ICD-10-CM

## 2024-08-20 PROCEDURE — 6370000000 HC RX 637 (ALT 250 FOR IP)

## 2024-08-20 PROCEDURE — 99283 EMERGENCY DEPT VISIT LOW MDM: CPT

## 2024-08-20 RX ORDER — PENICILLIN V POTASSIUM 250 MG/1
500 TABLET ORAL
Status: COMPLETED | OUTPATIENT
Start: 2024-08-20 | End: 2024-08-20

## 2024-08-20 RX ORDER — NAPROXEN 500 MG/1
500 TABLET ORAL 2 TIMES DAILY PRN
Qty: 40 TABLET | Refills: 0 | Status: SHIPPED | OUTPATIENT
Start: 2024-08-20

## 2024-08-20 RX ORDER — NAPROXEN 250 MG/1
500 TABLET ORAL
Status: COMPLETED | OUTPATIENT
Start: 2024-08-20 | End: 2024-08-20

## 2024-08-20 RX ORDER — LIDOCAINE HYDROCHLORIDE 20 MG/ML
15 SOLUTION OROPHARYNGEAL
Status: COMPLETED | OUTPATIENT
Start: 2024-08-20 | End: 2024-08-20

## 2024-08-20 RX ORDER — PENICILLIN V POTASSIUM 500 MG/1
500 TABLET ORAL 4 TIMES DAILY
Qty: 40 TABLET | Refills: 0 | Status: SHIPPED | OUTPATIENT
Start: 2024-08-20 | End: 2024-08-30

## 2024-08-20 RX ADMIN — BENZOCAINE, BUTAMBEN, AND TETRACAINE HYDROCHLORIDE 3 SPRAY: .028; .004; .004 AEROSOL, SPRAY TOPICAL at 13:53

## 2024-08-20 RX ADMIN — DIPHENHYDRAMINE HCL ORAL 25 MG: 25 SOLUTION ORAL at 13:53

## 2024-08-20 RX ADMIN — LIDOCAINE HYDROCHLORIDE 15 ML: 20 SOLUTION ORAL at 13:53

## 2024-08-20 RX ADMIN — NAPROXEN 500 MG: 250 TABLET ORAL at 13:52

## 2024-08-20 RX ADMIN — PENICILLIN V POTASSIUM 500 MG: 250 TABLET, FILM COATED ORAL at 13:52

## 2024-08-20 ASSESSMENT — PAIN SCALES - GENERAL: PAINLEVEL_OUTOF10: 9

## 2024-08-20 ASSESSMENT — PAIN - FUNCTIONAL ASSESSMENT: PAIN_FUNCTIONAL_ASSESSMENT: 0-10

## 2024-08-20 ASSESSMENT — PAIN DESCRIPTION - LOCATION: LOCATION: TEETH

## 2024-08-20 NOTE — ED PROVIDER NOTES
[facility] EMERGENCY DEPT  EMERGENCY DEPARTMENT HISTORY AND PHYSICAL EXAM      Date: 8/20/2024  Patient Name: Selin Burns  MRN: [id number]  YOB: 1990  Date of evaluation: 8/20/2024  Provider: WADE Hood NP   Note Started: 4:25 PM EDT 8/20/24    HISTORY OF PRESENT ILLNESS     Chief Complaint   Patient presents with    Dental Pain     Pt reports dental pain for over a month, she has an appt but it is not for awhile       History Provided By: Patient    HPI: Selin Burns is a 33 y.o. female with past medical history as listed below, presents ambulatory to the emergency department with complaints of nontraumatic right upper and lower dental pain for 1 month.  She has an appointment with her dentist scheduled for next month, but could not make it this long.  Has been using Orajel but this is no longer working for her.  Denies fever/chills and is otherwise in her usual state of health. Upon arrival to the ED pt is alert and oriented x 3, well-appearing, and interacting appropriately; no obvious distress noted.      PAST MEDICAL HISTORY   Past Medical History:  Past Medical History:   Diagnosis Date    ADHD (attention deficit hyperactivity disorder)     Asthma     Headache     Substance induced mood disorder (HCC)        Past Surgical History:  Past Surgical History:   Procedure Laterality Date    APPENDECTOMY         Family History:  Family History   Problem Relation Age of Onset    ADHD Sister     Hypertension Sister     Asthma Sister     Hypertension Mother     Asthma Mother     Migraines Sister     Clotting Disorder Brother     Depression Brother     Hypertension Brother     Asthma Brother     Migraines Brother        Social History:  Social History     Tobacco Use    Smoking status: Former     Current packs/day: 1.00     Types: Cigarettes    Smokeless tobacco: Current    Tobacco comments:     Quit smoking: pt states she vapes   Vaping Use    Vaping status: Every Day    Substances: Nicotine

## 2024-11-04 ENCOUNTER — HOSPITAL ENCOUNTER (EMERGENCY)
Facility: HOSPITAL | Age: 34
Discharge: HOME | End: 2024-11-04
Attending: EMERGENCY MEDICINE

## 2024-11-04 VITALS
RESPIRATION RATE: 18 BRPM | TEMPERATURE: 98.5 F | HEART RATE: 60 BPM | DIASTOLIC BLOOD PRESSURE: 80 MMHG | SYSTOLIC BLOOD PRESSURE: 125 MMHG | WEIGHT: 160 LBS | OXYGEN SATURATION: 100 % | HEIGHT: 65 IN | BODY MASS INDEX: 26.66 KG/M2

## 2024-11-04 DIAGNOSIS — K04.7 ABSCESSED TOOTH: Primary | ICD-10-CM

## 2024-11-04 PROCEDURE — 6370000000 HC RX 637 (ALT 250 FOR IP): Performed by: EMERGENCY MEDICINE

## 2024-11-04 PROCEDURE — 99283 EMERGENCY DEPT VISIT LOW MDM: CPT

## 2024-11-04 RX ORDER — IBUPROFEN 400 MG/1
800 TABLET, FILM COATED ORAL
Status: COMPLETED | OUTPATIENT
Start: 2024-11-04 | End: 2024-11-04

## 2024-11-04 RX ORDER — PENICILLIN V POTASSIUM 250 MG/1
500 TABLET, FILM COATED ORAL
Status: COMPLETED | OUTPATIENT
Start: 2024-11-04 | End: 2024-11-04

## 2024-11-04 RX ORDER — PENICILLIN V POTASSIUM 500 MG/1
500 TABLET, FILM COATED ORAL 4 TIMES DAILY
Qty: 40 TABLET | Refills: 0 | Status: SHIPPED | OUTPATIENT
Start: 2024-11-04 | End: 2024-11-14

## 2024-11-04 RX ORDER — IBUPROFEN 800 MG/1
800 TABLET, FILM COATED ORAL EVERY 8 HOURS PRN
Qty: 30 TABLET | Refills: 1 | Status: SHIPPED | OUTPATIENT
Start: 2024-11-04 | End: 2024-11-24

## 2024-11-04 RX ORDER — OXYCODONE AND ACETAMINOPHEN 5; 325 MG/1; MG/1
1 TABLET ORAL
Status: COMPLETED | OUTPATIENT
Start: 2024-11-04 | End: 2024-11-04

## 2024-11-04 RX ADMIN — PENICILLIN V POTASSIUM 500 MG: 250 TABLET, FILM COATED ORAL at 08:12

## 2024-11-04 RX ADMIN — IBUPROFEN 800 MG: 400 TABLET, FILM COATED ORAL at 08:12

## 2024-11-04 RX ADMIN — DIPHENHYDRAMINE HCL ORAL: 25 SOLUTION ORAL at 08:14

## 2024-11-04 RX ADMIN — OXYCODONE HYDROCHLORIDE AND ACETAMINOPHEN 1 TABLET: 5; 325 TABLET ORAL at 08:12

## 2024-11-04 ASSESSMENT — PAIN DESCRIPTION - ORIENTATION: ORIENTATION: RIGHT;UPPER

## 2024-11-04 ASSESSMENT — PAIN - FUNCTIONAL ASSESSMENT: PAIN_FUNCTIONAL_ASSESSMENT: 0-10

## 2024-11-04 ASSESSMENT — PAIN SCALES - GENERAL: PAINLEVEL_OUTOF10: 10

## 2024-11-04 ASSESSMENT — PAIN DESCRIPTION - LOCATION: LOCATION: MOUTH

## 2024-11-04 NOTE — ED TRIAGE NOTES
Pt arrives to ED complaining of upper right tooth pain. She reports having a tooth pulled x2-3 weeks. She reports needing additional dental work.

## 2024-11-04 NOTE — ED PROVIDER NOTES
Disorder Brother     Depression Brother     Hypertension Brother     Asthma Brother     Migraines Brother        Social History:  Social History     Tobacco Use    Smoking status: Former     Current packs/day: 1.00     Types: Cigarettes    Smokeless tobacco: Current    Tobacco comments:     Quit smoking: pt states she vapes   Vaping Use    Vaping status: Every Day    Substances: Nicotine   Substance Use Topics    Alcohol use: Not Currently    Drug use: Not Currently     Types: Other, Heroin, Marijuana (Weed)       Allergies:  Allergies   Allergen Reactions    Tylenol With Codeine #3 [Acetaminophen-Codeine]      Itching       CURRENT MEDICATIONS      Discharge Medication List as of 11/4/2024  8:06 AM        CONTINUE these medications which have NOT CHANGED    Details   naproxen (NAPROSYN) 500 MG tablet Take 1 tablet by mouth 2 times daily as needed for Pain, Disp-40 tablet, R-0Normal      ciprofloxacin-dexAMETHasone (CIPRODEX) 0.3-0.1 % otic suspension Place 4 drops into the left ear 2 times daily, Disp-7.5 mL, R-0Normal      methylPREDNISolone (MEDROL, TIMMY,) 4 MG tablet Take by mouth as directed, Disp-1 kit, R-0Normal      Amoxicillin 500 MG TABS Take 500 mg by mouth 3 times dailyHistorical Med      Naproxen Sodium 220 MG CAPS Take 220 mg by mouth 2 times dailyHistorical Med             SCREENINGS               No data recorded         PHYSICAL EXAM      ED Triage Vitals [11/04/24 0758]   Encounter Vitals Group      /80      Systolic BP Percentile       Diastolic BP Percentile       Pulse 60      Respirations 18      Temp 98.5 °F (36.9 °C)      Temp Source Oral      SpO2 100 %      Weight - Scale 72.6 kg (160 lb)      Height 1.651 m (5' 5\")      Head Circumference       Peak Flow       Pain Score       Pain Loc       Pain Education       Exclude from Growth Chart        Physical Exam  Constitutional:       General: She is not in acute distress.     Appearance: She is not ill-appearing, toxic-appearing or

## 2024-11-04 NOTE — DISCHARGE INSTRUCTIONS
Emergency Dental Care     [facility]  [address]; [place], [place] [address]  Open M-F: 8AM - 5PM  Main Phone: [phone]  Pediatric Phone: [phone]  $70 for Emergency Care  $60 for first routine care, then pay by sliding scale based upon income.    [facility]  [address] [facility]; [place] [address]  Phone: [phone]    [facility]  [facility]: [address] [facility]; [place] [address]  [facility]: [address] [place] [address]; [place] [address]  Phone: [phone]  Open Monday - Friday 8AM-4:30 PM  Emergency Walk-In Care: Monday-Thursday 8 AM-12 PM    [facility]  [facility], [facility], [address] [place] [address], 1st Floor  First Come First Service starting at 8:30 AM M-F  Phone: [phone], press 2  Fee: $150 per tooth (x-ray & extractions only)  Pediatrics Phone: [phone], 8-5 M-F    [facility]  [facility], [facility], [address] [place] [address], 2nd Floor, [address]; [place], [place]  First Come First Service starting at 8:30 AM-3 PM M - F    [facility]  [address] [place] [address]  Phone: [phone] or [phone]  Emergency Hours: 9:30AM - 11AM (extractions)  Simple tooth extraction $ per tooth. $75 for x-ray    [facility]  [address] [place] [address]  [facility] Residents only, over the age of 18  Phone: [phone]. Leave message saying you need an appointment to register.  Hours: Tuesday Evenings

## 2024-11-04 NOTE — ED NOTES
Patient has been instructed that they have been given percocet which contains opioids, benzodiazepines, or other sedating drugs. Patient is aware that they will need to refrain from driving or operating heavy machinery after taking this medication. Patient also instructed that they need to avoid drinking alcohol and using other products containing opioids, benzodiazepines, or other sedating drugs. Patient verbalized understanding.

## 2024-11-04 NOTE — ED NOTES
Discharge instructions were given to the patient by Naya Paz RN  .     The patient left the Emergency Department ambulatory, alert and oriented and in no acute distress with 2 prescriptions. The patient was encouraged to call or return to the ED for worsening issues or problems and was encouraged to schedule a follow up appointment for continuing care. Patient discharged home ambulatory with a steady gait, work note and designated .    The patient verbalized understanding of discharge instructions and prescriptions, all questions were answered. The patient has no further concerns at this time.

## 2024-11-04 NOTE — ED NOTES
Pt presents ambulatory to ED complaining of dental pain on the right upper side of her mouth. She reports having a tooth pulled recently but needs additional dental work done. Pt reports attempting to get an appointment with the dentist but is needing relief from pain in the mean time. Pt denies taking medication for her pain. Pt is alert and oriented x 4, RR even and unlabored, skin is warm and dry. Assessment completed and pt updated on plan of care.        Emergency Department Nursing Plan of Care        The Nursing Plan of Care is developed from the Nursing assessment and Emergency Department Attending provider initial evaluation.  The plan of care may be reviewed in the “ED Provider note”.

## 2024-11-09 ENCOUNTER — HOSPITAL ENCOUNTER (EMERGENCY)
Facility: HOSPITAL | Age: 34
Discharge: HOME | End: 2024-11-09
Attending: STUDENT IN AN ORGANIZED HEALTH CARE EDUCATION/TRAINING PROGRAM
Payer: MEDICAID

## 2024-11-09 VITALS
TEMPERATURE: 98.4 F | WEIGHT: 160 LBS | BODY MASS INDEX: 26.66 KG/M2 | DIASTOLIC BLOOD PRESSURE: 82 MMHG | OXYGEN SATURATION: 100 % | RESPIRATION RATE: 18 BRPM | SYSTOLIC BLOOD PRESSURE: 125 MMHG | HEART RATE: 68 BPM | HEIGHT: 65 IN

## 2024-11-09 DIAGNOSIS — J03.90 ACUTE TONSILLITIS, UNSPECIFIED ETIOLOGY: ICD-10-CM

## 2024-11-09 DIAGNOSIS — J02.9 ACUTE PHARYNGITIS, UNSPECIFIED ETIOLOGY: Primary | ICD-10-CM

## 2024-11-09 LAB
FLUAV RNA SPEC QL NAA+PROBE: NOT DETECTED
FLUBV RNA SPEC QL NAA+PROBE: NOT DETECTED
HCG UR QL: NEGATIVE
S PYO DNA THROAT QL NAA+PROBE: NOT DETECTED
SARS-COV-2 RNA RESP QL NAA+PROBE: NOT DETECTED
SOURCE: NORMAL

## 2024-11-09 PROCEDURE — 81025 URINE PREGNANCY TEST: CPT

## 2024-11-09 PROCEDURE — 87636 SARSCOV2 & INF A&B AMP PRB: CPT

## 2024-11-09 PROCEDURE — 99284 EMERGENCY DEPT VISIT MOD MDM: CPT

## 2024-11-09 PROCEDURE — 96372 THER/PROPH/DIAG INJ SC/IM: CPT

## 2024-11-09 PROCEDURE — 87651 STREP A DNA AMP PROBE: CPT

## 2024-11-09 PROCEDURE — 6370000000 HC RX 637 (ALT 250 FOR IP): Performed by: STUDENT IN AN ORGANIZED HEALTH CARE EDUCATION/TRAINING PROGRAM

## 2024-11-09 PROCEDURE — 6360000002 HC RX W HCPCS: Performed by: STUDENT IN AN ORGANIZED HEALTH CARE EDUCATION/TRAINING PROGRAM

## 2024-11-09 RX ORDER — KETOROLAC TROMETHAMINE 10 MG/1
10 TABLET, FILM COATED ORAL EVERY 6 HOURS PRN
Qty: 20 TABLET | Refills: 0 | Status: SHIPPED | OUTPATIENT
Start: 2024-11-09

## 2024-11-09 RX ORDER — KETOROLAC TROMETHAMINE 30 MG/ML
30 INJECTION, SOLUTION INTRAMUSCULAR; INTRAVENOUS
Status: COMPLETED | OUTPATIENT
Start: 2024-11-09 | End: 2024-11-09

## 2024-11-09 RX ORDER — DEXAMETHASONE SODIUM PHOSPHATE 4 MG/ML
10 INJECTION, SOLUTION INTRA-ARTICULAR; INTRALESIONAL; INTRAMUSCULAR; INTRAVENOUS; SOFT TISSUE ONCE
Status: COMPLETED | OUTPATIENT
Start: 2024-11-09 | End: 2024-11-09

## 2024-11-09 RX ADMIN — KETOROLAC TROMETHAMINE 30 MG: 30 INJECTION, SOLUTION INTRAMUSCULAR at 21:34

## 2024-11-09 RX ADMIN — AMOXICILLIN AND CLAVULANATE POTASSIUM 1 TABLET: 875; 125 TABLET, FILM COATED ORAL at 22:37

## 2024-11-09 RX ADMIN — DEXAMETHASONE SODIUM PHOSPHATE 10 MG: 4 INJECTION INTRA-ARTICULAR; INTRALESIONAL; INTRAMUSCULAR; INTRAVENOUS; SOFT TISSUE at 21:34

## 2024-11-09 ASSESSMENT — PAIN SCALES - GENERAL: PAINLEVEL_OUTOF10: 6

## 2024-11-09 ASSESSMENT — PAIN DESCRIPTION - DESCRIPTORS: DESCRIPTORS: ACHING

## 2024-11-09 ASSESSMENT — PAIN DESCRIPTION - ORIENTATION: ORIENTATION: RIGHT

## 2024-11-09 ASSESSMENT — PAIN - FUNCTIONAL ASSESSMENT
PAIN_FUNCTIONAL_ASSESSMENT: 0-10
PAIN_FUNCTIONAL_ASSESSMENT: ACTIVITIES ARE NOT PREVENTED

## 2024-11-09 ASSESSMENT — PAIN DESCRIPTION - PAIN TYPE: TYPE: ACUTE PAIN

## 2024-11-09 ASSESSMENT — PAIN DESCRIPTION - LOCATION: LOCATION: THROAT

## 2024-11-10 NOTE — ED PROVIDER NOTES
[facility] EMERGENCY DEPT  EMERGENCY DEPARTMENT ENCOUNTER       Pt Name: Selin Burns  MRN: [id number]  Birthdate 1990  Date of evaluation: 11/9/2024  Provider: Luis Robertson MD   PCP: No primary care provider on file.  Note Started: 7:04 AM EST 11/10/24     CHIEF COMPLAINT       Chief Complaint   Patient presents with    Pharyngitis     HISTORY OF PRESENT ILLNESS: 1 or more elements      History From: patient, History limited by: none     Selin Burns is a 33 y.o. female with past medical history of peritonsillar abscess who presents to the emergency departments with sore throat over the past 1 to 2 days.  She reports that her throat is more sore on the right side and that she has pain with swallowing.  No recent fevers or chills.    Please See MDM for Additional Details of the HPI/PMH  Nursing Notes were all reviewed and agreed with or any disagreements were addressed in the HPI.     REVIEW OF SYSTEMS        Positives and Pertinent negatives as per HPI.    PAST HISTORY     Past Medical History:  Past Medical History:   Diagnosis Date    ADHD (attention deficit hyperactivity disorder)     Asthma     Headache     Substance induced mood disorder (HCC)        Past Surgical History:  Past Surgical History:   Procedure Laterality Date    APPENDECTOMY         Family History:  Family History   Problem Relation Age of Onset    ADHD Sister     Hypertension Sister     Asthma Sister     Hypertension Mother     Asthma Mother     Migraines Sister     Clotting Disorder Brother     Depression Brother     Hypertension Brother     Asthma Brother     Migraines Brother        Social History:  Social History     Tobacco Use    Smoking status: Former     Current packs/day: 1.00     Types: Cigarettes    Smokeless tobacco: Current    Tobacco comments:     Quit smoking: pt states she vapes   Vaping Use    Vaping status: Every Day    Substances: Nicotine   Substance Use Topics    Alcohol use: Not Currently    Drug use: Not

## 2024-11-10 NOTE — ED NOTES
Pt presents ambulatory to ED with family complaining of throat pain and painful swallowing x 2 days. Pt has a history of peritonsillar abscess. [place] [facility] [place] [facility] [place] and was prescribed penicillin but says that it didn't work. Pt reports taking full course of antibiotics. Pt is alert and oriented x 4, RR even and unlabored, skin is warm and dry. Small knot noted to right posterior chin. Assessment completed and pt updated on plan of care.       Emergency Department Nursing Plan of Care       The Nursing Plan of Care is developed from the Nursing assessment and Emergency Department Attending provider initial evaluation.  The plan of care may be reviewed in the “ED Provider note”.    The Plan of Care was developed with the following considerations:   Patient / Family readiness to learn indicated by:verbalized understanding  Persons(s) to be included in education: patient  Barriers to Learning/Limitations:None    Signed     Tonia Hughes RN    11/9/2024   9:11 PM

## 2024-11-10 NOTE — ED TRIAGE NOTES
Pt ambulatory to triage for R sd throat pain and painful swallowing x2 days. Pt denies fevers. Pt reports hx of peritonsillar abscess.

## 2024-11-10 NOTE — ED NOTES
Discharge instructions were given to the patient by Tonia SNYDER.     The patient left the Emergency Department alert and oriented and in no acute distress with 2 prescriptions. The patient was encouraged to call or return to the ED for worsening issues or problems and was encouraged to schedule a follow up appointment for continuing care.     Ambulation assessment completed before discharge.  Pt left Emergency Department ambulating at baseline with no ortho devices  Ortho device education: none    The patient verbalized understanding of discharge instructions and prescriptions, all questions were answered. The patient has no further concerns at this time.

## 2024-11-14 ENCOUNTER — HOSPITAL ENCOUNTER (EMERGENCY)
Facility: HOSPITAL | Age: 34
Discharge: HOME | End: 2024-11-14
Attending: STUDENT IN AN ORGANIZED HEALTH CARE EDUCATION/TRAINING PROGRAM

## 2024-11-14 VITALS
TEMPERATURE: 98.3 F | WEIGHT: 160 LBS | RESPIRATION RATE: 14 BRPM | OXYGEN SATURATION: 99 % | HEART RATE: 94 BPM | HEIGHT: 65 IN | BODY MASS INDEX: 26.66 KG/M2 | DIASTOLIC BLOOD PRESSURE: 80 MMHG | SYSTOLIC BLOOD PRESSURE: 112 MMHG

## 2024-11-14 DIAGNOSIS — K08.89 PAIN, DENTAL: Primary | ICD-10-CM

## 2024-11-14 PROCEDURE — 99283 EMERGENCY DEPT VISIT LOW MDM: CPT

## 2024-11-14 PROCEDURE — 6370000000 HC RX 637 (ALT 250 FOR IP): Performed by: STUDENT IN AN ORGANIZED HEALTH CARE EDUCATION/TRAINING PROGRAM

## 2024-11-14 RX ORDER — OXYCODONE AND ACETAMINOPHEN 5; 325 MG/1; MG/1
1 TABLET ORAL EVERY 6 HOURS PRN
Qty: 9 TABLET | Refills: 0 | Status: SHIPPED | OUTPATIENT
Start: 2024-11-14 | End: 2024-11-17

## 2024-11-14 RX ORDER — OXYCODONE AND ACETAMINOPHEN 5; 325 MG/1; MG/1
1 TABLET ORAL
Status: COMPLETED | OUTPATIENT
Start: 2024-11-14 | End: 2024-11-14

## 2024-11-14 RX ADMIN — DIPHENHYDRAMINE HYDROCHLORIDE: 25 SOLUTION ORAL at 01:57

## 2024-11-14 RX ADMIN — OXYCODONE HYDROCHLORIDE AND ACETAMINOPHEN 1 TABLET: 5; 325 TABLET ORAL at 01:39

## 2024-11-14 ASSESSMENT — PAIN DESCRIPTION - DESCRIPTORS: DESCRIPTORS: ACHING

## 2024-11-14 ASSESSMENT — PAIN - FUNCTIONAL ASSESSMENT: PAIN_FUNCTIONAL_ASSESSMENT: 0-10

## 2024-11-14 ASSESSMENT — PAIN DESCRIPTION - LOCATION: LOCATION: TEETH

## 2024-11-14 ASSESSMENT — PAIN DESCRIPTION - ORIENTATION: ORIENTATION: LEFT

## 2024-11-14 ASSESSMENT — PAIN SCALES - GENERAL: PAINLEVEL_OUTOF10: 10

## 2024-11-14 NOTE — ED TRIAGE NOTES
Pt returns to the ED with continued complaints of left dental pain. Unable to get in with a dentist. Requesting more dental balls.

## 2024-11-14 NOTE — ED NOTES
Discharge instructions were given to the patient by LILLIAN Gray.     The patient left the Emergency Department alert and oriented and in no acute distress with 1 prescription. The patient was encouraged to call or return to the ED for worsening issues or problems and was encouraged to schedule a follow up appointment for continuing care.     Ambulation assessment completed before discharge.  Pt left Emergency Department ambulating at baseline with no ortho devices  Ortho device education: none    The patient verbalized understanding of discharge instructions and prescriptions, all questions were answered. The patient has no further concerns at this time.       stated

## 2024-11-14 NOTE — ED PROVIDER NOTES
reasons why they would want to return to the ED prior to their follow-up appointment, should her condition change.    PLAN:  1.      Medication List        START taking these medications      oxyCODONE-acetaminophen 5-325 MG per tablet  Commonly known as: Percocet  Take 1 tablet by mouth every 6 hours as needed for Pain for up to 3 days. Intended supply: 3 days. Take lowest dose possible to manage pain Max Daily Amount: 4 tablets            ASK your doctor about these medications      amoxicillin 500 MG tablet  Commonly known as: AMOXIL     amoxicillin-clavulanate 875-125 MG per tablet  Commonly known as: AUGMENTIN  Take 1 tablet by mouth 2 times daily for 7 days     ciprofloxacin-dexAMETHasone 0.3-0.1 % otic suspension  Commonly known as: Ciprodex  Place 4 drops into the left ear 2 times daily     ibuprofen 800 MG tablet  Commonly known as: ADVIL;MOTRIN  Take 1 tablet by mouth every 8 hours as needed for Pain     ketorolac 10 MG tablet  Commonly known as: TORADOL  Take 1 tablet by mouth every 6 hours as needed for Pain     methylPREDNISolone 4 MG tablet  Commonly known as: MEDROL (TIMMY)  Take by mouth as directed     naproxen 500 MG tablet  Commonly known as: NAPROSYN  Take 1 tablet by mouth 2 times daily as needed for Pain     Naproxen Sodium 220 MG Caps     penicillin v potassium 500 MG tablet  Commonly known as: VEETID  Take 1 tablet by mouth 4 times daily for 10 days               Where to Get Your Medications        [id number] - [place], [place] - [address] - P [phone] - F [phone]  [address] [place] [address]      Phone: [phone]   oxyCODONE-acetaminophen 5-325 MG per tablet       2. Dental services    Schedule an appointment as soon as possible for a visit in 2 days      [facility] EMERGENCY DEPT  [address] [facility] [address] [phone]    As needed, If symptoms worsen    Return to ED if worse     Diagnosis

## 2024-11-14 NOTE — DISCHARGE INSTRUCTIONS
Emergency Dental Care     Allan CALDERA [facility] - Operated by [facility]  [address]; [place], [place] [address]  Open M-F: 8AM - 5PM  Main Phone: [phone]  Pediatric Phone: [phone]  $70 for Emergency Care  $60 for first routine care, then pay by sliding scale based upon income.    [facility]  [address]; [place] [address]  Phone: [phone]    [employer]  [facility]: [address]; [place] [address]  [facility]: [address] [place] [address]; [place] [address]  Phone: [phone]  Open Monday - Friday 8AM-4:30 PM  Emergency Walk-In Care: Monday-Thursday 8 AM-12 PM    [facility]  [facility], [facility], [address] [place] [address], 1st Floor  First Come First Service starting at 8:30 AM M-F  Phone: [phone], press 2  Fee: $150 per tooth (x-ray & extractions only)  Pediatrics Phone: [phone], 8-5 M-F    [facility]  [facility], [facility], [address] [place] [address], 2nd Floor, [address]; [place], [place]  First Come First Service starting at 8:30 AM-3 PM M - F    [facility]  [address] [place] [address]  Phone: [phone] or [phone]  Emergency Hours: 9:30AM - 11AM (extractions)  Simple tooth extraction $ per tooth. $75 for x-ray    [facility]  [address] [place] [address]  [facility] Residents only, over the age of 18  Phone: [phone]. Leave message saying you need an appointment to register.  Hours: Tuesday Evenings

## 2024-11-14 NOTE — ED NOTES
Pt presents ambulatory to ED complaining of left dental pain. Pt is alert and oriented x 4, RR even and unlabored, skin is warm and dry. Assesment completed and pt updated on plan of care.       Emergency Department Nursing Plan of Care       The Nursing Plan of Care is developed from the Nursing assessment and Emergency Department Attending provider initial evaluation.  The plan of care may be reviewed in the “ED Provider note”.    The Plan of Care was developed with the following considerations:   Patient / Family readiness to learn indicated by:verbalized understanding  Persons(s) to be included in education: patient  Barriers to Learning/Limitations:None    Signed     Marina Mantilla RN    11/14/2024   1:58 AM

## 2024-12-07 ENCOUNTER — HOSPITAL ENCOUNTER (EMERGENCY)
Facility: HOSPITAL | Age: 34
Discharge: HOME | End: 2024-12-07
Attending: EMERGENCY MEDICINE
Payer: MEDICAID

## 2024-12-07 VITALS
WEIGHT: 150 LBS | BODY MASS INDEX: 24.99 KG/M2 | TEMPERATURE: 98.6 F | SYSTOLIC BLOOD PRESSURE: 138 MMHG | HEART RATE: 75 BPM | HEIGHT: 65 IN | OXYGEN SATURATION: 98 % | RESPIRATION RATE: 20 BRPM | DIASTOLIC BLOOD PRESSURE: 102 MMHG

## 2024-12-07 DIAGNOSIS — T16.1XXA FOREIGN BODY OF RIGHT EAR, INITIAL ENCOUNTER: ICD-10-CM

## 2024-12-07 DIAGNOSIS — K08.89 PAIN, DENTAL: Primary | ICD-10-CM

## 2024-12-07 PROCEDURE — 69200 CLEAR OUTER EAR CANAL: CPT

## 2024-12-07 PROCEDURE — 96372 THER/PROPH/DIAG INJ SC/IM: CPT

## 2024-12-07 PROCEDURE — 99284 EMERGENCY DEPT VISIT MOD MDM: CPT

## 2024-12-07 PROCEDURE — 6360000002 HC RX W HCPCS: Performed by: PHYSICIAN ASSISTANT

## 2024-12-07 PROCEDURE — 6370000000 HC RX 637 (ALT 250 FOR IP): Performed by: PHYSICIAN ASSISTANT

## 2024-12-07 RX ORDER — OXYCODONE AND ACETAMINOPHEN 5; 325 MG/1; MG/1
1 TABLET ORAL EVERY 6 HOURS PRN
Qty: 12 TABLET | Refills: 0 | Status: SHIPPED | OUTPATIENT
Start: 2024-12-07 | End: 2024-12-07 | Stop reason: CLARIF

## 2024-12-07 RX ORDER — OXYCODONE AND ACETAMINOPHEN 5; 325 MG/1; MG/1
1 TABLET ORAL
Status: COMPLETED | OUTPATIENT
Start: 2024-12-07 | End: 2024-12-07

## 2024-12-07 RX ORDER — KETOROLAC TROMETHAMINE 30 MG/ML
30 INJECTION, SOLUTION INTRAMUSCULAR; INTRAVENOUS
Status: COMPLETED | OUTPATIENT
Start: 2024-12-07 | End: 2024-12-07

## 2024-12-07 RX ORDER — PENICILLIN V POTASSIUM 250 MG/1
500 TABLET, FILM COATED ORAL
Status: COMPLETED | OUTPATIENT
Start: 2024-12-07 | End: 2024-12-07

## 2024-12-07 RX ORDER — NAPROXEN 500 MG/1
500 TABLET ORAL 2 TIMES DAILY
Qty: 20 TABLET | Refills: 0 | Status: SHIPPED | OUTPATIENT
Start: 2024-12-07

## 2024-12-07 RX ORDER — CLINDAMYCIN HYDROCHLORIDE 300 MG/1
300 CAPSULE ORAL 3 TIMES DAILY
Qty: 21 CAPSULE | Refills: 0 | Status: SHIPPED | OUTPATIENT
Start: 2024-12-07 | End: 2024-12-14

## 2024-12-07 RX ADMIN — KETOROLAC TROMETHAMINE 30 MG: 30 INJECTION, SOLUTION INTRAMUSCULAR at 10:20

## 2024-12-07 RX ADMIN — OXYCODONE HYDROCHLORIDE AND ACETAMINOPHEN 1 TABLET: 5; 325 TABLET ORAL at 10:19

## 2024-12-07 RX ADMIN — PENICILLIN V POTASSIUM 500 MG: 250 TABLET, FILM COATED ORAL at 10:19

## 2024-12-07 RX ADMIN — DIPHENHYDRAMINE HCL ORAL: 25 SOLUTION ORAL at 10:16

## 2024-12-07 ASSESSMENT — PAIN SCALES - GENERAL: PAINLEVEL_OUTOF10: 10

## 2024-12-07 ASSESSMENT — PAIN DESCRIPTION - ORIENTATION: ORIENTATION: RIGHT

## 2024-12-07 ASSESSMENT — PAIN - FUNCTIONAL ASSESSMENT: PAIN_FUNCTIONAL_ASSESSMENT: 0-10

## 2024-12-07 ASSESSMENT — PAIN DESCRIPTION - LOCATION: LOCATION: TEETH

## 2024-12-07 NOTE — ED NOTES
Patient stable GCS15  Not in any form of distress   Ambulatory walking out   Discharged summary given and understood

## 2024-12-07 NOTE — ED PROVIDER NOTES
\[facility]\"" EMERGENCY DEPT  EMERGENCY DEPARTMENT ENCOUNTER       Pt Name: Selin Burns  MRN: [id number]  Birthdate 1990  Date of Evaluation: 12/7/2024  Provider: Molly Verdugo PA-C   PCP: No primary care provider on file.  Note Started: 11:46 AM 12/7/24     CHIEF COMPLAINT       Chief Complaint   Patient presents with    Dental Pain     Pt presents ambulatory to triage w/ complaints of R sided tooth pain x1 week. Denies known injury, pt is unable to sit still due to pain at this time         HISTORY OF PRESENT ILLNESS: 1 or more elements      History From: Patient  None     Selin Burns is a 33 y.o. female who presents to the ED today dental pain.  Right upper and lower.  Has been hurting for a while.  Is supposed to be seeing the dentist on the 10th.  However increase in pain.  Presents here for further evaluation     Nursing Notes were all reviewed and agreed with or any disagreements were addressed in the HPI.     REVIEW OF SYSTEMS      Review of Systems     Positives and Pertinent negatives as per HPI.    PAST HISTORY     Past Medical History:  Past Medical History:   Diagnosis Date    ADHD (attention deficit hyperactivity disorder)     Asthma     Headache     Substance induced mood disorder (HCC)        Past Surgical History:  Past Surgical History:   Procedure Laterality Date    APPENDECTOMY         Family History:  Family History   Problem Relation Age of Onset    ADHD Sister     Hypertension Sister     Asthma Sister     Hypertension Mother     Asthma Mother     Migraines Sister     Clotting Disorder Brother     Depression Brother     Hypertension Brother     Asthma Brother     Migraines Brother        Social History:  Social History     Tobacco Use    Smoking status: Former     Current packs/day: 1.00     Types: Cigarettes    Smokeless tobacco: Current    Tobacco comments:     Quit smoking: pt states she vapes   Vaping Use    Vaping status: Every Day    Substances: Nicotine   Substance Use

## 2025-02-06 ENCOUNTER — HOSPITAL ENCOUNTER (EMERGENCY)
Facility: HOSPITAL | Age: 35
Discharge: HOME | End: 2025-02-06
Attending: EMERGENCY MEDICINE
Payer: MEDICAID

## 2025-02-06 VITALS
TEMPERATURE: 98.1 F | WEIGHT: 150 LBS | HEART RATE: 60 BPM | SYSTOLIC BLOOD PRESSURE: 117 MMHG | OXYGEN SATURATION: 98 % | DIASTOLIC BLOOD PRESSURE: 78 MMHG | RESPIRATION RATE: 14 BRPM | HEIGHT: 65 IN | BODY MASS INDEX: 24.99 KG/M2

## 2025-02-06 DIAGNOSIS — T16.1XXA FOREIGN BODY IN AURICLE OF RIGHT EAR, INITIAL ENCOUNTER: ICD-10-CM

## 2025-02-06 DIAGNOSIS — H60.391 INFECTIVE OTITIS EXTERNA OF RIGHT EAR: Primary | ICD-10-CM

## 2025-02-06 PROCEDURE — 69200 CLEAR OUTER EAR CANAL: CPT

## 2025-02-06 PROCEDURE — 6370000000 HC RX 637 (ALT 250 FOR IP): Performed by: EMERGENCY MEDICINE

## 2025-02-06 PROCEDURE — 99283 EMERGENCY DEPT VISIT LOW MDM: CPT

## 2025-02-06 RX ORDER — LIDOCAINE HYDROCHLORIDE 20 MG/ML
15 SOLUTION OROPHARYNGEAL
Status: COMPLETED | OUTPATIENT
Start: 2025-02-06 | End: 2025-02-06

## 2025-02-06 RX ORDER — OFLOXACIN 3 MG/ML
5 SOLUTION AURICULAR (OTIC) 2 TIMES DAILY
Qty: 5 ML | Refills: 0 | Status: SHIPPED | OUTPATIENT
Start: 2025-02-06 | End: 2025-02-13

## 2025-02-06 RX ADMIN — LIDOCAINE HYDROCHLORIDE 15 ML: 20 SOLUTION ORAL at 11:37

## 2025-02-06 ASSESSMENT — PAIN DESCRIPTION - ORIENTATION: ORIENTATION: RIGHT

## 2025-02-06 ASSESSMENT — PAIN DESCRIPTION - DESCRIPTORS: DESCRIPTORS: ACHING

## 2025-02-06 ASSESSMENT — PAIN - FUNCTIONAL ASSESSMENT: PAIN_FUNCTIONAL_ASSESSMENT: 0-10

## 2025-02-06 ASSESSMENT — PAIN SCALES - GENERAL: PAINLEVEL_OUTOF10: 10

## 2025-02-06 ASSESSMENT — PAIN DESCRIPTION - LOCATION: LOCATION: EAR

## 2025-02-06 NOTE — ED TRIAGE NOTES
Pt arrives to ED due to having paper in her right ear x2-3 days. Pt denies drainage from ear, difficulty hearing or known fevers.

## 2025-02-06 NOTE — ED PROVIDER NOTES
[facility] EMERGENCY DEPARTMENT  EMERGENCY DEPARTMENT ENCOUNTER       Pt Name: Selni Burns  MRN: [id number]  Birthdate 1990  Date of evaluation: 2/6/2025  Provider: Ross Kwan MD   PCP: No primary care provider on file.  Note Started: 12:27 PM 2/6/25     CHIEF COMPLAINT       Chief Complaint   Patient presents with    Foreign Body in Ear        HISTORY OF PRESENT ILLNESS: 1 or more elements      History From: Patient, History limited by: No limitations     Selin Burns is a 34 y.o. female who presents with chief complaint of foreign body in the right ear.  Patient endorses pain and discomfort due to having tissue paper in the right ear for the past few weeks.  Denies any fevers or otorrhea     Nursing Notes were all reviewed and agreed with or any disagreements were addressed in the HPI.     REVIEW OF SYSTEMS        Positives and Pertinent negatives as per HPI.    PAST HISTORY     Past Medical History:  Past Medical History:   Diagnosis Date    ADHD (attention deficit hyperactivity disorder)     Asthma     Headache     Substance induced mood disorder (HCC)        Past Surgical History:  Past Surgical History:   Procedure Laterality Date    APPENDECTOMY         Family History:  Family History   Problem Relation Age of Onset    ADHD Sister     Hypertension Sister     Asthma Sister     Hypertension Mother     Asthma Mother     Migraines Sister     Clotting Disorder Brother     Depression Brother     Hypertension Brother     Asthma Brother     Migraines Brother        Social History:  Social History     Tobacco Use    Smoking status: Former     Current packs/day: 1.00     Types: Cigarettes    Smokeless tobacco: Current    Tobacco comments:     Quit smoking: pt states she vapes   Vaping Use    Vaping status: Every Day    Substances: Nicotine   Substance Use Topics    Alcohol use: Not Currently    Drug use: Not Currently     Types: Other, Heroin, Marijuana (Weed)       Allergies:  Allergies

## 2025-02-06 NOTE — ED NOTES
Upon rinsing the ear a small cotton ball piece with scant amount of ear wax came out into basin. MD Aquiles notified. Pt verbalized she could hear better.

## 2025-02-06 NOTE — ED NOTES
Pt presents to ED via home complaining of having paper in her right ear for 2-3 days. Pt is alert and oriented x 4, RR even and unlabored, skin is warm and dry. Pt appears in NAD at this time. Assessment completed and pt updated on plan of care.  Call bell in reach.     Emergency Department Nursing Plan of Care  The Nursing Plan of Care is developed from the Nursing assessment and Emergency Department Attending provider initial evaluation.  The plan of care may be reviewed in the “ED Provider note”.  The Plan of Care was developed with the following considerations:  Patient / Family readiness to learn indicated by:Refer to Medical chart in [facility]  Persons(s) to be included in education: Refer to Medical chart in [facility]  Barriers to Learning/Limitations:Normal

## 2025-02-06 NOTE — ED NOTES
Discharge instructions were given to the patient by LILLIAN Ayon.     The patient left the Emergency Department alert and oriented and in no acute distress with 1 prescriptions. The patient was encouraged to call or return to the ED for worsening issues or problems and was encouraged to schedule a follow up appointment for continuing care.     Ambulation assessment completed before discharge.  Pt left Emergency Department ambulating at baseline with no ortho devices  Ortho device education: none    The patient verbalized understanding of discharge instructions and prescriptions, all questions were answered. The patient has no further concerns at this time.

## 2025-05-16 ENCOUNTER — HOSPITAL ENCOUNTER (EMERGENCY)
Facility: HOSPITAL | Age: 35
Discharge: HOME | End: 2025-05-16
Payer: MEDICAID

## 2025-05-16 VITALS
RESPIRATION RATE: 18 BRPM | OXYGEN SATURATION: 100 % | HEART RATE: 95 BPM | HEIGHT: 67 IN | BODY MASS INDEX: 24.36 KG/M2 | SYSTOLIC BLOOD PRESSURE: 115 MMHG | TEMPERATURE: 97.3 F | DIASTOLIC BLOOD PRESSURE: 81 MMHG | WEIGHT: 155.2 LBS

## 2025-05-16 DIAGNOSIS — K08.89 DENTALGIA: Primary | ICD-10-CM

## 2025-05-16 PROCEDURE — 99283 EMERGENCY DEPT VISIT LOW MDM: CPT

## 2025-05-16 PROCEDURE — 6370000000 HC RX 637 (ALT 250 FOR IP): Performed by: NURSE PRACTITIONER

## 2025-05-16 RX ORDER — AMOXICILLIN 875 MG/1
875 TABLET, COATED ORAL 2 TIMES DAILY
Qty: 14 TABLET | Refills: 0 | Status: SHIPPED | OUTPATIENT
Start: 2025-05-16 | End: 2025-05-23

## 2025-05-16 RX ORDER — HYDROCODONE BITARTRATE AND ACETAMINOPHEN 5; 325 MG/1; MG/1
1 TABLET ORAL
Refills: 0 | Status: COMPLETED | OUTPATIENT
Start: 2025-05-16 | End: 2025-05-16

## 2025-05-16 RX ORDER — HYDROCODONE BITARTRATE AND ACETAMINOPHEN 5; 325 MG/1; MG/1
1 TABLET ORAL EVERY 8 HOURS PRN
Qty: 6 TABLET | Refills: 0 | Status: SHIPPED | OUTPATIENT
Start: 2025-05-16 | End: 2025-05-18

## 2025-05-16 RX ADMIN — HYDROCODONE BITARTRATE AND ACETAMINOPHEN 1 TABLET: 5; 325 TABLET ORAL at 17:12

## 2025-05-16 ASSESSMENT — PAIN SCALES - GENERAL: PAINLEVEL_OUTOF10: 10

## 2025-05-16 ASSESSMENT — PAIN DESCRIPTION - DESCRIPTORS: DESCRIPTORS: ACHING

## 2025-05-16 ASSESSMENT — PAIN - FUNCTIONAL ASSESSMENT: PAIN_FUNCTIONAL_ASSESSMENT: 0-10

## 2025-05-16 ASSESSMENT — PAIN DESCRIPTION - ORIENTATION: ORIENTATION: RIGHT

## 2025-05-16 ASSESSMENT — PAIN DESCRIPTION - LOCATION: LOCATION: TEETH

## 2025-05-16 NOTE — ED TRIAGE NOTES
Pt c/o of Right sided toothache d/t her bottom and top tooth being broken. Pt reports seeing the Dentist x 2 month. Pt reports not being able to get an appt to Dentistry 10/10 pain,

## 2025-05-16 NOTE — ED NOTES
Pt presents ambulatory to the ED c/o r sided dental pain both upper and lower. Has not been able to follow up with dentistry. Pt has been taking \"everything for pain\". Pt is crying and yelling while trying to do the assessment. Pt endorses vaping.     Emergency Department Nursing Plan of Care       The Nursing Plan of Care is developed from the Nursing assessment and Emergency Department Attending provider initial evaluation.  The plan of care may be reviewed in the “ED Provider note”.      The Plan of Care was developed with the following considerations:  Patient / Family readiness to learn indicated by:verbalized understanding  Persons(s) to be included in education: patient  Barriers to Learning/Limitations:None      Signed     ROCHELLE CABEZAS RN    5/16/2025   5:05 PM

## 2025-05-17 ASSESSMENT — ENCOUNTER SYMPTOMS
SHORTNESS OF BREATH: 0
BACK PAIN: 0
ABDOMINAL PAIN: 0

## 2025-05-17 NOTE — ED PROVIDER NOTES
[facility] EMERGENCY DEPARTMENT  EMERGENCY DEPARTMENT ENCOUNTER       Pt Name: Selin Burns  MRN: [id number]  Birthdate 1990  Date of evaluation: 5/16/2025  Provider: WADE Ureña NP   PCP: No primary care provider on file.  Note Started: 5:21 PM 5/17/25     CHIEF COMPLAINT       Chief Complaint   Patient presents with    Dental Pain        HISTORY OF PRESENT ILLNESS: 1 or more elements      History Provided by: Patient   History is limited by: Nothing     Selin Burns is a 34 y.o. female who presents cc dental pain which has been ongoing.  Patient has recently had multiple extractions and patient's mother states patient has had pain since then.  Patient reports tooth is broken.  Patient is yelling and crying in pain.     Nursing Notes were all reviewed and agreed with or any disagreements were addressed in the HPI.     REVIEW OF SYSTEMS      Review of Systems   Constitutional:  Negative for fever.   HENT:  Positive for dental problem. Negative for congestion.    Eyes:  Negative for visual disturbance.   Respiratory:  Negative for shortness of breath.    Cardiovascular:  Negative for chest pain.   Gastrointestinal:  Negative for abdominal pain.   Musculoskeletal:  Negative for back pain and neck pain.   Skin:  Negative for rash.   Neurological:  Negative for dizziness, weakness and headaches.   All other systems reviewed and are negative.       Positives and Pertinent negatives as per HPI.    PAST HISTORY     Past Medical History:  Past Medical History:   Diagnosis Date    ADHD (attention deficit hyperactivity disorder)     Asthma     Headache     Substance induced mood disorder (HCC)        Past Surgical History:  Past Surgical History:   Procedure Laterality Date    APPENDECTOMY         Family History:  Family History   Problem Relation Age of Onset    ADHD Sister     Hypertension Sister     Asthma Sister     Hypertension Mother     Asthma Mother     Migraines Sister      Clotting Disorder Brother     Depression Brother     Hypertension Brother     Asthma Brother     Migraines Brother        Social History:  Social History     Tobacco Use    Smoking status: Former     Current packs/day: 1.00     Types: Cigarettes    Smokeless tobacco: Current    Tobacco comments:     Quit smoking: pt states she vapes   Vaping Use    Vaping status: Every Day    Substances: Nicotine   Substance Use Topics    Alcohol use: Not Currently    Drug use: Not Currently     Types: Other, Heroin, Marijuana (Weed)       Allergies:  No Known Allergies    CURRENT MEDICATIONS      Discharge Medication List as of 5/16/2025  5:44 PM        CONTINUE these medications which have NOT CHANGED    Details   !! naproxen (NAPROSYN) 500 MG tablet Take 1 tablet by mouth 2 times daily, Disp-20 tablet, R-0Take with food.  Do not take with other NSAIDsNormal      ketorolac (TORADOL) 10 MG tablet Take 1 tablet by mouth every 6 hours as needed for Pain, Disp-20 tablet, R-0Normal      ibuprofen (ADVIL;MOTRIN) 800 MG tablet Take 1 tablet by mouth every 8 hours as needed for Pain, Disp-30 tablet, R-1Normal      !! naproxen (NAPROSYN) 500 MG tablet Take 1 tablet by mouth 2 times daily as needed for Pain, Disp-40 tablet, R-0Normal      ciprofloxacin-dexAMETHasone (CIPRODEX) 0.3-0.1 % otic suspension Place 4 drops into the left ear 2 times daily, Disp-7.5 mL, R-0Normal      methylPREDNISolone (MEDROL, TIMMY,) 4 MG tablet Take by mouth as directed, Disp-1 kit, R-0Normal      Naproxen Sodium 220 MG CAPS Take 220 mg by mouth 2 times dailyHistorical Med       !! - Potential duplicate medications found. Please discuss with provider.          PHYSICAL EXAM      Vitals:    05/16/25 1648 05/16/25 1650   BP:  115/81   Pulse:  95   Resp:  18   Temp: 97.3 °F (36.3 °C)    TempSrc: Oral    SpO2:  100%   Weight:  70.4 kg (155 lb 3.3 oz)   Height:  1.702 m (5' 7\")       Physical Exam  Vitals and nursing note reviewed.   Constitutional:       Appearance:

## 2025-05-18 ENCOUNTER — HOSPITAL ENCOUNTER (EMERGENCY)
Facility: HOSPITAL | Age: 35
Discharge: HOME | End: 2025-05-18
Payer: MEDICAID

## 2025-05-18 VITALS
SYSTOLIC BLOOD PRESSURE: 108 MMHG | OXYGEN SATURATION: 100 % | RESPIRATION RATE: 15 BRPM | WEIGHT: 155.87 LBS | BODY MASS INDEX: 24.46 KG/M2 | TEMPERATURE: 97.3 F | DIASTOLIC BLOOD PRESSURE: 81 MMHG | HEIGHT: 67 IN | HEART RATE: 72 BPM

## 2025-05-18 DIAGNOSIS — K02.9 DENTAL CARIES: ICD-10-CM

## 2025-05-18 DIAGNOSIS — K08.89 DENTALGIA: Primary | ICD-10-CM

## 2025-05-18 PROCEDURE — 99283 EMERGENCY DEPT VISIT LOW MDM: CPT

## 2025-05-18 PROCEDURE — 6370000000 HC RX 637 (ALT 250 FOR IP): Performed by: PHYSICIAN ASSISTANT

## 2025-05-18 RX ORDER — IBUPROFEN 800 MG/1
800 TABLET, FILM COATED ORAL EVERY 8 HOURS PRN
Qty: 30 TABLET | Refills: 0 | Status: SHIPPED | OUTPATIENT
Start: 2025-05-18 | End: 2025-06-07

## 2025-05-18 RX ADMIN — BENZOCAINE: 200 SPRAY DENTAL; ORAL; PERIODONTAL at 10:26

## 2025-05-18 ASSESSMENT — PAIN DESCRIPTION - LOCATION: LOCATION: TEETH

## 2025-05-18 ASSESSMENT — PAIN SCALES - GENERAL: PAINLEVEL_OUTOF10: 7

## 2025-05-18 ASSESSMENT — PAIN DESCRIPTION - ORIENTATION: ORIENTATION: RIGHT

## 2025-05-18 NOTE — DISCHARGE INSTRUCTIONS
Emergency Dental Care     Allan CALDERA [facility]  [address]; [place], [place] [address]  Open M-F: 8AM - 5PM  Main Phone: [phone]  Pediatric Phone: [phone]  $70 for Emergency Care  $60 for first routine care, then pay by sliding scale based upon income.    [facility]  [address]; [place] [address]  Phone: [phone]    [employer]  [facility]: [address]; [place] [address]  [facility]: [address] [place] [address]; [place] [address]  Phone: [phone]  Open Monday - Friday 8AM-4:30 PM  Emergency Walk-In Care: Monday-Thursday 8 AM-12 PM    [facility]  [facility], [facility], [address] [place] [address], 1st Floor  First Come First Service starting at 8:30 AM M-F  Phone: [phone], press 2  Fee: $150 per tooth (x-ray & extractions only)  Pediatrics Phone: [phone], 8-5 M-F    [facility]  [facility], [facility], [address] [place] [address], 2nd Floor, [address]; [place], [place]  First Come First Service starting at 8:30 AM-3 PM M - F    [facility]  [address] [place] [address]  Phone: [phone] or [phone]  Emergency Hours: 9:30AM - 11AM (extractions)  Simple tooth extraction $ per tooth. $75 for x-ray    [facility]  [address] [place] [address]  [facility] Residents only, over the age of 18  Phone: [phone]. Leave message saying you need an appointment to register.  Hours: Tuesday Evenings     It was a pleasure taking care of you at [facility] today.      We know that when you come to [facility], you are entrusting us with your health, comfort, and safety.  Our physicians and nurses honor that trust, and we appreciate the opportunity to care for you and your loved ones.  We also value your

## 2025-07-08 ENCOUNTER — HOSPITAL ENCOUNTER (EMERGENCY)
Facility: HOSPITAL | Age: 35
Discharge: HOME | End: 2025-07-08
Attending: STUDENT IN AN ORGANIZED HEALTH CARE EDUCATION/TRAINING PROGRAM
Payer: MEDICAID

## 2025-07-08 ENCOUNTER — APPOINTMENT (OUTPATIENT)
Facility: HOSPITAL | Age: 35
End: 2025-07-08
Payer: MEDICAID

## 2025-07-08 VITALS
BODY MASS INDEX: 25.9 KG/M2 | OXYGEN SATURATION: 98 % | RESPIRATION RATE: 17 BRPM | TEMPERATURE: 98.6 F | HEIGHT: 67 IN | WEIGHT: 165 LBS | DIASTOLIC BLOOD PRESSURE: 91 MMHG | SYSTOLIC BLOOD PRESSURE: 129 MMHG | HEART RATE: 67 BPM

## 2025-07-08 DIAGNOSIS — G43.809 OTHER MIGRAINE WITHOUT STATUS MIGRAINOSUS, NOT INTRACTABLE: Primary | ICD-10-CM

## 2025-07-08 LAB
ALBUMIN SERPL-MCNC: 3.3 G/DL (ref 3.5–5)
ALBUMIN/GLOB SERPL: 1.1 (ref 1.1–2.2)
ALP SERPL-CCNC: 35 U/L (ref 45–117)
ALT SERPL-CCNC: 21 U/L (ref 12–78)
ANION GAP SERPL CALC-SCNC: 5 MMOL/L (ref 2–12)
AST SERPL-CCNC: 16 U/L (ref 15–37)
BASOPHILS # BLD: 0.04 K/UL (ref 0–0.1)
BASOPHILS NFR BLD: 0.4 % (ref 0–1)
BILIRUB SERPL-MCNC: 0.2 MG/DL (ref 0.2–1)
BUN SERPL-MCNC: 13 MG/DL (ref 6–20)
BUN/CREAT SERPL: 15 (ref 12–20)
CALCIUM SERPL-MCNC: 8.9 MG/DL (ref 8.5–10.1)
CHLORIDE SERPL-SCNC: 108 MMOL/L (ref 97–108)
CO2 SERPL-SCNC: 28 MMOL/L (ref 21–32)
CREAT SERPL-MCNC: 0.85 MG/DL (ref 0.55–1.02)
DIFFERENTIAL METHOD BLD: ABNORMAL
EOSINOPHIL # BLD: 0.51 K/UL (ref 0–0.4)
EOSINOPHIL NFR BLD: 4.6 % (ref 0–7)
ERYTHROCYTE [DISTWIDTH] IN BLOOD BY AUTOMATED COUNT: 14 % (ref 11.5–14.5)
GLOBULIN SER CALC-MCNC: 3 G/DL (ref 2–4)
GLUCOSE SERPL-MCNC: 100 MG/DL (ref 65–100)
HCT VFR BLD AUTO: 32.1 % (ref 35–47)
HGB BLD-MCNC: 10.7 G/DL (ref 11.5–16)
IMM GRANULOCYTES # BLD AUTO: 0.03 K/UL (ref 0–0.04)
IMM GRANULOCYTES NFR BLD AUTO: 0.3 % (ref 0–0.5)
LYMPHOCYTES # BLD: 2.71 K/UL (ref 0.8–3.5)
LYMPHOCYTES NFR BLD: 24.7 % (ref 12–49)
MCH RBC QN AUTO: 28 PG (ref 26–34)
MCHC RBC AUTO-ENTMCNC: 33.3 G/DL (ref 30–36.5)
MCV RBC AUTO: 84 FL (ref 80–99)
MONOCYTES # BLD: 0.37 K/UL (ref 0–1)
MONOCYTES NFR BLD: 3.4 % (ref 5–13)
NEUTS SEG # BLD: 7.33 K/UL (ref 1.8–8)
NEUTS SEG NFR BLD: 66.6 % (ref 32–75)
NRBC # BLD: 0 K/UL (ref 0–0.01)
NRBC BLD-RTO: 0 PER 100 WBC
PLATELET # BLD AUTO: 588 K/UL (ref 150–400)
PMV BLD AUTO: 9.8 FL (ref 8.9–12.9)
POTASSIUM SERPL-SCNC: 4.4 MMOL/L (ref 3.5–5.1)
PROT SERPL-MCNC: 6.3 G/DL (ref 6.4–8.2)
RBC # BLD AUTO: 3.82 M/UL (ref 3.8–5.2)
SODIUM SERPL-SCNC: 141 MMOL/L (ref 136–145)
WBC # BLD AUTO: 11 K/UL (ref 3.6–11)

## 2025-07-08 PROCEDURE — 99284 EMERGENCY DEPT VISIT MOD MDM: CPT

## 2025-07-08 PROCEDURE — 96366 THER/PROPH/DIAG IV INF ADDON: CPT

## 2025-07-08 PROCEDURE — 85025 COMPLETE CBC W/AUTO DIFF WBC: CPT

## 2025-07-08 PROCEDURE — 70450 CT HEAD/BRAIN W/O DYE: CPT

## 2025-07-08 PROCEDURE — 80053 COMPREHEN METABOLIC PANEL: CPT

## 2025-07-08 PROCEDURE — 2580000003 HC RX 258: Performed by: STUDENT IN AN ORGANIZED HEALTH CARE EDUCATION/TRAINING PROGRAM

## 2025-07-08 PROCEDURE — 96365 THER/PROPH/DIAG IV INF INIT: CPT

## 2025-07-08 PROCEDURE — 36415 COLL VENOUS BLD VENIPUNCTURE: CPT

## 2025-07-08 PROCEDURE — 6360000002 HC RX W HCPCS: Performed by: STUDENT IN AN ORGANIZED HEALTH CARE EDUCATION/TRAINING PROGRAM

## 2025-07-08 PROCEDURE — 96375 TX/PRO/DX INJ NEW DRUG ADDON: CPT

## 2025-07-08 RX ORDER — DIPHENHYDRAMINE HYDROCHLORIDE 50 MG/ML
25 INJECTION, SOLUTION INTRAMUSCULAR; INTRAVENOUS
Status: COMPLETED | OUTPATIENT
Start: 2025-07-08 | End: 2025-07-08

## 2025-07-08 RX ORDER — PROCHLORPERAZINE EDISYLATE 5 MG/ML
10 INJECTION INTRAMUSCULAR; INTRAVENOUS ONCE
Status: COMPLETED | OUTPATIENT
Start: 2025-07-08 | End: 2025-07-08

## 2025-07-08 RX ORDER — MAGNESIUM SULFATE 1 G/100ML
1000 INJECTION INTRAVENOUS
Status: COMPLETED | OUTPATIENT
Start: 2025-07-08 | End: 2025-07-08

## 2025-07-08 RX ORDER — BUTALBITAL, ACETAMINOPHEN AND CAFFEINE 50; 325; 40 MG/1; MG/1; MG/1
1 TABLET ORAL EVERY 4 HOURS PRN
Qty: 20 TABLET | Refills: 0 | Status: SHIPPED | OUTPATIENT
Start: 2025-07-08

## 2025-07-08 RX ORDER — KETOROLAC TROMETHAMINE 30 MG/ML
15 INJECTION, SOLUTION INTRAMUSCULAR; INTRAVENOUS
Status: COMPLETED | OUTPATIENT
Start: 2025-07-08 | End: 2025-07-08

## 2025-07-08 RX ORDER — 0.9 % SODIUM CHLORIDE 0.9 %
1000 INTRAVENOUS SOLUTION INTRAVENOUS ONCE
Status: COMPLETED | OUTPATIENT
Start: 2025-07-08 | End: 2025-07-08

## 2025-07-08 RX ADMIN — MAGNESIUM SULFATE HEPTAHYDRATE 1000 MG: 1 INJECTION, SOLUTION INTRAVENOUS at 15:50

## 2025-07-08 RX ADMIN — PROCHLORPERAZINE EDISYLATE 10 MG: 5 INJECTION INTRAMUSCULAR; INTRAVENOUS at 15:51

## 2025-07-08 RX ADMIN — DIPHENHYDRAMINE HYDROCHLORIDE 25 MG: 50 INJECTION INTRAMUSCULAR; INTRAVENOUS at 15:50

## 2025-07-08 RX ADMIN — SODIUM CHLORIDE 1000 ML: 0.9 INJECTION, SOLUTION INTRAVENOUS at 15:47

## 2025-07-08 RX ADMIN — KETOROLAC TROMETHAMINE 15 MG: 30 INJECTION, SOLUTION INTRAMUSCULAR at 17:16

## 2025-07-08 ASSESSMENT — PAIN DESCRIPTION - LOCATION: LOCATION: HEAD

## 2025-07-08 ASSESSMENT — PAIN - FUNCTIONAL ASSESSMENT: PAIN_FUNCTIONAL_ASSESSMENT: 0-10

## 2025-07-08 ASSESSMENT — PAIN SCALES - GENERAL
PAINLEVEL_OUTOF10: 8
PAINLEVEL_OUTOF10: 3

## 2025-07-08 NOTE — ED TRIAGE NOTES
Pt presents ambulatory to ED complaining of left-sided headache, vomiting, and lightheadedness since last week. Pt denies vision changes. Pt reports this happens around this time every year. 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 NIH 0 in triage.

## 2025-07-08 NOTE — ED PROVIDER NOTES
[facility] EMERGENCY DEPARTMENT  EMERGENCY DEPARTMENT ENCOUNTER       Pt Name: Selin Burns  MRN: [id number]  Birthdate 1990  Date of evaluation: 7/8/2025  Provider: Gita Thomas DO   PCP: No primary care provider on file.  Note Started: 3:43 PM 7/8/25     CHIEF COMPLAINT       Chief Complaint   Patient presents with    Headache        HISTORY OF PRESENT ILLNESS: 1 or more elements      History From: Patient  None     Selin Burns is a 34 y.o. female who presents with cc of headache x 1 and half weeks.  Patient reports headache is left-sided.  Reports lightheadedness, light sensitivity.  She has history of similar headaches that occur around the same time every year.  She does report a syncopal episode as well last week.  She denies any chest pain or shortness of breath associated. Reports she works outside a lot and feels she may hve been dehydrated causing her to pass out. No association of syncope with headache. She has tried multiple over-the-counter medications without improvement.  Patient reports history of migraines as well and states this headache feels similar to her previous migraines.  States that the medications she has taken improve her headache transiently but the headache returns.     Nursing Notes were all reviewed and agreed with or any disagreements were addressed in the HPI.       PAST HISTORY     Past Medical History:  Past Medical History:   Diagnosis Date    ADHD (attention deficit hyperactivity disorder)     Asthma     Headache     Substance induced mood disorder (HCC)          Past Surgical History:  Past Surgical History:   Procedure Laterality Date    APPENDECTOMY         Family History:  Family History   Problem Relation Age of Onset    ADHD Sister     Hypertension Sister     Asthma Sister     Hypertension Mother     Asthma Mother     Migraines Sister     Clotting Disorder Brother     Depression Brother     Hypertension Brother     Asthma Brother           DISCONTINUED MEDICATIONS:  Current Discharge Medication List          I am the Primary Clinician of Record.   Gita Thomas DO (electronically signed)      (Please note that parts of this dictation were completed with voice recognition software. Quite often unanticipated grammatical, syntax, homophones, and other interpretive errors are inadvertently transcribed by the computer software. Please disregards these errors. Please excuse any errors that have escaped final proofreading.)         Gita Thomas DO  07/12/25 0884

## 2025-07-08 NOTE — ED NOTES
Discharge instructions were given to the patient by Naya Paz RN  .     The patient left the Emergency Department ambulatory, alert and oriented and in no acute distress with 1 prescriptions. The patient was encouraged to call or return to the ED for worsening issues or problems and was encouraged to schedule a follow up appointment for continuing care. Patient discharged home ambulatory with a steady gait and designated .    The patient verbalized understanding of discharge instructions and prescriptions, all questions were answered. The patient has no further concerns at this time.

## 2025-07-08 NOTE — ED NOTES
Pt presents ambulatory to ED complaining of left sided headache with light sensitivity x1.5 weeks. Pt denies vision changes. Pt used OTC pain medication without relief. Pt does report hx of migraines. Pt is alert and oriented x 4, RR even and unlabored, skin is warm and dry. Assessment completed and pt updated on plan of care.        Emergency Department Nursing Plan of Care        The Nursing Plan of Care is developed from the Nursing assessment and Emergency Department Attending provider initial evaluation.  The plan of care may be reviewed in the “ED Provider note”.